# Patient Record
Sex: FEMALE | Race: WHITE | NOT HISPANIC OR LATINO | Employment: FULL TIME | URBAN - NONMETROPOLITAN AREA
[De-identification: names, ages, dates, MRNs, and addresses within clinical notes are randomized per-mention and may not be internally consistent; named-entity substitution may affect disease eponyms.]

---

## 2017-02-03 LAB
BASOPHILS # BLD AUTO: 0 X10E3/UL (ref 0–0.2)
BASOPHILS NFR BLD AUTO: 1 %
EOSINOPHIL # BLD AUTO: 0.1 X10E3/UL (ref 0–0.4)
EOSINOPHIL NFR BLD AUTO: 3 %
ERYTHROCYTE [DISTWIDTH] IN BLOOD BY AUTOMATED COUNT: 14.2 % (ref 12.3–15.4)
FERRITIN SERPL-MCNC: 18 NG/ML (ref 15–150)
HCT VFR BLD AUTO: 43.2 % (ref 34–46.6)
HGB BLD-MCNC: 14 G/DL (ref 11.1–15.9)
IMM GRANULOCYTES # BLD: 0 X10E3/UL (ref 0–0.1)
IMM GRANULOCYTES NFR BLD: 0 %
IMMATURE CELLS  115398: NORMAL
IRON SATN MFR SERPL: 21 % (ref 15–55)
IRON SERPL-MCNC: 65 UG/DL (ref 27–159)
LYMPHOCYTES # BLD AUTO: 1.8 X10E3/UL (ref 0.7–3.1)
LYMPHOCYTES NFR BLD AUTO: 41 %
MCH RBC QN AUTO: 29.2 PG (ref 26.6–33)
MCHC RBC AUTO-ENTMCNC: 32.4 G/DL (ref 31.5–35.7)
MCV RBC AUTO: 90 FL (ref 79–97)
MONOCYTES # BLD AUTO: 0.3 X10E3/UL (ref 0.1–0.9)
MONOCYTES NFR BLD AUTO: 7 %
MORPHOLOGY BLD-IMP: NORMAL
NEUTROPHILS # BLD AUTO: 2.1 X10E3/UL (ref 1.4–7)
NEUTROPHILS NFR BLD AUTO: 48 %
NRBC BLD AUTO-RTO: NORMAL %
PLATELET # BLD AUTO: 184 X10E3/UL (ref 150–379)
RBC # BLD AUTO: 4.79 X10E6/UL (ref 3.77–5.28)
TIBC SERPL-MCNC: 315 UG/DL (ref 250–450)
UIBC SERPL-MCNC: 250 UG/DL (ref 131–425)
WBC # BLD AUTO: 4.3 X10E3/UL (ref 3.4–10.8)

## 2017-03-08 ENCOUNTER — NON-PROVIDER VISIT (OUTPATIENT)
Dept: URGENT CARE | Facility: PHYSICIAN GROUP | Age: 47
End: 2017-03-08
Payer: COMMERCIAL

## 2017-03-08 ENCOUNTER — OFFICE VISIT (OUTPATIENT)
Dept: MEDICAL GROUP | Facility: PHYSICIAN GROUP | Age: 47
End: 2017-03-08
Payer: COMMERCIAL

## 2017-03-08 ENCOUNTER — APPOINTMENT (OUTPATIENT)
Dept: RADIOLOGY | Facility: IMAGING CENTER | Age: 47
End: 2017-03-08
Attending: INTERNAL MEDICINE
Payer: COMMERCIAL

## 2017-03-08 VITALS
TEMPERATURE: 97.5 F | DIASTOLIC BLOOD PRESSURE: 78 MMHG | SYSTOLIC BLOOD PRESSURE: 122 MMHG | HEIGHT: 64 IN | WEIGHT: 201 LBS | RESPIRATION RATE: 16 BRPM | OXYGEN SATURATION: 97 % | BODY MASS INDEX: 34.31 KG/M2 | HEART RATE: 76 BPM

## 2017-03-08 DIAGNOSIS — M54.2 NECK PAIN: ICD-10-CM

## 2017-03-08 DIAGNOSIS — M51.36 LUMBAR DEGENERATIVE DISC DISEASE: ICD-10-CM

## 2017-03-08 DIAGNOSIS — E66.09 NON MORBID OBESITY DUE TO EXCESS CALORIES: ICD-10-CM

## 2017-03-08 DIAGNOSIS — E63.9 NUTRITIONAL DEFICIENCY: ICD-10-CM

## 2017-03-08 DIAGNOSIS — K64.9 HEMORRHOIDS, UNSPECIFIED HEMORRHOID TYPE: ICD-10-CM

## 2017-03-08 DIAGNOSIS — D50.8 IRON DEFICIENCY ANEMIA SECONDARY TO INADEQUATE DIETARY IRON INTAKE: ICD-10-CM

## 2017-03-08 PROBLEM — M51.369 LUMBAR DEGENERATIVE DISC DISEASE: Status: ACTIVE | Noted: 2017-03-08

## 2017-03-08 PROCEDURE — 72100 X-RAY EXAM L-S SPINE 2/3 VWS: CPT | Mod: TC | Performed by: INTERNAL MEDICINE

## 2017-03-08 PROCEDURE — 72040 X-RAY EXAM NECK SPINE 2-3 VW: CPT | Mod: TC | Performed by: INTERNAL MEDICINE

## 2017-03-08 PROCEDURE — 99214 OFFICE O/P EST MOD 30 MIN: CPT | Performed by: INTERNAL MEDICINE

## 2017-03-08 RX ORDER — CYCLOBENZAPRINE HCL 10 MG
5-10 TABLET ORAL 3 TIMES DAILY PRN
Qty: 30 TAB | Refills: 0 | Status: SHIPPED | OUTPATIENT
Start: 2017-03-08 | End: 2017-09-20

## 2017-03-08 RX ORDER — HYDROCORTISONE ACETATE 25 MG/1
25 SUPPOSITORY RECTAL EVERY 12 HOURS
Qty: 30 SUPPOSITORY | Refills: 0 | Status: SHIPPED | OUTPATIENT
Start: 2017-03-08 | End: 2018-04-12

## 2017-03-08 NOTE — MR AVS SNAPSHOT
"        Jen Roblerodes   3/8/2017 7:40 AM   Office Visit   MRN: [id number]    Department:  [facility]   Dept Phone:  [phone]    Description:  Female : 1970   Provider:  Greta Ramos M.D.           Reason for Visit     Results fv labs, Rx for hemorrhoids    Pain neck and back pain,\"tearing\" in L side abd      Allergies as of 3/8/2017     Allergen Noted Reactions    Amoxicillin 2015       rashes    Penicillins 2015       rashes    Tetracycline 2015       rashes    Augmentin 10/12/2015   Rash    .      You were diagnosed with     Lumbar degenerative disc disease   [711309]       Neck pain   [030307]       Non morbid obesity due to excess calories   [8329089]       Nutritional deficiency   [790323]       Hemorrhoids, unspecified hemorrhoid type   [6964682]         Vital Signs     Blood Pressure Pulse Temperature Respirations Height Weight    122/78 mmHg 76 36.4 °C (97.5 °F) 16 1.626 m (5' 4.02\") 91.173 kg (201 lb)    Body Mass Index Oxygen Saturation Smoking Status             34.48 kg/m2 97% Never Smoker          Basic Information     Date Of Birth Sex Race Ethnicity Preferred Language    1970 Female White Non- English      Your appointments     Mar 22, 2017  8:30 AM   MA [id number] with [facility] MG 1   [facility] BREAST [facility] ([address])    [address] [place] [id number] [phone]           No deodorant, powder, perfume or lotion under the arm or breast area.            Jul 10, 2017  3:00 PM   Established Patient with Greta Ramos M.D.   [address] [address] [phone]           You will be receiving a confirmation call a few days before your appointment from our automated call confirmation system.              Problem List              ICD-10-CM Priority Class Noted - Resolved    Low back pain M54.5   2014 - Present    Obesity E66.9   2014 - Present   " Non morbid obesity due to excess calories E66.09   10/12/2015 - Present    S/P gastric bypass Z98.84   12/21/2015 - Present    Nutritional deficiency E63.9   12/21/2015 - Present    Status post hysterectomy Z90.710   6/23/2016 - Present    Slow transit constipation K59.01   6/23/2016 - Present    Lumbar degenerative disc disease M51.36   3/8/2017 - Present    Neck pain M54.2   3/8/2017 - Present      Health Maintenance        Date Due Completion Dates    IMM DTaP/Tdap/Td Vaccine (1 - Tdap) 8/22/1990 ---    PAP SMEAR 3/2/2016 3/2/2013 (Done)    Override on 3/2/2013: Done (sees GYN)    IMM INFLUENZA (1) 9/1/2016 ---    MAMMOGRAM 2/18/2017 2/18/2016, 2/9/2016, 2/9/2016, 2/9/2016            Current Immunizations     No immunizations on file.      Below and/or attached are the medications your provider expects you to take. Review all of your home medications and newly ordered medications with your provider and/or pharmacist. Follow medication instructions as directed by your provider and/or pharmacist. Please keep your medication list with you and share with your provider. Update the information when medications are discontinued, doses are changed, or new medications (including over-the-counter products) are added; and carry medication information at all times in the event of emergency situations     Allergies:  AMOXICILLIN - (reactions not documented)     PENICILLINS - (reactions not documented)     TETRACYCLINE - (reactions not documented)     AUGMENTIN - Rash               Medications  Valid as of: March 08, 2017 -  8:17 AM    Generic Name Brand Name Tablet Size Instructions for use    Calcium-Vitamin D-Vitamin K   Take 1 Tab by mouth every day.        Cephalexin (Cap) KEFLEX 500 MG Take 500 mg by mouth 4 times a day.        Clotrimazole-Betamethasone (Cream) LOTRISONE 1-0.05 % Apply  to affected area(s) 2 times a day.        Cyclobenzaprine HCl (Tab) FLEXERIL 10 MG Take 0.5-1 Tabs by mouth 3 times a day as needed for  Mild Pain or Moderate Pain.        Ergocalciferol (Cap) DRISDOL 92220 UNITS Take 50,000 Units by mouth every 7 days.        Ferrous Gluconate (Tab) Iron 240 (27 FE) MG Take 1 Tab by mouth every day.        Fluconazole   Take 150 mg by mouth.        Hydrocortisone Acetate (Suppos) ANUSOL-HC 25 MG Insert 25 mg in rectum as needed. Indications: Inflamed Hemorrhoids        Hydrocortisone Acetate (Suppos) ANUSOL-HC 25 MG Insert 1 Suppository in rectum every 12 hours.        MethylPREDNISolone (Tablet Therapy Pack) MEDROL DOSEPAK 4 MG Take 1 Tab by mouth See Admin Instructions.        Misc. Devices (Misc) Misc. Devices  Total body iron transfusion per pharmacy protocol; patient has iron deficiency anemia; fax to infusion center        Multiple Vitamins-Iron   Take 1 Tab by mouth every day.        Ondansetron (TABLET DISPERSIBLE) ZOFRAN ODT 4 MG         OxyCODONE HCl (Solution) ROXICODONE 5 MG/5ML         Polyethylene Glycol 3350 (Powder) MIRALAX  Take 17 g by mouth 2 times a day.        Probiotic Product (Cap) PROBIOTIC DAILY  Take 1 Cap by mouth every day.        .                 Medicines prescribed today were sent to:     [facility] [address] [place] [address] - [address] [facility] [address] [place]    [address] [facility] [address]    Phone: [phone] Fax: [phone]    Open 24 Hours?: No      Medication refill instructions:       If your prescription bottle indicates you have medication refills left, it is not necessary to call your provider’s office. Please contact your pharmacy and they will refill your medication.    If your prescription bottle indicates you do not have any refills left, you may request refills at any time through one of the following ways: The online [employer] system (except Urgent Care), by calling your provider’s office, or by asking your pharmacy to contact your provider’s office with a refill request. Medication refills are processed only during regular  business hours and may not be available until the next business day. Your provider may request additional information or to have a follow-up visit with you prior to refilling your medication.   *Please Note: Medication refills are assigned a new Rx number when refilled electronically. Your pharmacy may indicate that no refills were authorized even though a new prescription for the same medication is available at the pharmacy. Please request the medicine by name with the pharmacy before contacting your provider for a refill.        Your To Do List     Future Labs/Procedures Complete By Expires    DX-CERVICAL SPINE-2 OR 3 VIEWS  As directed 3/8/2018    DX-LUMBAR SPINE-2 OR 3 VIEWS  As directed 3/8/2018    Standing Orders Interval Expires    CBC WITH DIFFERENTIAL  q 3 month until 3/8/2018 3/8/2018    FERRITIN  q 3 month until 3/8/2018 3/8/2018    IRON/TOTAL IRON BIND  q 3 month until 3/8/2018 3/8/2018      Instructions    1. Have xrays of your neck.    2. Have labs checked every 3-4 months for anemia.    3. Follow up in 3-4 months.          [employer] Access Access Code: Activation code not generated  Current Aqua Access Status: Active

## 2017-03-08 NOTE — ASSESSMENT & PLAN NOTE
Patient does continue to have weight loss after her gastric bypass surgery. We discussed continuing to work on diet and exercise changes.

## 2017-03-08 NOTE — PROGRESS NOTES
"Chief Complaint   Patient presents with   • Results     fv labs, Rx for hemorrhoids   • Pain     neck and back pain,\"tearing\" in L side abd       HISTORY OF PRESENT ILLNESS: Patient is a 46 y.o. female established patient who presents today to be seen for acute and chronic issues.    Lumbar degenerative disc disease  The patient has a long history of episodic low back pain. X-rays done in 2014 which showed lumbar degenerative disc disease. Recently with exercise she has noted more back symptoms. She has had extensive weight loss after gastric bypass surgery in 2015. We discussed a reexamining her back with another x-ray and doing stretching exercises. I did offer referral to physiatry but she declines.    Neck pain  Patient also notes episodic neck pain and tightness that can lead to headaches about once a week. Symptoms seem to worsen with physical activity. We discussed that this is probably a similar process to what is in her lower back. I recommended doing an x-ray. Patient also was offered Flexeril as a muscle relaxer and told to be cautious with this medication which can be sedating.    Iron deficiency anemia secondary to inadequate dietary iron intake  Patient has long history of iron deficiency anemia that she required transfusions past. She has a history of gastric bypass surgery in 2015 and also extremely heavy menstrual periods which were the source of most of her anemia in the past. She did have a hysterectomy. This all we demonstrated that she had low iron again and the patient was transfused iron. She may require intermittent transfusions and I'm giving her standing lab orders for CBC, ferritin and iron studies to be checked every 3-4 months. Her recent stores are stable although ferritin did decrease in the past 5 months..    Obesity  Patient does continue to have weight loss after her gastric bypass surgery. We discussed continuing to work on diet and exercise changes.      Patient Active Problem " List    Diagnosis Date Noted   • Lumbar degenerative disc disease 03/08/2017   • Neck pain 03/08/2017   • Iron deficiency anemia secondary to inadequate dietary iron intake 03/08/2017   • Status post hysterectomy 06/23/2016   • Slow transit constipation 06/23/2016   • S/P gastric bypass 12/21/2015   • Nutritional deficiency 12/21/2015   • Non morbid obesity due to excess calories 10/12/2015   • Low back pain 07/02/2014   • Obesity 07/02/2014       Allergies:Amoxicillin; Penicillins; Tetracycline; and Augmentin    Current Outpatient Prescriptions Ordered in [facility]   Medication Sig Dispense Refill   • cyclobenzaprine (FLEXERIL) 10 MG Tab Take 0.5-1 Tabs by mouth 3 times a day as needed for Mild Pain or Moderate Pain. 30 Tab 0   • hydrocortisone (ANUSOL-HC) 25 MG Suppos Insert 1 Suppository in rectum every 12 hours. 30 Suppository 0   • clotrimazole-betamethasone (LOTRISONE) 1-0.05 % Cream Apply  to affected area(s) 2 times a day.     • FLUCONAZOLE PO Take 150 mg by mouth.     • polyethylene glycol 3350 (MIRALAX) Powder Take 17 g by mouth 2 times a day. 1 Bottle 3   • hydrocortisone (ANUSOL-HC) 25 MG Suppos Insert 25 mg in rectum as needed. Indications: Inflamed Hemorrhoids     • MethylPREDNISolone (MEDROL DOSEPAK) 4 MG Tablet Therapy Pack Take 1 Tab by mouth See Admin Instructions. 21 Tab 0   • Misc. Devices Misc Total body iron transfusion per pharmacy protocol; patient has iron deficiency anemia; fax to infusion center 1 Device 0   • cephALEXin (KEFLEX) 500 MG Cap Take 500 mg by mouth 4 times a day.     • oxycodone (ROXICODONE) 5 MG/5ML solution      • ondansetron (ZOFRAN ODT) 4 MG TABLET DISPERSIBLE   0   • Multiple Vitamins-Iron (MULTIVITAMIN/IRON PO) Take 1 Tab by mouth every day.     • Ferrous Gluconate (IRON) 240 (27 FE) MG Tab Take 1 Tab by mouth every day.     • Calcium-Vitamin D-Vitamin K (CALCIUM SOFT CHEWS PO) Take 1 Tab by mouth every day.     • Probiotic Product (PROBIOTIC DAILY) Cap Take 1 Cap by mouth  "every day.     • vitamin D, Ergocalciferol, (DRISDOL) 73084 UNITS Cap capsule Take 50,000 Units by mouth every 7 days.       No current [facility]-ordered facility-administered medications on file.       Past Medical History   Diagnosis Date   • Obesity    • Anesthesia      nausea   • Arthritis      deg disc disease lower back   • Gynecological disorder      fibroids, irreg periods   • Anemia    • Bowel habit changes      constipation   • Hemorrhoids        Social History   Substance Use Topics   • Smoking status: Never Smoker    • Smokeless tobacco: Never Used   • Alcohol Use: No       Family Status   Relation Status Death Age   • Mother Alive    • Father Alive      Family History   Problem Relation Age of Onset   • Diabetes Father        ROS:  Review of Systems   Constitutional: Negative for fever and malaise/fatigue.   HENT: Negative for congestion  Respiratory: Negative for cough  Cardiovascular: Negative for chest pain  Gastrointestinal: Negative for nausea, vomiting and abdominal pain.  Musculoskeletal: Positive for back pain and neck pain  All other systems reviewed and are negative except as in HPI.      Exam:  Blood pressure 122/78, pulse 76, temperature 36.4 °C (97.5 °F), resp. rate 16, height 1.626 m (5' 4.02\"), weight 91.173 kg (201 lb), SpO2 97 %.  General: Obese female in NAD  Head is grossly normal.  Neck: Intact for range of motion but muscle tension noted on exam  Pulmonary: Clear to ausculation and percussion.  Normal effort. No rales, ronchi, or wheezing.  Cardiovascular: Regular rate and rhythm without murmur. Carotid and radial pulses are intact and equal bilaterally.  Extremities: no clubbing, cyanosis, or edema.    No visits with results within 1 Month(s) from this visit.  Latest known visit with results is:    Orders Only on 02/02/2017   Component Date Value Ref Range Status   • WBC 02/02/2017 4.3  3.4 - 10.8 x10E3/uL Final   • RBC 02/02/2017 4.79  3.77 - 5.28 x10E6/uL Final   • Hemoglobin " 02/02/2017 14.0  11.1 - 15.9 g/dL Final   • Hematocrit 02/02/2017 43.2  34.0 - 46.6 % Final   • MCV 02/02/2017 90  79 - 97 fL Final   • MCH 02/02/2017 29.2  26.6 - 33.0 pg Final   • MCHC 02/02/2017 32.4  31.5 - 35.7 g/dL Final   • RDW 02/02/2017 14.2  12.3 - 15.4 % Final   • Platelet Count 02/02/2017 184  150 - 379 x10E3/uL Final   • Neutrophils-Polys 02/02/2017 48   Final   • Lymphocytes 02/02/2017 41   Final   • Monocytes 02/02/2017 7   Final   • Eosinophils 02/02/2017 3   Final   • Basophils 02/02/2017 1   Final   • Immature Cells 02/02/2017 CANCELED   Final    Result canceled by the ancillary   • Neutrophils (Absolute) 02/02/2017 2.1  1.4 - 7.0 x10E3/uL Final   • Lymphs (Absolute) 02/02/2017 1.8  0.7 - 3.1 x10E3/uL Final   • Monos (Absolute) 02/02/2017 0.3  0.1 - 0.9 x10E3/uL Final   • Eos (Absolute) 02/02/2017 0.1  0.0 - 0.4 x10E3/uL Final   • Baso (Absolute) 02/02/2017 0.0  0.0 - 0.2 x10E3/uL Final   • Immature Granulocytes 02/02/2017 0   Final   • Immature Granulocytes (abs) 02/02/2017 0.0  0.0 - 0.1 x10E3/uL Final   • Nucleated RBC 02/02/2017 CANCELED   Final    Result canceled by the ancillary   • Comments-Diff 02/02/2017 CANCELED   Final    Result canceled by the ancillary   • Total Iron Binding 02/02/2017 315  250 - 450 ug/dL Final   • Unsat Iron Binding 02/02/2017 250  131 - 425 ug/dL Final   • Iron 02/02/2017 65  27 - 159 ug/dL Final   • % Saturation 02/02/2017 21  15 - 55 % Final   • Ferritin 02/02/2017 18  15 - 150 ng/mL Final    Comment: A courtesy copy of this report has been sent to  the patient.           Assessment/Plan:  1. Lumbar degenerative disc disease  DX-LUMBAR SPINE-2 OR 3 VIEWS    cyclobenzaprine (FLEXERIL) 10 MG Tab    Uncontrolled, patient will do stretching exercises   2. Neck pain  DX-CERVICAL SPINE-2 OR 3 VIEWS    cyclobenzaprine (FLEXERIL) 10 MG Tab    Uncontrolled, recommended x-ray   3. Non morbid obesity due to excess calories  Patient identified as having weight management  issue.  Appropriate orders and counseling given.    Control, losing weight after gastric bypass   4. Nutritional deficiency  CBC WITH DIFFERENTIAL    FERRITIN    IRON/TOTAL IRON BIND    Uncontrolled, would recommend monitoring anemia   5. Hemorrhoids, unspecified hemorrhoid type  hydrocortisone (ANUSOL-HC) 25 MG Suppos   6. Iron deficiency anemia secondary to inadequate dietary iron intake       Please note that this dictation was created using voice recognition software. I have made every reasonable attempt to correct obvious errors, but I expect that there are errors of grammar and possibly content that I did not discover before finalizing the note.

## 2017-03-08 NOTE — ASSESSMENT & PLAN NOTE
Patient also notes episodic neck pain and tightness that can lead to headaches about once a week. Symptoms seem to worsen with physical activity. We discussed that this is probably a similar process to what is in her lower back. I recommended doing an x-ray. Patient also was offered Flexeril as a muscle relaxer and told to be cautious with this medication which can be sedating.

## 2017-03-08 NOTE — PATIENT INSTRUCTIONS
1. Have xrays of your neck.    2. Have labs checked every 3-4 months for anemia.    3. Follow up in 3-4 months.

## 2017-03-08 NOTE — ASSESSMENT & PLAN NOTE
Patient has long history of iron deficiency anemia that she required transfusions past. She has a history of gastric bypass surgery in 2015 and also extremely heavy menstrual periods which were the source of most of her anemia in the past. She did have a hysterectomy. This all we demonstrated that she had low iron again and the patient was transfused iron. She may require intermittent transfusions and I'm giving her standing lab orders for CBC, ferritin and iron studies to be checked every 3-4 months. Her recent stores are stable although ferritin did decrease in the past 5 months..

## 2017-03-08 NOTE — ASSESSMENT & PLAN NOTE
The patient has a long history of episodic low back pain. X-rays done in 2014 which showed lumbar degenerative disc disease. Recently with exercise she has noted more back symptoms. She has had extensive weight loss after gastric bypass surgery in 2015. We discussed a reexamining her back with another x-ray and doing stretching exercises. I did offer referral to physiatry but she declines.

## 2017-03-09 ENCOUNTER — TELEPHONE (OUTPATIENT)
Dept: MEDICAL GROUP | Facility: PHYSICIAN GROUP | Age: 47
End: 2017-03-09

## 2017-03-14 ENCOUNTER — TELEPHONE (OUTPATIENT)
Dept: MEDICAL GROUP | Facility: PHYSICIAN GROUP | Age: 47
End: 2017-03-14

## 2017-03-14 NOTE — TELEPHONE ENCOUNTER
We received a PAR for Anusol-HC. PAR was completed and sent back to us from Insurance with notification that med does not need PA. WE contacted the pharmacy and they stated that they are receiving a denial from the pharmacy stating NON FDA approved and NDC not on file. Please advise.

## 2017-03-14 NOTE — TELEPHONE ENCOUNTER
At this point the patient can go back to [facility] but her insurance will not cover it. She can try hydrocortisone cream 2.5% OTC and just insert it.

## 2017-03-22 ENCOUNTER — HOSPITAL ENCOUNTER (OUTPATIENT)
Dept: RADIOLOGY | Facility: MEDICAL CENTER | Age: 47
End: 2017-03-22
Attending: INTERNAL MEDICINE
Payer: COMMERCIAL

## 2017-03-22 DIAGNOSIS — Z12.31 VISIT FOR SCREENING MAMMOGRAM: ICD-10-CM

## 2017-03-22 PROCEDURE — G0202 SCR MAMMO BI INCL CAD: HCPCS

## 2017-03-29 ENCOUNTER — TELEPHONE (OUTPATIENT)
Dept: MEDICAL GROUP | Facility: PHYSICIAN GROUP | Age: 47
End: 2017-03-29

## 2017-03-29 NOTE — Clinical Note
April 4, 2017        Jen Mejía  [address]  [place] [address]        Dear Jen:    I have reviewed your mammogram results, they consist of the following:     TECHNIQUE/EXAM DESCRIPTION:  Bilateral digital screening mammography was performed and interpreted with CAD.     COMPARISON:   February 18, 2016, August 21, 2015 and November 13, 2014     FINDINGS:     There are scattered areas of fibroglandular density.  There is no dominant mass, suspicious calcification, or any secondary malignant sign.  Biopsy clip identified medially in the left breast. Bilateral benign-appearing calcifications are once again    noted.  Impression        1.  Breasts have scattered areas of fibroglandular density, with no radiographic evidence of malignancy.     2.  Screening mammogram in one year is recommended.        R2 - Category 2:  Benign Finding(s)       If you have any questions or concerns, please don't hesitate to call.        Sincerely,        DAVINA Matthew.    Electronically Signed

## 2017-03-29 NOTE — TELEPHONE ENCOUNTER
----- Message from Your Healthcare Team sent at 3/29/2017  5:00 AM PDT -----  Regarding: mammogram results  Jen,  I have reviewed your mammogram results, they consist of the following:    TECHNIQUE/EXAM DESCRIPTION:  Bilateral digital screening mammography was performed and interpreted with CAD.    COMPARISON:   February 18, 2016, August 21, 2015 and November 13, 2014    FINDINGS:    There are scattered areas of fibroglandular density.  There is no dominant mass, suspicious calcification, or any secondary malignant sign.  Biopsy clip identified medially in the left breast. Bilateral benign-appearing calcifications are once again   noted.  Impression   1.  Breasts have scattered areas of fibroglandular density, with no radiographic evidence of malignancy.    2.  Screening mammogram in one year is recommended.      R2 - Category 2:  Benign Finding(s)    If you have any further questions or concerns, please do not hesitate to call or send a message.    Flori Streeter, FNP–C

## 2017-03-29 NOTE — TELEPHONE ENCOUNTER
I sent the below information via my chart message a week ago--she has not read message as of yet. Please call her, or send letter with this information. Thank you.

## 2017-07-17 ENCOUNTER — OFFICE VISIT (OUTPATIENT)
Dept: URGENT CARE | Facility: PHYSICIAN GROUP | Age: 47
End: 2017-07-17
Payer: COMMERCIAL

## 2017-07-17 VITALS
HEIGHT: 64 IN | DIASTOLIC BLOOD PRESSURE: 80 MMHG | WEIGHT: 209 LBS | OXYGEN SATURATION: 92 % | RESPIRATION RATE: 18 BRPM | SYSTOLIC BLOOD PRESSURE: 130 MMHG | BODY MASS INDEX: 35.68 KG/M2 | HEART RATE: 86 BPM | TEMPERATURE: 98.4 F

## 2017-07-17 DIAGNOSIS — R51.9 NONINTRACTABLE HEADACHE, UNSPECIFIED CHRONICITY PATTERN, UNSPECIFIED HEADACHE TYPE: ICD-10-CM

## 2017-07-17 PROCEDURE — 99203 OFFICE O/P NEW LOW 30 MIN: CPT | Performed by: PHYSICIAN ASSISTANT

## 2017-07-17 RX ORDER — BUTALBITAL, ACETAMINOPHEN, CAFFEINE AND CODEINE PHOSPHATE 50; 325; 40; 30 MG/1; MG/1; MG/1; MG/1
1 CAPSULE ORAL EVERY 12 HOURS PRN
Qty: 15 CAP | Refills: 0 | Status: SHIPPED | OUTPATIENT
Start: 2017-07-17 | End: 2017-09-20

## 2017-07-17 RX ORDER — KETOROLAC TROMETHAMINE 30 MG/ML
30 INJECTION, SOLUTION INTRAMUSCULAR; INTRAVENOUS ONCE
Qty: 1 ML | Refills: 0 | OUTPATIENT
Start: 2017-07-17 | End: 2017-07-17

## 2017-07-17 NOTE — PROGRESS NOTES
Chief Complaint   Patient presents with   • Sore Throat     Headache, otalgia       HISTORY OF PRESENT ILLNESS: Patient is a 46 y.o. female who presents today for evaluation of a sore throat, ear pain, and headache for the last few days. Patient has had a sore throat and ear pain for several days. She thought it might be related to some ulcerations that she had on the left side of her mouth. She still has one ulceration on the roof of her mouth but the rest have resolved. She denies any difficulty hearing and drainage from her ears. She denies fever, chills, and chills. She complains of a headache it's by her temples, across her forehead, and at the back of her head. She has a history of migraines and states that it is in a similar location but is hurting worse than normal. She denies blurry vision and vomiting but does report some nausea. Over-the-counter medication has not been helping. She does not have migraines very often and has not had one in a few months.    Patient Active Problem List    Diagnosis Date Noted   • Lumbar degenerative disc disease 03/08/2017   • Neck pain 03/08/2017   • Iron deficiency anemia secondary to inadequate dietary iron intake 03/08/2017   • Status post hysterectomy 06/23/2016   • Slow transit constipation 06/23/2016   • S/P gastric bypass 12/21/2015   • Nutritional deficiency 12/21/2015   • Non morbid obesity due to excess calories 10/12/2015   • Low back pain 07/02/2014   • Obesity 07/02/2014       Allergies:Amoxicillin; Penicillins; Tetracycline; and Augmentin    Current Outpatient Prescriptions Ordered in [facility]   Medication Sig Dispense Refill   • butalbital-acetaminophen-caffeine-codeine (FIORICET W/CODEINE) -44-30 MG per capsule Take 1 Cap by mouth every 12 hours as needed for Headache. 15 Cap 0   • ketorolac (TORADOL) 60 MG/2ML Solution 1 mL by Intramuscular route Once for 1 dose. 1 mL 0   • cyclobenzaprine (FLEXERIL) 10 MG Tab Take 0.5-1 Tabs by mouth 3 times a day as  needed for Mild Pain or Moderate Pain. 30 Tab 0   • hydrocortisone (ANUSOL-HC) 25 MG Suppos Insert 1 Suppository in rectum every 12 hours. 30 Suppository 0   • MethylPREDNISolone (MEDROL DOSEPAK) 4 MG Tablet Therapy Pack Take 1 Tab by mouth See Admin Instructions. 21 Tab 0   • Misc. Devices Misc Total body iron transfusion per pharmacy protocol; patient has iron deficiency anemia; fax to infusion center 1 Device 0   • cephALEXin (KEFLEX) 500 MG Cap Take 500 mg by mouth 4 times a day.     • clotrimazole-betamethasone (LOTRISONE) 1-0.05 % Cream Apply  to affected area(s) 2 times a day.     • oxycodone (ROXICODONE) 5 MG/5ML solution      • ondansetron (ZOFRAN ODT) 4 MG TABLET DISPERSIBLE   0   • FLUCONAZOLE PO Take 150 mg by mouth.     • polyethylene glycol 3350 (MIRALAX) Powder Take 17 g by mouth 2 times a day. 1 Bottle 3   • Multiple Vitamins-Iron (MULTIVITAMIN/IRON PO) Take 1 Tab by mouth every day.     • Ferrous Gluconate (IRON) 240 (27 FE) MG Tab Take 1 Tab by mouth every day.     • Calcium-Vitamin D-Vitamin K (CALCIUM SOFT CHEWS PO) Take 1 Tab by mouth every day.     • Probiotic Product (PROBIOTIC DAILY) Cap Take 1 Cap by mouth every day.     • hydrocortisone (ANUSOL-HC) 25 MG Suppos Insert 25 mg in rectum as needed. Indications: Inflamed Hemorrhoids     • vitamin D, Ergocalciferol, (DRISDOL) 25017 UNITS Cap capsule Take 50,000 Units by mouth every 7 days.       No current [facility]-ordered facility-administered medications on file.       Past Medical History   Diagnosis Date   • Obesity    • Anesthesia      nausea   • Arthritis      deg disc disease lower back   • Gynecological disorder      fibroids, irreg periods   • Anemia    • Bowel habit changes      constipation   • Hemorrhoids        Social History   Substance Use Topics   • Smoking status: Never Smoker    • Smokeless tobacco: Never Used   • Alcohol Use: No       Family Status   Relation Status Death Age   • Mother Alive    • Father Alive      Family History  "  Problem Relation Age of Onset   • Diabetes Father        ROS:   Review of Systems   Constitutional: Negative for fever, chills, weight loss and malaise/fatigue.   HENT: Negative for nosebleeds, congestion, sore throat and neck pain.    Eyes: Negative for blurred vision.   Respiratory: Negative for cough, sputum production, shortness of breath and wheezing.    Cardiovascular: Negative for chest pain, palpitations, orthopnea and leg swelling.   Gastrointestinal: Negative for heartburn, nausea, vomiting and abdominal pain.   Genitourinary: Negative for dysuria, urgency and frequency.       Exam:  Blood pressure 130/80, pulse 86, temperature 36.9 °C (98.4 °F), resp. rate 18, height 1.626 m (5' 4\"), weight 94.802 kg (209 lb), SpO2 92 %.  General: Normal appearing. No distress.  HEENT: Conjunctiva clear, lids without ptosis, ears normal shape and contour, canals are clear bilaterally, tympanic membranes are benign with fluid posteriorly bilaterally, nasal mucosa edematous bilaterally, oropharynx is without erythema, edema or exudates. PERRL, EOMI. 1 small ulceration noted on the left side of the hard palate without surrounding edema/drainage.  Pulmonary: Clear to ausculation and percussion.  Normal effort. No rales, ronchi, or wheezing.   Cardiovascular: Regular rate and rhythm without murmur.   Neurologic: Grossly nonfocal.  Lymph: No cervical lymphadenopathy noted.  Skin: No obvious lesions noted in the area of pain.  Psych: Normal mood. Alert and oriented x3. Judgment and insight is normal.    Toradol 60 mg IM    Assessment/Plan:  DDX including but not limited to allergies/migraine/viral illness. Discussed appropriate over-the-counter symptomatic medication, and when to return to clinic. Take all medication as directed. Follow up for worsening or persistent symptoms.  1. Nonintractable headache, unspecified chronicity pattern, unspecified headache type  butalbital-acetaminophen-caffeine-codeine (FIORICET W/CODEINE) " -35-30 MG per capsule    ketorolac (TORADOL) 60 MG/2ML Solution

## 2017-07-17 NOTE — MR AVS SNAPSHOT
"Mohsenneli Lincolngundes   2017 2:25 PM   Office Visit   MRN: [id number]    Department:  [facility]   Dept Phone:  [phone]    Description:  Female : 1970   Provider:  Carol Roach PA-C           Reason for Visit     Sore Throat Headache, otalgia      Allergies as of 2017     Allergen Noted Reactions    Amoxicillin 2015       rashes    Penicillins 2015       rashes    Tetracycline 2015       rashes    Augmentin 10/12/2015   Rash    .      You were diagnosed with     Nonintractable headache, unspecified chronicity pattern, unspecified headache type   [[id number]]         Vital Signs     Blood Pressure Pulse Temperature Respirations Height Weight    130/80 mmHg 86 36.9 °C (98.4 °F) 18 1.626 m (5' 4\") 94.802 kg (209 lb)    Body Mass Index Oxygen Saturation Smoking Status             35.86 kg/m2 92% Never Smoker          Basic Information     Date Of Birth Sex Race Ethnicity Preferred Language    1970 Female White Non- English      Your appointments     Sep 20, 2017  8:00 AM   NEW TO YOU with Lemuel Wolfe M.D.   [facility]    [address] [place] [id number] [phone]              Problem List              ICD-10-CM Priority Class Noted - Resolved    Low back pain M54.5   2014 - Present    Obesity E66.9   2014 - Present    Non morbid obesity due to excess calories E66.09   10/12/2015 - Present    S/P gastric bypass Z98.84   2015 - Present    Nutritional deficiency E63.9   2015 - Present    Status post hysterectomy Z90.710   2016 - Present    Slow transit constipation K59.01   2016 - Present    Lumbar degenerative disc disease M51.36   3/8/2017 - Present    Neck pain M54.2   3/8/2017 - Present    Iron deficiency anemia secondary to inadequate dietary iron intake D50.8   3/8/2017 - Present      Health Maintenance        Date Due Completion Dates    IMM DTaP/Tdap/Td Vaccine (1 - " Tdap) 8/22/1990 ---    PAP SMEAR 3/2/2016 3/2/2013 (Done)    Override on 3/2/2013: Done (sees GYN)    IMM INFLUENZA (1) 9/1/2017 ---    MAMMOGRAM 3/22/2018 3/22/2017, 2/18/2016            Current Immunizations     No immunizations on file.      Below and/or attached are the medications your provider expects you to take. Review all of your home medications and newly ordered medications with your provider and/or pharmacist. Follow medication instructions as directed by your provider and/or pharmacist. Please keep your medication list with you and share with your provider. Update the information when medications are discontinued, doses are changed, or new medications (including over-the-counter products) are added; and carry medication information at all times in the event of emergency situations     Allergies:  AMOXICILLIN - (reactions not documented)     PENICILLINS - (reactions not documented)     TETRACYCLINE - (reactions not documented)     AUGMENTIN - Rash               Medications  Valid as of: July 17, 2017 -  3:55 PM    Generic Name Brand Name Tablet Size Instructions for use    Butalbital-APAP-Caff-Cod (Cap) FIORICET W/CODEINE -75-30 MG Take 1 Cap by mouth every 12 hours as needed for Headache.        Calcium-Vitamin D-Vitamin K   Take 1 Tab by mouth every day.        Cephalexin (Cap) KEFLEX 500 MG Take 500 mg by mouth 4 times a day.        Clotrimazole-Betamethasone (Cream) LOTRISONE 1-0.05 % Apply  to affected area(s) 2 times a day.        Cyclobenzaprine HCl (Tab) FLEXERIL 10 MG Take 0.5-1 Tabs by mouth 3 times a day as needed for Mild Pain or Moderate Pain.        Ergocalciferol (Cap) DRISDOL 09213 UNITS Take 50,000 Units by mouth every 7 days.        Ferrous Gluconate (Tab) Iron 240 (27 FE) MG Take 1 Tab by mouth every day.        Fluconazole   Take 150 mg by mouth.        Hydrocortisone Acetate (Suppos) ANUSOL-HC 25 MG Insert 25 mg in rectum as needed. Indications: Inflamed Hemorrhoids         Hydrocortisone Acetate (Suppos) ANUSOL-HC 25 MG Insert 1 Suppository in rectum every 12 hours.        Ketorolac Tromethamine (Solution) TORADOL 60 MG/2ML 1 mL by Intramuscular route Once for 1 dose.        MethylPREDNISolone (Tablet Therapy Pack) MEDROL DOSEPAK 4 MG Take 1 Tab by mouth See Admin Instructions.        Misc. Devices (Misc) Misc. Devices  Total body iron transfusion per pharmacy protocol; patient has iron deficiency anemia; fax to infusion center        Multiple Vitamins-Iron   Take 1 Tab by mouth every day.        Ondansetron (TABLET DISPERSIBLE) ZOFRAN ODT 4 MG         OxyCODONE HCl (Solution) ROXICODONE 5 MG/5ML         Polyethylene Glycol 3350 (Powder) MIRALAX  Take 17 g by mouth 2 times a day.        Probiotic Product (Cap) PROBIOTIC DAILY  Take 1 Cap by mouth every day.        .                 Medicines prescribed today were sent to:     "[facility]" [facility] [address] [place], [place] - [address] [facility] [address]    [address] [facility] [address]    Phone: [phone] Fax: [phone]    Open 24 Hours?: No      Medication refill instructions:       If your prescription bottle indicates you have medication refills left, it is not necessary to call your provider’s office. Please contact your pharmacy and they will refill your medication.    If your prescription bottle indicates you do not have any refills left, you may request refills at any time through one of the following ways: The online pickrset system (except Urgent Care), by calling your provider’s office, or by asking your pharmacy to contact your provider’s office with a refill request. Medication refills are processed only during regular business hours and may not be available until the next business day. Your provider may request additional information or to have a follow-up visit with you prior to refilling your medication.   *Please Note: Medication refills are assigned a new Rx number when refilled  electronically. Your pharmacy may indicate that no refills were authorized even though a new prescription for the same medication is available at the pharmacy. Please request the medicine by name with the pharmacy before contacting your provider for a refill.           [employer] Access Code: Activation code not generated  Current [employer] Status: Active

## 2017-07-27 LAB
BASOPHILS # BLD AUTO: 0 X10E3/UL (ref 0–0.2)
BASOPHILS NFR BLD AUTO: 0 %
EOSINOPHIL # BLD AUTO: 0.1 X10E3/UL (ref 0–0.4)
EOSINOPHIL NFR BLD AUTO: 2 %
ERYTHROCYTE [DISTWIDTH] IN BLOOD BY AUTOMATED COUNT: 14.1 % (ref 12.3–15.4)
FERRITIN SERPL-MCNC: 15 NG/ML (ref 15–150)
HCT VFR BLD AUTO: 44.2 % (ref 34–46.6)
HGB BLD-MCNC: 14.6 G/DL (ref 11.1–15.9)
IMM GRANULOCYTES # BLD: 0 X10E3/UL (ref 0–0.1)
IMM GRANULOCYTES NFR BLD: 0 %
IMMATURE CELLS  115398: NORMAL
IRON SATN MFR SERPL: 24 % (ref 15–55)
IRON SERPL-MCNC: 78 UG/DL (ref 27–159)
LYMPHOCYTES # BLD AUTO: 1.9 X10E3/UL (ref 0.7–3.1)
LYMPHOCYTES NFR BLD AUTO: 37 %
MCH RBC QN AUTO: 29.3 PG (ref 26.6–33)
MCHC RBC AUTO-ENTMCNC: 33 G/DL (ref 31.5–35.7)
MCV RBC AUTO: 89 FL (ref 79–97)
MONOCYTES # BLD AUTO: 0.3 X10E3/UL (ref 0.1–0.9)
MONOCYTES NFR BLD AUTO: 7 %
MORPHOLOGY BLD-IMP: NORMAL
NEUTROPHILS # BLD AUTO: 2.7 X10E3/UL (ref 1.4–7)
NEUTROPHILS NFR BLD AUTO: 54 %
NRBC BLD AUTO-RTO: NORMAL %
PLATELET # BLD AUTO: 167 X10E3/UL (ref 150–379)
RBC # BLD AUTO: 4.99 X10E6/UL (ref 3.77–5.28)
TIBC SERPL-MCNC: 331 UG/DL (ref 250–450)
UIBC SERPL-MCNC: 253 UG/DL (ref 131–425)
WBC # BLD AUTO: 5 X10E3/UL (ref 3.4–10.8)

## 2017-09-20 ENCOUNTER — OCCUPATIONAL MEDICINE (OUTPATIENT)
Dept: URGENT CARE | Facility: PHYSICIAN GROUP | Age: 47
End: 2017-09-20
Payer: COMMERCIAL

## 2017-09-20 ENCOUNTER — APPOINTMENT (OUTPATIENT)
Dept: RADIOLOGY | Facility: IMAGING CENTER | Age: 47
End: 2017-09-20
Attending: FAMILY MEDICINE
Payer: COMMERCIAL

## 2017-09-20 ENCOUNTER — OFFICE VISIT (OUTPATIENT)
Dept: MEDICAL GROUP | Facility: PHYSICIAN GROUP | Age: 47
End: 2017-09-20
Payer: COMMERCIAL

## 2017-09-20 VITALS
DIASTOLIC BLOOD PRESSURE: 70 MMHG | TEMPERATURE: 98 F | WEIGHT: 212 LBS | RESPIRATION RATE: 14 BRPM | HEART RATE: 68 BPM | SYSTOLIC BLOOD PRESSURE: 118 MMHG | HEIGHT: 65 IN | OXYGEN SATURATION: 100 % | BODY MASS INDEX: 35.32 KG/M2

## 2017-09-20 VITALS
WEIGHT: 212 LBS | RESPIRATION RATE: 16 BRPM | HEART RATE: 74 BPM | OXYGEN SATURATION: 98 % | DIASTOLIC BLOOD PRESSURE: 72 MMHG | TEMPERATURE: 98.2 F | BODY MASS INDEX: 35.32 KG/M2 | SYSTOLIC BLOOD PRESSURE: 126 MMHG | HEIGHT: 65 IN

## 2017-09-20 DIAGNOSIS — S49.91XA RIGHT SHOULDER INJURY, INITIAL ENCOUNTER: ICD-10-CM

## 2017-09-20 DIAGNOSIS — G43.009 MIGRAINE WITHOUT AURA AND WITHOUT STATUS MIGRAINOSUS, NOT INTRACTABLE: ICD-10-CM

## 2017-09-20 DIAGNOSIS — Z98.84 S/P GASTRIC BYPASS: ICD-10-CM

## 2017-09-20 DIAGNOSIS — D50.8 IRON DEFICIENCY ANEMIA SECONDARY TO INADEQUATE DIETARY IRON INTAKE: ICD-10-CM

## 2017-09-20 DIAGNOSIS — S40.011A CONTUSION OF RIGHT SHOULDER, INITIAL ENCOUNTER: ICD-10-CM

## 2017-09-20 PROCEDURE — 73030 X-RAY EXAM OF SHOULDER: CPT | Mod: TC,RT | Performed by: FAMILY MEDICINE

## 2017-09-20 PROCEDURE — 99214 OFFICE O/P EST MOD 30 MIN: CPT | Performed by: FAMILY MEDICINE

## 2017-09-20 ASSESSMENT — PAIN SCALES - GENERAL: PAINLEVEL: 8=MODERATE-SEVERE PAIN

## 2017-09-20 NOTE — PROGRESS NOTES
Subjective:   Jen Mejía is a 47 y.o. female here today for evaluation and management of:     Iron deficiency anemia secondary to inadequate dietary iron intake  She has chronic iron deficiency anemia. She had a hysterectomy done for heavy periods due to fibroids, she had an iron infusion about a year ago and recent labs show normal H/H and iron though low normal ferritin. She has no history of blood transfusions.   Will monitor with labs every 3-4 months.       S/P gastric bypass  She has gastric bypass surgery done in 2015 by Dr. Ganser   She is taking a multivitamin supplement containing iron. She has chronic iron deficiency anemia and had a hysterectomy to treat heavy periods due to fibroids. She needed an iron infusion last year, continues to have decreasing ferritin though normal H/H and iron levels.   Will recheck labs.       Migraine without aura and without status migrainosus, not intractable  She has chronic migraines for as long as she can remember about 20 years. She has nausea and light sensitivity with them.   She was given fioricet in the urgent care and an injection which she said worked very well. She does not need a refill on fioricet.   She has taken two since she was given the rx 4 months ago.        She notices a small bump on the back of her neck on the right where she had a small boil. The boil has resolved and now the skin is healing    She also has a small white tiny nodule on the left lower lid close to the midline. It has been present for 4-5 months. She has tried to squeeze it out but it has not resolved. Non tender, no discharge.     She fell at work slipping on water and hurt her right shoulder on Friday 9/15/17 and has persistent sharp pain in right shoulder with some positions and some painful healing bruises on right arm near elbow.   She did not pass out, hit her head against the wall. She put her hand out to break her fall.     Current medicines (including changes  "today)  Current Outpatient Prescriptions   Medication Sig Dispense Refill   • butalbital-acetaminophen-caffeine-codeine (FIORICET W/CODEINE) -85-30 MG per capsule Take 1 Cap by mouth every 12 hours as needed for Headache. 15 Cap 0   • Multiple Vitamins-Iron (MULTIVITAMIN/IRON PO) Take 1 Tab by mouth every day.     • Ferrous Gluconate (IRON) 240 (27 FE) MG Tab Take 1 Tab by mouth every day.     • Calcium-Vitamin D-Vitamin K (CALCIUM SOFT CHEWS PO) Take 1 Tab by mouth every day.     • Probiotic Product (PROBIOTIC DAILY) Cap Take 1 Cap by mouth every day.     • vitamin D, Ergocalciferol, (DRISDOL) 55021 UNITS Cap capsule Take 50,000 Units by mouth every 7 days.     • cyclobenzaprine (FLEXERIL) 10 MG Tab Take 0.5-1 Tabs by mouth 3 times a day as needed for Mild Pain or Moderate Pain. 30 Tab 0   • hydrocortisone (ANUSOL-HC) 25 MG Suppos Insert 1 Suppository in rectum every 12 hours. 30 Suppository 0   • Misc. Devices Misc Total body iron transfusion per pharmacy protocol; patient has iron deficiency anemia; fax to infusion center 1 Device 0   • clotrimazole-betamethasone (LOTRISONE) 1-0.05 % Cream Apply  to affected area(s) 2 times a day.     • FLUCONAZOLE PO Take 150 mg by mouth.     • polyethylene glycol 3350 (MIRALAX) Powder Take 17 g by mouth 2 times a day. 1 Bottle 3     No current facility-administered medications for this visit.      She  has a past medical history of Anemia; Anesthesia; Arthritis; Bowel habit changes; Gynecological disorder; Hemorrhoids; and Obesity.    ROS  No chest pain, no shortness of breath, no abdominal pain       Objective:     Blood pressure 126/72, pulse 74, temperature 36.8 °C (98.2 °F), resp. rate 16, height 1.651 m (5' 5\"), weight 96.2 kg (212 lb), SpO2 98 %. Body mass index is 35.28 kg/m².   Physical Exam:  Constitutional: Alert, no distress.  Skin: Warm, dry, good turgor, no rashes in visible areas. Ecchymoses on right arm 3 inches distal to elbow. TTP over top of right " shoulder.   Eye: Equal, round and reactive, conjunctiva clear, lids normal.  ENMT: Lips without lesions, good dentition, oropharynx clear.  Neck: Trachea midline, no masses, no thyromegaly. No cervical or supraclavicular lymphadenopathy  Respiratory: Unlabored respiratory effort, lungs clear to auscultation, no wheezes, no ronchi.  Cardiovascular: Normal S1, S2, no murmur, no edema.  Abdomen: Soft, non-tender, no masses, no hepatosplenomegaly.  Psych: Alert and oriented x3, normal affect and mood.  MSK : no swelling or ttp over right shoulder. She has restricted ROM due to sharp pain. Is able to lift her arm straight up but with some pain. More pain when arm out to side and lifted up.   Pain reported inside her shoulder joint. Not on the front or side or back of it.         Assessment and Plan:   The following treatment plan was discussed    1. Iron deficiency anemia secondary to inadequate dietary iron intake  Will recheck lab. Continue with iron supplement  If ferritin drops may need repeat iron infusion.   - CBC WITH DIFFERENTIAL; Future  - FERRITIN; Future  - IRON/TOTAL IRON BIND; Future  - VITAMIN B12; Future    2. S/P gastric bypass  Stable.   Continue with multivitamin and iron supplement  H/o severe anemia. Continue to monitor labs.     3. Migraine without aura and without status migrainosus, not intractable  Well controlled with very rare use of fioricet. Two tablets in 4 months.   Advised her on addictive nature and to monitor closely.       Followup: No Follow-up on file.

## 2017-09-20 NOTE — LETTER
"EMPLOYEE’S CLAIM FOR COMPENSATION/ REPORT OF INITIAL TREATMENT  FORM C-4    EMPLOYEE’S CLAIM - PROVIDE ALL INFORMATION REQUESTED   First Name  Jen Last Name  Kym Birthdate                    1970                Sex  female Claim Number   Home Address  [address] Age  47 y.o. 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 [employer]    Employer's Name    [employer] Telephone   [phone]   Employer Address   [address] [place] [address]   [address]   Date of Injury  9/15/2017               Hour of Injury  3:00 PM Date Employer Notified  9/15/2017 Last Day of Work after Injury or Occupational Disease  9/20/2017 Supervisor to Whom Injury Reported  JOSE MIGUEL DAMON   Address or Location of Accident (if applicable)  [[address] [id number]]   What were you doing at the time of accident? (if applicable)  WALKING OUT OF BREAKROOM    How did this injury or occupational disease occur? (Be specific an answer in detail. Use additional sheet if necessary)  I WAS WALKING OUT OF BREAKROOM AND SLIPPED ON WATER, I FELL TO THE GROUND   If you believe that you have an occupational disease, when did you first have knowledge of the disability and it relationship to your employment?   Witnesses to the Accident  KAMILLE LEACH HEARD THE FALL FROM THE BREAKROOM      Nature of Injury or Occupational Disease  Workers' Compensation  Part(s) of Body Injured or Affected  Hip (R), Elbow (R), Wrist (R) and Hand (R)    I certify that the above is true and correct to the best of my knowledge and that I have provided " this information in order to obtain the benefits of [place]  I hereby authorize any physician, chiropractor, surgeon, practitioner, or other person, any hospital, including [facility] or [facility], any medical service organization, any insurance company, or other institution or organization to release to each other, any medical or other information, including benefits paid or payable, pertinent to this injury or disease, except information relative to diagnosis, treatment and/or counseling for AIDS, psychological conditions, alcohol or controlled substances, for which I must give specific authorization.  A Photostat of this authorization shall be as valid as the original.     Date  9/20/2017   [facility]   Employee’s Signature   THIS REPORT MUST BE COMPLETED AND MAILED WITHIN 3 WORKING DAYS OF TREATMENT   [facility]  Name of Facility  [facility]   Date  9/20/2017 Diagnosis  (S49.91XA) Right shoulder injury, initial encounter  (S40.011A) Contusion of right shoulder, initial encounter Is there evidence the injured employee was under the influence of alcohol and/or another controlled substance at the time of accident?   Hour  6:24 PM Description of Injury or Disease  Diagnoses of Right shoulder injury, initial encounter and Contusion of right shoulder, initial encounter were pertinent to this visit. No   Treatment  May apply ice intermittently to right shoulder and may take over-the-counter Acetaminophen (Tylenol) as needed for pain.  Have you advised the patient to remain off work five days or more? No   X-Ray Findings  Negative  Comments:Right shoulder x-rays: No acute osseous abnormality.   If Yes   From Date  To Date      From information given by the employee, together with medical evidence, can you directly connect this injury or occupational  "disease as job incurred?  Yes If No Full Duty  Yes Modified Duty      Is additional medical care by a physician indicated?  Yes  Comments:Return on 9/28/17 or sooner if needed. If Modified Duty, Specify any Limitations / Restrictions      Do you know of any previous injury or disease contributing to this condition or occupational disease?                            No   Date  9/20/2017 Print Doctor’s Name Elijah Ramirez M.D. I certify the employer’s copy of  this form was mailed on:   Address  [address] Insurer’s Use Only     [facility]  [address]    Provider’s Tax ID Number  [id number]  Telephone  Dept: [phone]        e-SignCHENELIJAH M.D.   e-Signature: Dr. Castillo Burns, Medical Director Degree  MD        ORIGINAL-TREATING PHYSICIAN OR CHIROPRACTOR    PAGE 2-INSURER/TPA    PAGE 3-EMPLOYER    PAGE 4-EMPLOYEE             Form C-4 (rev10/07)              BRIEF DESCRIPTION OF RIGHTS AND BENEFITS  (Pursuant to [place]RS 616C.050)    Notice of Injury or Occupational Disease (Incident Report Form C-1): If an injury or occupational disease (OD) arises out of and in the  course of employment, you must provide written notice to your employer as soon as practicable, but no later than 7 days after the accident or  OD. Your employer shall maintain a sufficient supply of the required forms.    Claim for Compensation (Form C-4): If medical treatment is sought, the form C-4 is available at the place of initial treatment. A completed  \"Claim for Compensation\" (Form C-4) must be filed within 90 days after an accident or OD. The treating physician or chiropractor must,  within 3 working days after treatment, complete and mail to the employer, the employer's insurer and third-party , the Claim for  Compensation.    Medical Treatment: If you require medical treatment for your on-the-job injury or OD, you may be required to select a physician or  chiropractor from a list provided by " your workers’ compensation insurer, if it has contracted with an Organization for Managed Care (MCO) or  Preferred Provider Organization (PPO) or providers of health care. If your employer has not entered into a contract with an MCO or PPO, you  may select a physician or chiropractor from the Panel of Physicians and Chiropractors. Any medical costs related to your industrial injury or  OD will be paid by your insurer.    Temporary Total Disability (TTD): If your doctor has certified that you are unable to work for a period of at least 5 consecutive days, or 5  cumulative days in a 20-day period, or places restrictions on you that your employer does not accommodate, you may be entitled to TTD  compensation.    Temporary Partial Disability (TPD): If the wage you receive upon reemployment is less than the compensation for TTD to which you are  entitled, the insurer may be required to pay you TPD compensation to make up the difference. TPD can only be paid for a maximum of 24  months.    Permanent Partial Disability (PPD): When your medical condition is stable and there is an indication of a PPD as a result of your injury or  OD, within 30 days, your insurer must arrange for an evaluation by a rating physician or chiropractor to determine the degree of your PPD. The  amount of your PPD award depends on the date of injury, the results of the PPD evaluation and your age and wage.    Permanent Total Disability (PTD): If you are medically certified by a treating physician or chiropractor as permanently and totally disabled  and have been granted a PTD status by your insurer, you are entitled to receive monthly benefits not to exceed 66 2/3% of your average  monthly wage. The amount of your PTD payments is subject to reduction if you previously received a PPD award.    Vocational Rehabilitation Services: You may be eligible for vocational rehabilitation services if you are unable to return to the job due to a  permanent  physical impairment or permanent restrictions as a result of your injury or occupational disease.    Transportation and Per Arjun Reimbursement: You may be eligible for travel expenses and per arjun associated with medical treatment.    Reopening: You may be able to reopen your claim if your condition worsens after claim closure.    Appeal Process: If you disagree with a written determination issued by the insurer or the insurer does not respond to your request, you may  appeal to the Department of Administration, , by following the instructions contained in your determination letter. You must  appeal the determination within 70 days from the date of the determination letter at [address], [address], [place], [place]  [address], or [address], [address], [place] [address]. If you disagree with the  decision, you may appeal to the  Department of Administration, . You must file your appeal within 30 days from the date of the  decision  letter at [address], [address], [place], [place] [address], or [address] [facility] [facility] [address], [place] [address]. If you  disagree with a decision of an , you may file a petition for judicial review with the District Court. You must do so within 30  days of the Appeal Officer’s decision. You may be represented by an  at your own expense or you may contact the [facility] for possible  representation.    [place]  for Injured Workers (NAIW): If you disagree with a  decision, you may request that NAIW represent you  without charge at an  Hearing. For information regarding denial of benefits, you may contact the [facility] at: [address], [address], [place] [address], [phone], or [address], [address] [address], [phone]    To File a Complaint with the Division: If you wish to file a  complaint with the  of the Division of Industrial Relations (DIR),  please contact the Workers’ Compensation Section, [address], [address], [place], [place] [address], telephone [phone]) [phone], or  [address], [address], [place], [place] [address], telephone [phone].    For assistance with Workers’ Compensation Issues: you may contact the Office of the Governor Consumer Health Assistance, [address] [place] [address], [address], [place], [place] [address], Toll Free 1-763.867.1310, Web site: [email or web address]., E-mail  [email or web address].                                                                                                                                                                                                                                   __________________________________________________________________                                                                   __9/20/2017___                Employee Name / Signature                                                                                                                                                       Date                                                                                                                                                                                                     D-2 (rev. 10/07)

## 2017-09-20 NOTE — LETTER
[facility] [address] [place] [address]  Phone:  [phone] - Fax:  [phone]   [facility] Progress Report and Disability Certification  Date of Service: 9/20/2017   No Show:  No  Date / Time of Next Visit:     Claim Information   Patient Name: Jen Mejía  Claim Number:     Employer:   [employer] Date of Injury: 9/15/2017     Insurer / TPA: ID / SSN:     Occupation: [employer]  Diagnosis: Diagnoses of Right shoulder injury, initial encounter and Contusion of right shoulder, initial encounter were pertinent to this visit.    Medical Information   Related to Industrial Injury? Yes    Subjective Complaints:  DOI: 9/15/17. Works for [employer] as . Was walking out of breakroom and slipped and fell. Landed on right side. She feels everything is healing up except right shoulder. Right shoulder pain is 8/10 severity when she moves it. Pain is better with rest. No meds taken for this. No previous problems with right shoulder. No 2nd job or other outside activity to have contributed to current symptoms. She has been told not to take anti-inflammatories due to history of Gastric Bypass. Can take Tylenol. Feels she can still do her regular work duties.   Objective Findings: Right shoulder: able to mostly do full active range of motion but with discomfort in right shoulder. Positive impingement sign right shoulder. Normal range of motion throughout right upper extremity otherwise. Mild bruising around right elbow.   Pre-Existing Condition(s): None.   Assessment:   Initial Visit    Status: Additional Care Required  Comments:Return on 9/28/17 or sooner if needed.  Permanent Disability:No    Plan: Medication  Comments:May take over-the-counter Acetaminophen (Tylenol) as needed for pain.    Diagnostics: X-ray  Comments:Right shoulder x-rays: No acute osseous abnormality.    Comments:  May apply ice intermittently to right  shoulder as needed for pain.    Disability Information   Status: Released to Full Duty    From:     Through:   Restrictions are:     Physical Restrictions   Sitting:    Standing:    Stooping:    Bending:      Squatting:    Walking:    Climbing:    Pushing:      Pulling:    Other:    Reaching Above Shoulder (L):   Reaching Above Shoulder (R):       Reaching Below Shoulder (L):    Reaching Below Shoulder (R):      Not to exceed Weight Limits   Carrying(hrs):   Weight Limit(lb):   Lifting(hrs):   Weight  Limit(lb):     Comments:      Repetitive Actions   Hands: i.e. Fine Manipulations from Grasping:     Feet: i.e. Operating Foot Controls:     Driving / Operate Machinery:     Physician Name: Elijah Ramirez M.D. Physician Signature: ELIJAH Mcgrath M.D. e-Signature: Dr. Castillo Burns, Medical Director   Clinic Name / Location: [address] [address] Clinic Phone Number: Dept: [phone]   Appointment Time: 5:40 Pm Visit Start Time: 6:24 PM   Check-In Time:  5:49 Pm Visit Discharge Time:     Original-Treating Physician or Chiropractor    Page 2-Insurer/TPA    Page 3-Employer    Page 4-Employee

## 2017-09-20 NOTE — ASSESSMENT & PLAN NOTE
She has gastric bypass surgery done in 2015 by Dr. Ganser   She is taking a multivitamin supplement containing iron. She has chronic iron deficiency anemia and had a hysterectomy to treat heavy periods due to fibroids. She needed an iron infusion last year, continues to have decreasing ferritin though normal H/H and iron levels.   Will recheck labs.

## 2017-09-20 NOTE — ASSESSMENT & PLAN NOTE
She has chronic iron deficiency anemia. She had a hysterectomy done for heavy periods due to fibroids, she had an iron infusion about a year ago and recent labs show normal H/H and iron though low normal ferritin. She has no history of blood transfusions.   Will monitor with labs every 3-4 months.

## 2017-09-20 NOTE — ASSESSMENT & PLAN NOTE
She has chronic migraines for as long as she can remember about 20 years. She has nausea and light sensitivity with them.   She was given fioricet in the urgent care and an injection which she said worked very well. She does not need a refill on fioricet.   She has taken two since she was given the rx 4 months ago.

## 2017-09-21 NOTE — PROGRESS NOTES
Chief Complaint:    Chief Complaint   Patient presents with   • Shoulder Injury     right fell at work        History of Present Illness:    DOI: 9/15/17. Works for [employer] as . Was walking out of breakroom and slipped and fell. Landed on right side. She feels everything is healing up except right shoulder. Right shoulder pain is 8/10 severity when she moves it. Pain is better with rest. No meds taken for this. No previous problems with right shoulder. No 2nd job or other outside activity to have contributed to current symptoms. She has been told not to take anti-inflammatories due to history of Gastric Bypass. Can take Tylenol. Feels she can still do her regular work duties.      Review of Systems:    Constitutional: Negative for fever, chills, and diaphoresis.   Eyes: Negative for change in vision, photophobia, pain, redness, and discharge.  ENT: Negative for ear pain, ear discharge, hearing loss, tinnitus, nasal congestion, nosebleeds, and sore throat.    Respiratory: Negative for cough, hemoptysis, sputum production, shortness of breath, wheezing, and stridor.    Cardiovascular: Negative for chest pain, palpitations, orthopnea, claudication, leg swelling, and PND.   Gastrointestinal: Negative for abdominal pain, nausea, vomiting, diarrhea, constipation, blood in stool, and melena.   Genitourinary: Negative for dysuria, urinary urgency, urinary frequency, hematuria, and flank pain.   Musculoskeletal: See HPI.   Skin: Negative for rash and itching.   Neurological: Negative for dizziness, tingling, tremors, sensory change, speech change, focal weakness, seizures, loss of consciousness, and headaches.   Endo: Negative for polydipsia.   Heme: Does not bruise/bleed easily.   Psychiatric/Behavioral: Negative for depression, suicidal ideas, hallucinations, memory loss and substance abuse. The patient is not nervous/anxious and does not have insomnia.      Past Medical History:    Past Medical  History:   Diagnosis Date   • Anemia    • Anesthesia     nausea   • Arthritis     deg disc disease lower back   • Bowel habit changes     constipation   • Gynecological disorder     fibroids, irreg periods   • Hemorrhoids    • Obesity        Past Surgical History:    Past Surgical History:   Procedure Laterality Date   • VAGINAL HYSTERECTOMY SCOPE TOTAL  6/9/2016    Procedure: VAGINAL HYSTERECTOMY SCOPE TOTAL, BILATERAL SALPINGECTOMY;  Surgeon: Hue Jeong M.D.;  Location: SURGERY SAME DAY [facility];  Service:    • CYSTOSCOPY N/A 6/9/2016    Procedure: CYSTOSCOPY;  Surgeon: Hue Jeong M.D.;  Location: SURGERY SAME DAY [facility];  Service:    • GASTRIC BYPASS LAPAROSCOPIC  10/12/2015    Procedure: GASTRIC BYPASS LAPAROSCOPIC SUSHIL EN Y;  Surgeon: John H Ganser, M.D.;  Location: SURGERY [facility];  Service:    • ACL RECONSTRUCTION     • CHOLECYSTECTOMY     • KNEE ARTHROSCOPY      multiple   • TONSILLECTOMY     • US-NEEDLE CORE BX-BREAST PANEL         Social History:    Social History     Social History   • Marital status:      Spouse name: N/A   • Number of children: N/A   • Years of education: N/A     Occupational History   • Not on file.     Social History Main Topics   • Smoking status: Never Smoker   • Smokeless tobacco: Never Used   • Alcohol use No   • Drug use: No   • Sexual activity: Yes     Partners: Male     Other Topics Concern   • Not on file     Social History Narrative   • No narrative on file       Family History:    Family History   Problem Relation Age of Onset   • Diabetes Father        Medications:    Current Outpatient Prescriptions on File Prior to Visit   Medication Sig Dispense Refill   • hydrocortisone (ANUSOL-HC) 25 MG Suppos Insert 1 Suppository in rectum every 12 hours. 30 Suppository 0   • Misc. Devices Misc Total body iron transfusion per pharmacy protocol; patient has iron deficiency anemia; fax to infusion center 1 Device 0   • clotrimazole-betamethasone  "(LOTRISONE) 1-0.05 % Cream Apply  to affected area(s) 2 times a day.     • polyethylene glycol 3350 (MIRALAX) Powder Take 17 g by mouth 2 times a day. 1 Bottle 3   • Multiple Vitamins-Iron (MULTIVITAMIN/IRON PO) Take 1 Tab by mouth every day.     • Ferrous Gluconate (IRON) 240 (27 FE) MG Tab Take 1 Tab by mouth every day.     • Calcium-Vitamin D-Vitamin K (CALCIUM SOFT CHEWS PO) Take 1 Tab by mouth every day.     • Probiotic Product (PROBIOTIC DAILY) Cap Take 1 Cap by mouth every day.     • vitamin D, Ergocalciferol, (DRISDOL) 58917 UNITS Cap capsule Take 50,000 Units by mouth every 7 days.       No current facility-administered medications on file prior to visit.        Allergies:    Allergies   Allergen Reactions   • Amoxicillin      rashes   • Penicillins      rashes   • Tetracycline      rashes   • Augmentin Rash     .       Vitals:    Vitals:    09/20/17 1824   BP: 118/70   Pulse: 68   Resp: 14   Temp: 36.7 °C (98 °F)   SpO2: 100%   Weight: 96.2 kg (212 lb)   Height: 1.651 m (5' 5\")       Physical Exam:    Constitutional: Vital signs reviewed. Appears well-developed and well-nourished. No acute distress.   Eyes: Sclera white, conjunctivae clear.  ENT: External ears normal. Hearing normal.  Cardiovascular: Peripheral pulses 2+.   Pulmonary/Chest: Respirations non-labored.  Musculoskeletal: Right shoulder: able to mostly do full active range of motion but with discomfort in right shoulder. Positive impingement sign right shoulder. Normal range of motion throughout right upper extremity otherwise. Normal gait. No muscular atrophy or weakness.  Neurological: Alert and oriented to person, place, and time. Muscle tone normal. Coordination normal. Light touch and sensation normal.   Skin: Mild bruising around right elbow.  Psychiatric: Normal mood and affect. Behavior is normal. Judgment and thought content normal.     Diagnostics:    DX-SHOULDER 2+ (Order #[id number]) on 9/20/17   Narrative       9/20/2017 6:34 " PM    HISTORY/REASON FOR EXAM:  Pain/Deformity Following Trauma  Right shoulder pain. Fall.    TECHNIQUE/EXAM DESCRIPTION AND NUMBER OF VIEWS:  3 views of the RIGHT shoulder.    COMPARISON: None    FINDINGS:    No acute fracture or dislocation.  No joint arthropathy.     Impression         1. No acute osseous abnormality.     Images and Rad report reviewed with her and copy of report to her.      Assessment / Plan:    1. Right shoulder injury, initial encounter  - DX-SHOULDER 2+ RIGHT; Future    2. Contusion of right shoulder, initial encounter  - DX-SHOULDER 2+ RIGHT; Future      Full duty.    May apply ice intermittently to right shoulder and may take over-the-counter Acetaminophen (Tylenol) as needed for pain.    Return on 9/28/17 or sooner if needed.

## 2017-09-28 ENCOUNTER — OCCUPATIONAL MEDICINE (OUTPATIENT)
Dept: URGENT CARE | Facility: PHYSICIAN GROUP | Age: 47
End: 2017-09-28
Payer: COMMERCIAL

## 2017-09-28 VITALS
WEIGHT: 213 LBS | TEMPERATURE: 98.7 F | HEART RATE: 88 BPM | HEIGHT: 65 IN | BODY MASS INDEX: 35.49 KG/M2 | RESPIRATION RATE: 16 BRPM | DIASTOLIC BLOOD PRESSURE: 72 MMHG | SYSTOLIC BLOOD PRESSURE: 110 MMHG | OXYGEN SATURATION: 95 %

## 2017-09-28 DIAGNOSIS — S49.91XD RIGHT SHOULDER INJURY, SUBSEQUENT ENCOUNTER: ICD-10-CM

## 2017-09-28 DIAGNOSIS — S40.011D CONTUSION OF RIGHT SHOULDER, SUBSEQUENT ENCOUNTER: ICD-10-CM

## 2017-09-28 PROCEDURE — 99214 OFFICE O/P EST MOD 30 MIN: CPT | Mod: 29 | Performed by: PHYSICIAN ASSISTANT

## 2017-09-28 NOTE — PROGRESS NOTES
"Chief Complaint   Patient presents with   • Shoulder Injury     WC FV, Shoulder injury, \"Shooting pain from shoulder to elbow\", R/ side       HISTORY OF PRESENT ILLNESS: Patient is a 47 y.o. female who presents today becauseShe is following up on work comp injury.    DOI: 9/15/17. Works for [employer] as . Was walking out of breakroom and slipped and fell. Landed on right side.  No previous problems with right shoulder. No 2nd job or other outside activity to have contributed to current symptoms. She has been told not to take anti-inflammatories due to history of Gastric Bypass. Can take Tylenol.  She was initially seen on 9/20/7/17, had negative x-ray, told to use Tylenol and ice. Since that time, she has been using ice and analgesic rub, but has not been taking very much Tylenol. She continues to have pain, primarily towards the end of the day. Denies any distal paresthesias       Patient Active Problem List    Diagnosis Date Noted   • Migraine without aura and without status migrainosus, not intractable 09/20/2017   • Lumbar degenerative disc disease 03/08/2017   • Neck pain 03/08/2017   • Iron deficiency anemia secondary to inadequate dietary iron intake 03/08/2017   • Status post hysterectomy 06/23/2016   • Slow transit constipation 06/23/2016   • S/P gastric bypass 12/21/2015   • Nutritional deficiency 12/21/2015   • Non morbid obesity due to excess calories 10/12/2015   • Low back pain 07/02/2014   • Obesity 07/02/2014       Allergies:Amoxicillin; Penicillins; Tetracycline; and Augmentin    Current Outpatient Prescriptions Ordered in [facility]   Medication Sig Dispense Refill   • Multiple Vitamins-Iron (MULTIVITAMIN/IRON PO) Take 1 Tab by mouth every day.     • Ferrous Gluconate (IRON) 240 (27 FE) MG Tab Take 1 Tab by mouth every day.     • Probiotic Product (PROBIOTIC DAILY) Cap Take 1 Cap by mouth every day.     • vitamin D, Ergocalciferol, (DRISDOL) 21292 UNITS Cap capsule Take 50,000 " "Units by mouth every 7 days.     • hydrocortisone (ANUSOL-HC) 25 MG Suppos Insert 1 Suppository in rectum every 12 hours. 30 Suppository 0   • Misc. Devices Misc Total body iron transfusion per pharmacy protocol; patient has iron deficiency anemia; fax to infusion center 1 Device 0   • clotrimazole-betamethasone (LOTRISONE) 1-0.05 % Cream Apply  to affected area(s) 2 times a day.     • polyethylene glycol 3350 (MIRALAX) Powder Take 17 g by mouth 2 times a day. 1 Bottle 3   • Calcium-Vitamin D-Vitamin K (CALCIUM SOFT CHEWS PO) Take 1 Tab by mouth every day.       No current [facility]-ordered facility-administered medications on file.        Past Medical History:   Diagnosis Date   • Anemia    • Anesthesia     nausea   • Arthritis     deg disc disease lower back   • Bowel habit changes     constipation   • Gynecological disorder     fibroids, irreg periods   • Hemorrhoids    • Obesity        Social History   Substance Use Topics   • Smoking status: Never Smoker   • Smokeless tobacco: Never Used   • Alcohol use No       Family Status   Relation Status   • Mother Alive   • Father Alive     Family History   Problem Relation Age of Onset   • Diabetes Father        ROS:  Review of Systems   Constitutional: Negative for fever, chills, weight loss and malaise/fatigue.   HENT: Negative for ear pain, nosebleeds, congestion, sore throat and neck pain.    Eyes: Negative for blurred vision.   Respiratory: Negative for cough, sputum production, shortness of breath and wheezing.    Cardiovascular: Negative for chest pain, palpitations, orthopnea and leg swelling.     Exam:  Blood pressure 110/72, pulse 88, temperature 37.1 °C (98.7 °F), resp. rate 16, height 1.651 m (5' 5\"), weight 96.6 kg (213 lb), SpO2 95 %.  General:  Well nourished, well developed female in NAD  Head:Normocephalic, atraumatic  Eyes: PERRLA, EOM within normal limits, no conjunctival injection, no scleral icterus, visual fields and acuity grossly intact.  Extremities: " no clubbing, cyanosis, or edema.Right shoulder is without any visual deformity, erythema, edema or ecchymosis. She has some tenderness to the lateral aspect and the posterior joint line. Range of motion is reasonable with pain primarily with internal rotation against resistance and she has pain with flexion. Distally she has good circulation, sensation and strength    Please note that this dictation was created using voice recognition software. I have made every reasonable attempt to correct obvious errors, but I expect that there are errors of grammar and possibly content that I did not discover before finalizing the note.    Assessment/Plan:  1. Contusion of right shoulder, subsequent encounter  REFERRAL TO PHYSICAL THERAPY Reason for Therapy: Eval/Treat/Report   2. Right shoulder injury, subsequent encounter  REFERRAL TO PHYSICAL THERAPY Reason for Therapy: Eval/Treat/Report   . Continue ice, recommended Tylenol, follow-up in 2 weeks

## 2017-09-28 NOTE — LETTER
[address] [place] [address]  Phone:  [phone] - Fax:  [phone]   [facility] Progress Report and Disability Certification  Date of Service: 9/28/2017   No Show:  No  Date / Time of Next Visit: 10/12/2017   Claim Information   Patient Name: Jen Mejía  Claim Number:     Employer:   [employer] Date of Injury: 9/15/2017     Insurer / TPA: [employer] ID / SSN:     Occupation: [employer]  Diagnosis: Diagnoses of Contusion of right shoulder, subsequent encounter and Right shoulder injury, subsequent encounter were pertinent to this visit.    Medical Information   Related to Industrial Injury? Yes    Subjective Complaints:  Right shoulder pain continues after injury at work on 9/15/2017   Objective Findings: Right shoulder is without any visual deformity, erythema, edema or ecchymosis. She has some tenderness to the lateral aspect and the posterior joint line. Range of motion is reasonable with pain primarily with internal rotation against resistance and she has pain with flexion. Distally she has good circulation, sensation and strength     Pre-Existing Condition(s):     Assessment:   Condition Improved  Comments:minimally improved    Status: Additional Care Required  Comments:follow-up in 2 weeks  Permanent Disability:No    Plan: MedicationPT  Comments:Tylenol    Diagnostics:      Comments:       Disability Information   Status: Released to Full Duty    From:  9/28/2017  Through: 10/12/2017 Restrictions are: Temporary   Physical Restrictions   Sitting:    Standing:    Stooping:    Bending:      Squatting:    Walking:    Climbing:    Pushing:      Pulling:    Other:    Reaching Above Shoulder (L):   Reaching Above Shoulder (R):       Reaching Below Shoulder (L):    Reaching Below Shoulder (R):      Not to exceed Weight Limits   Carrying(hrs):   Weight Limit(lb):   Lifting(hrs):   Weight  Limit(lb):     Comments:         Repetitive Actions   Hands: i.e. Fine Manipulations from Grasping:     Feet: i.e. Operating Foot Controls:     Driving / Operate Machinery:     Physician Name: Barak Bhatia P.A.-C. Physician Signature: BARAK Bedoya P.A.-C. e-Signature: Dr. Castillo Burns, Medical Director   Clinic Name / Location: [address] [address] Clinic Phone Number: Dept: [phone]   Appointment Time: 9:00 Am Visit Start Time: 9:12 AM   Check-In Time:  9:02 Am Visit Discharge Time: 9:43am   Original-Treating Physician or Chiropractor    Page 2-Insurer/TPA    Page 3-Employer    Page 4-Employee

## 2017-10-23 ENCOUNTER — OCCUPATIONAL MEDICINE (OUTPATIENT)
Dept: URGENT CARE | Facility: PHYSICIAN GROUP | Age: 47
End: 2017-10-23
Payer: COMMERCIAL

## 2017-10-23 VITALS
RESPIRATION RATE: 12 BRPM | OXYGEN SATURATION: 98 % | SYSTOLIC BLOOD PRESSURE: 112 MMHG | DIASTOLIC BLOOD PRESSURE: 70 MMHG | HEIGHT: 65 IN | HEART RATE: 68 BPM | WEIGHT: 213 LBS | BODY MASS INDEX: 35.49 KG/M2 | TEMPERATURE: 98.8 F

## 2017-10-23 DIAGNOSIS — S40.011D CONTUSION OF RIGHT SHOULDER, SUBSEQUENT ENCOUNTER: ICD-10-CM

## 2017-10-23 PROCEDURE — 99213 OFFICE O/P EST LOW 20 MIN: CPT | Performed by: FAMILY MEDICINE

## 2017-10-23 ASSESSMENT — ENCOUNTER SYMPTOMS
FOCAL WEAKNESS: 0
SENSORY CHANGE: 0
ROS SKIN COMMENTS: NO ABRASION OR LACERATION
NECK PAIN: 0

## 2017-10-23 NOTE — LETTER
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 [employer]  Diagnosis: The encounter diagnosis was Contusion of right shoulder, subsequent encounter.    Medical Information   Related to Industrial Injury? Yes    Subjective Complaints:  DOI: 9/15/2017  F/u visit right shoulder contusion due to slip and fall on water at work. She has completed 1 week of PT and showing slight improvement. She continues to have sharp pain with reaching in front and grasping. OTC tylenol without relief. 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 No prior injury or surgery. Right hand dominant.      Objective Findings: Right shoulder: no point tenderness, pain reproduced with abduction at 70 degrees and internal rotation. No crepitus. Distal neuro/vascular intact.      Pre-Existing Condition(s):     Assessment:   Condition Improved    Status: Additional Care Required  Comments:continue PT  Permanent Disability:No    Plan:      Diagnostics:      Comments:       Disability Information   Status: Released to Full Duty    From:  10/23/2017  Through: 11/9/2017 Restrictions are:     Physical Restrictions   Sitting:    Standing:    Stooping:    Bending:      Squatting:    Walking:    Climbing:    Pushing:      Pulling:    Other:    Reaching Above Shoulder (L):   Reaching Above Shoulder (R):       Reaching Below Shoulder (L):    Reaching Below Shoulder (R):      Not to exceed Weight Limits   Carrying(hrs):   Weight Limit(lb):   Lifting(hrs):   Weight  Limit(lb):     Comments:      Repetitive Actions    Hands: i.e. Fine Manipulations from Grasping:     Feet: i.e. Operating Foot Controls:     Driving / Operate Machinery:     Physician Name: Sawyer Meza M.D. Physician Signature:   e-Signature:  , Medical Director   Clinic Name / Location: [address] [address] Clinic Phone Number: Dept: [phone]   Appointment Time: 9:15 Am Visit Start Time: 9:06 AM   Check-In Time:  9:00 Am Visit Discharge Time: 9:40 AM   Original-Treating Physician or Chiropractor    Page 2-Insurer/TPA    Page 3-Employer    Page 4-Employee

## 2017-10-23 NOTE — PROGRESS NOTES
"Subjective:      Jen Mejía is a 47 y.o. female who presents with Follow-Up (Shoulder pain,  still having the sharp pain.  Has been going to therapy for 1 wk now)      DOI: 9/15/2017  F/u visit right shoulder contusion due to slip and fall on water at work. She has completed 1 week of PT and showing slight improvement. She continues to have sharp pain with reaching in front and grasping. OTC tylenol without relief. 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 No prior injury or surgery. Right hand dominant.        HPI    Review of Systems   Musculoskeletal: Negative for neck pain.        No elbow pain   Skin:        No abrasion or laceration     Neurological: Negative for sensory change and focal weakness.          Objective:     /70   Pulse 68   Temp 37.1 °C (98.8 °F)   Resp 12   Ht 1.651 m (5' 5\")   Wt 96.6 kg (213 lb)   SpO2 98%   BMI 35.45 kg/m²      Physical Exam   Constitutional: She appears well-developed and well-nourished. No distress.   Neurological:   Speech is clear. Patient is appropriate and cooperative.   Skin: Skin is warm and dry.       Right shoulder: no point tenderness, pain reproduced with abduction at 70 degrees and internal rotation. No crepitus. Distal neuro/vascular intact.          Assessment/Plan:     1. Contusion of right shoulder, subsequent encounter  Continue PT   Tylenol prn  F/u 11/9. At that point expect significant improvement and if not consider transfer care to [facility] physicians.       "

## 2017-11-09 ENCOUNTER — OCCUPATIONAL MEDICINE (OUTPATIENT)
Dept: URGENT CARE | Facility: PHYSICIAN GROUP | Age: 47
End: 2017-11-09
Payer: COMMERCIAL

## 2017-11-09 VITALS
TEMPERATURE: 99.1 F | OXYGEN SATURATION: 95 % | BODY MASS INDEX: 35.65 KG/M2 | DIASTOLIC BLOOD PRESSURE: 80 MMHG | HEIGHT: 65 IN | WEIGHT: 214 LBS | HEART RATE: 94 BPM | SYSTOLIC BLOOD PRESSURE: 112 MMHG | RESPIRATION RATE: 18 BRPM

## 2017-11-09 DIAGNOSIS — S46.911D STRAIN OF RIGHT SHOULDER, SUBSEQUENT ENCOUNTER: ICD-10-CM

## 2017-11-09 PROCEDURE — 99213 OFFICE O/P EST LOW 20 MIN: CPT | Mod: 29 | Performed by: PHYSICIAN ASSISTANT

## 2017-11-09 NOTE — LETTER
"   [facility]  [address] [place] [address] [place] [address]  Phone:  [phone] - Fax:  [phone]   [facility] Progress Report and Disability Certification  Date of Service: 11/9/2017   No Show:  No  Date / Time of Next Visit: 11/23/2017   Claim Information   Patient Name: Jen Mejía  Claim Number:     Employer:   [employer]  Date of Injury: 9/15/2017     Insurer / TPA: Bruno Valverde [facility]  ID / SSN:     Occupation: BR. M[employer]  Diagnosis: The encounter diagnosis was Strain of right shoulder, subsequent encounter.    Medical Information   Related to Industrial Injury? Yes    Subjective Complaints:  DOI: 9/15/17. Patient is here for Worker's Compensation follow-up. Works for [employer] as . Was walking out of breakroom and slipped and fell. Landed on right side. Patient has completed several sessions of physical therapy and has one more session tomorrow. She continues to have a fair amount of pain with certain movements and significant aching by the end of the day even with day-to-day activities. She has worsening pain with abduction, external rotation, and reaching behind her back to get dressed. She denies distal paresthesias. Feels she can still do her regular work duties.    Objective Findings: Blood pressure 112/80, pulse 94, temperature 37.3 °C (99.1 °F), resp. rate 18, height 1.651 m (5' 5\"), weight 97.1 kg (214 lb), SpO2 95 %.  General: Well developed, well nourished. No distress.  HEENT:Head is grossly normal.  Pulmonary: No respiratory distress noted.  Cardiovascular: Radial pulses are strong and equal bilaterally.  Neurologic: No sensory deficit noted.  Extremities: No localized tenderness noted of the right shoulder. Worsening pain with abduction. Almost full range of motion. Unable to reach completely behind her back. Worsening pain with external rotation.  Skin: Warm, dry, good turgor. No rashes in visible areas.   "   Psych: Normal mood. Alert and oriented x3. Judgment and insight is normal.   Pre-Existing Condition(s):     Assessment:   Condition Same    Status: Additional Care Required  Permanent Disability:No    Plan: Medication  Comments:OTC meds only    Diagnostics:      Comments:  PLAN: Continue without restrictions. May use acetaminophen as needed for pain. Referring patient to the [facility] for follow-up next week.    Disability Information   Status: Released to Full Duty    From:  11/9/2017  Through: 11/23/2017 Restrictions are:     Physical Restrictions   Sitting:    Standing:    Stooping:    Bending:      Squatting:    Walking:    Climbing:    Pushing:      Pulling:    Other:    Reaching Above Shoulder (L):   Reaching Above Shoulder (R):       Reaching Below Shoulder (L):    Reaching Below Shoulder (R):      Not to exceed Weight Limits   Carrying(hrs):   Weight Limit(lb):   Lifting(hrs):   Weight  Limit(lb):     Comments:      Repetitive Actions   Hands: i.e. Fine Manipulations from Grasping:     Feet: i.e. Operating Foot Controls:     Driving / Operate Machinery:     Physician Name: Kelly Roach P.A.-C. Physician Signature: KELLY Mcdonald P.A.-C. e-Signature: Dr. Castillo Burns, Medical Director   Clinic Name / Location: [address] [address] Clinic Phone Number: Dept: [phone]   Appointment Time: 9:00 Am Visit Start Time: 9:22 AM   Check-In Time:  9:02 Am Visit Discharge Time:  9:59AM   Original-Treating Physician or Chiropractor    Page 2-Insurer/TPA    Page 3-Employer    Page 4-Employee

## 2017-11-09 NOTE — PROGRESS NOTES
Chief Complaint   Patient presents with   • Shoulder Injury     WC FV, Pt slipped and fell, injuring shoulder, minimal relief from last visit       HISTORY OF PRESENT ILLNESS: Patient is a 47 y.o. female who presents today for the following:    DOI: 9/15/17. Patient is here for Worker's Compensation follow-up. Works for [employer] as . Was walking out of breakroom and slipped and fell. Landed on right side. Patient has completed several sessions of physical therapy and has one more session tomorrow. She continues to have a fair amount of pain with certain movements and significant aching by the end of the day even with day-to-day activities. She has worsening pain with abduction, external rotation, and reaching behind her back to get dressed. She denies distal paresthesias. Feels she can still do her regular work duties.     Patient Active Problem List    Diagnosis Date Noted   • Migraine without aura and without status migrainosus, not intractable 09/20/2017   • Lumbar degenerative disc disease 03/08/2017   • Neck pain 03/08/2017   • Iron deficiency anemia secondary to inadequate dietary iron intake 03/08/2017   • Status post hysterectomy 06/23/2016   • Slow transit constipation 06/23/2016   • S/P gastric bypass 12/21/2015   • Nutritional deficiency 12/21/2015   • Non morbid obesity due to excess calories 10/12/2015   • Low back pain 07/02/2014   • Obesity 07/02/2014       Allergies:Amoxicillin; Penicillins; Tetracycline; and Augmentin    Current Outpatient Prescriptions Ordered in [facility]   Medication Sig Dispense Refill   • Multiple Vitamins-Iron (MULTIVITAMIN/IRON PO) Take 1 Tab by mouth every day.     • Ferrous Gluconate (IRON) 240 (27 FE) MG Tab Take 1 Tab by mouth every day.     • Calcium-Vitamin D-Vitamin K (CALCIUM SOFT CHEWS PO) Take 1 Tab by mouth every day.     • Probiotic Product (PROBIOTIC DAILY) Cap Take 1 Cap by mouth every day.     • vitamin D, Ergocalciferol, (DRISDOL) 80272  "UNITS Cap capsule Take 50,000 Units by mouth every 7 days.     • hydrocortisone (ANUSOL-HC) 25 MG Suppos Insert 1 Suppository in rectum every 12 hours. 30 Suppository 0   • Misc. Devices Misc Total body iron transfusion per pharmacy protocol; patient has iron deficiency anemia; fax to infusion center 1 Device 0   • clotrimazole-betamethasone (LOTRISONE) 1-0.05 % Cream Apply  to affected area(s) 2 times a day.     • polyethylene glycol 3350 (MIRALAX) Powder Take 17 g by mouth 2 times a day. 1 Bottle 3     No current Epic-ordered facility-administered medications on file.        Past Medical History:   Diagnosis Date   • Anemia    • Anesthesia     nausea   • Arthritis     deg disc disease lower back   • Bowel habit changes     constipation   • Gynecological disorder     fibroids, irreg periods   • Hemorrhoids    • Obesity        Social History   Substance Use Topics   • Smoking status: Never Smoker   • Smokeless tobacco: Never Used   • Alcohol use No       Family Status   Relation Status   • Mother Alive   • Father Alive     Family History   Problem Relation Age of Onset   • Diabetes Father        ROS:    Review of Systems   Constitutional: Negative for fever, chills, weight loss and malaise/fatigue.     Exam:  Blood pressure 112/80, pulse 94, temperature 37.3 °C (99.1 °F), resp. rate 18, height 1.651 m (5' 5\"), weight 97.1 kg (214 lb), SpO2 95 %.  General: Well developed, well nourished. No distress.  HEENT:Head is grossly normal.  Pulmonary: No respiratory distress noted.  Cardiovascular: Radial pulses are strong and equal bilaterally.  Neurologic: No sensory deficit noted.  Extremities: No localized tenderness noted of the right shoulder. Worsening pain with abduction. Almost full range of motion. Unable to reach completely behind her back. Worsening pain with external rotation.  Skin: Warm, dry, good turgor. No rashes in visible areas.   Psych: Normal mood. Alert and oriented x3. Judgment and insight is " normal.    Assessment/Plan:  Continue without restrictions. May use acetaminophen as needed for pain. Referring patient to the [facility] clinic for follow-up next week.  1. Strain of right shoulder, subsequent encounter

## 2017-11-21 ENCOUNTER — OCCUPATIONAL MEDICINE (OUTPATIENT)
Dept: OCCUPATIONAL MEDICINE | Facility: CLINIC | Age: 47
End: 2017-11-21
Payer: COMMERCIAL

## 2017-11-21 VITALS
DIASTOLIC BLOOD PRESSURE: 74 MMHG | OXYGEN SATURATION: 98 % | BODY MASS INDEX: 35.65 KG/M2 | TEMPERATURE: 98.6 F | WEIGHT: 214 LBS | SYSTOLIC BLOOD PRESSURE: 118 MMHG | HEART RATE: 71 BPM | RESPIRATION RATE: 16 BRPM | HEIGHT: 65 IN

## 2017-11-21 DIAGNOSIS — S46.911D STRAIN OF RIGHT SHOULDER, SUBSEQUENT ENCOUNTER: ICD-10-CM

## 2017-11-21 PROCEDURE — 99204 OFFICE O/P NEW MOD 45 MIN: CPT | Performed by: PREVENTIVE MEDICINE

## 2017-11-21 ASSESSMENT — PAIN SCALES - GENERAL: PAINLEVEL: 5=MODERATE PAIN

## 2017-11-21 NOTE — PROGRESS NOTES
"Subjective:      Jen Mejía is a 47 y.o. female who presents with Follow-Up (WC DOI 9/15/17 - R Shoulder - same - room 2)      DOI: 9/15/17. Patient was walking out of breakroom and slipped and fell landed on right shoulder. Seen in [facility], XR negative. Completed physical therapy with only some improvement. Patient states that overall right shoulder pain is about the same. She states then the pain is worse with movement spelled the shoulder. Pain is located mostly on the lateral aspect of the shoulder. Notes some radiating pain up to the neck. Denies any numbness or tingling. Completed physical therapy with mild improvement. Not taking any medications currently.     HPI    ROS  ROS: All systems were reviewed on intake form, form was reviewed and signed. See scanned documents in media. Pertinent positives and negatives included in HPI.    PMH: No pertinent past medical history to this problem  MEDS: Medications were reviewed in Epic  ALLERGIES:   Allergies   Allergen Reactions   • Amoxicillin      rashes   • Penicillins      rashes   • Tetracycline      rashes   • Augmentin Rash     .     SOCHX: Works as a  at [employer]   FH: No pertinent family history to this problem     Objective:     /74   Pulse 71   Temp 37 °C (98.6 °F)   Resp 16   Ht 1.651 m (5' 5\")   Wt 97.1 kg (214 lb)   SpO2 98%   BMI 35.61 kg/m²      Physical Exam   Constitutional: She is oriented to person, place, and time. She appears well-developed and well-nourished.   HENT:   Right Ear: External ear normal.   Left Ear: External ear normal.   Eyes: Conjunctivae and EOM are normal.   Cardiovascular: Normal rate.    Pulmonary/Chest: Effort normal. No respiratory distress.   Neurological: She is alert and oriented to person, place, and time.   Skin: Skin is warm and dry.   Psychiatric: She has a normal mood and affect. Judgment normal.       Right Shoulder: No gross deformity. Tenderness to palpation lateral " shoulder. Slight decrease in flexion abduction due to pain. Slight weakness with abduction and external rotation. Reflexes intact.       Assessment/Plan:     1. Strain of right shoulder, subsequent encounter  - REFERRAL TO RADIOLOGY  - MR-SHOULDER-W/O RIGHT; Future    Referral MRI Right Shoulder  OTC ibuprofen as needed  Continue PT, referral for more visits  Follow up 3 weeks

## 2017-11-21 NOTE — LETTER
[address],   [address] [place] [address]  Phone:  [phone] - Fax:  [phone]   [facility] Progress Report and Disability Certification  Date of Service: 11/21/2017   No Show:  No  Date / Time of Next Visit: 12/12/2017 @ 1pm   Claim Information   Patient Name: Jen Mejía  Claim Number:     Employer:   [employer]  Date of Injury: 9/15/2017     Insurer / TPA: Bruno Valverde [facility]  ID / SSN:     Occupation: [employer]  Diagnosis: The encounter diagnosis was Strain of right shoulder, subsequent encounter.    Medical Information   Related to Industrial Injury? Yes    Subjective Complaints:  DOI: 9/15/17. Patient was walking out of breakroom and slipped and fell landed on right shoulder. Seen in [facility], XR negative. Completed physical therapy with only some improvement. Patient states that overall right shoulder pain is about the same. She states then the pain is worse with movement spelled the shoulder. Pain is located mostly on the lateral aspect of the shoulder. Notes some radiating pain up to the neck. Denies any numbness or tingling. Completed physical therapy with mild improvement. Not taking any medications currently.   Objective Findings: Right Shoulder: No gross deformity. Tenderness to palpation lateral shoulder. Slight decrease in flexion abduction due to pain. Slight weakness with abduction and external rotation. Reflexes intact.   Pre-Existing Condition(s):     Assessment:   Condition Same    Status: Additional Care Required  Permanent Disability:No    Plan:      Diagnostics:      Comments:  Referral MRI Right Shoulder  OTC ibuprofen as needed  Continue PT, referral for more visits  Follow up 3 weeks    Disability Information   Status: Released to Full Duty    From:  11/21/2017  Through: 12/12/2017 Restrictions are:     Physical Restrictions   Sitting:    Standing:    Stooping:    Bending:      Squatting:    Walking:   Climbing:    Pushing:      Pulling:    Other:    Reaching Above Shoulder (L):   Reaching Above Shoulder (R):       Reaching Below Shoulder (L):    Reaching Below Shoulder (R):      Not to exceed Weight Limits   Carrying(hrs):   Weight Limit(lb):   Lifting(hrs):   Weight  Limit(lb):     Comments:      Repetitive Actions   Hands: i.e. Fine Manipulations from Grasping:     Feet: i.e. Operating Foot Controls:     Driving / Operate Machinery:     Physician Name: Shai Ye D.O. Physician Signature: SHAI Hess D.O. e-Signature: Dr. Castillo Burns, Medical Director   Clinic Name / Location: [address],   [address]  [place] [address] Clinic Phone Number: Dept: [phone]   Appointment Time: 10:20 Am Visit Start Time: 10:12 AM   Check-In Time:  10:08 Am Visit Discharge Time:  11:07am   Original-Treating Physician or Chiropractor    Page 2-Insurer/TPA    Page 3-Employer    Page 4-Employee

## 2017-12-12 ENCOUNTER — OCCUPATIONAL MEDICINE (OUTPATIENT)
Dept: OCCUPATIONAL MEDICINE | Facility: CLINIC | Age: 47
End: 2017-12-12
Payer: COMMERCIAL

## 2017-12-12 ENCOUNTER — OFFICE VISIT (OUTPATIENT)
Dept: URGENT CARE | Facility: CLINIC | Age: 47
End: 2017-12-12
Payer: COMMERCIAL

## 2017-12-12 VITALS
BODY MASS INDEX: 36.37 KG/M2 | SYSTOLIC BLOOD PRESSURE: 112 MMHG | OXYGEN SATURATION: 97 % | WEIGHT: 213 LBS | TEMPERATURE: 99.2 F | RESPIRATION RATE: 14 BRPM | HEIGHT: 64 IN | DIASTOLIC BLOOD PRESSURE: 74 MMHG | HEART RATE: 63 BPM

## 2017-12-12 VITALS
OXYGEN SATURATION: 98 % | HEART RATE: 70 BPM | HEIGHT: 64 IN | SYSTOLIC BLOOD PRESSURE: 126 MMHG | DIASTOLIC BLOOD PRESSURE: 84 MMHG | BODY MASS INDEX: 36.54 KG/M2 | WEIGHT: 214 LBS | RESPIRATION RATE: 14 BRPM | TEMPERATURE: 97.6 F

## 2017-12-12 DIAGNOSIS — J02.9 EXUDATIVE PHARYNGITIS: Primary | ICD-10-CM

## 2017-12-12 DIAGNOSIS — J01.40 ACUTE NON-RECURRENT PANSINUSITIS: ICD-10-CM

## 2017-12-12 DIAGNOSIS — J06.9 URI, ACUTE: ICD-10-CM

## 2017-12-12 DIAGNOSIS — S46.911D STRAIN OF RIGHT SHOULDER, SUBSEQUENT ENCOUNTER: ICD-10-CM

## 2017-12-12 PROCEDURE — 99212 OFFICE O/P EST SF 10 MIN: CPT | Performed by: PREVENTIVE MEDICINE

## 2017-12-12 PROCEDURE — 99214 OFFICE O/P EST MOD 30 MIN: CPT | Performed by: PHYSICIAN ASSISTANT

## 2017-12-12 RX ORDER — CLINDAMYCIN HYDROCHLORIDE 300 MG/1
300 CAPSULE ORAL 3 TIMES DAILY
Qty: 30 CAP | Refills: 0 | Status: SHIPPED | OUTPATIENT
Start: 2017-12-12 | End: 2018-09-11

## 2017-12-12 RX ORDER — METHYLPREDNISOLONE 4 MG/1
TABLET ORAL
Qty: 21 TAB | Refills: 0 | Status: SHIPPED | OUTPATIENT
Start: 2017-12-12 | End: 2018-04-12

## 2017-12-12 ASSESSMENT — PAIN SCALES - GENERAL: PAINLEVEL: 3=SLIGHT PAIN

## 2017-12-12 NOTE — PROGRESS NOTES
Subjective:      Pt is a 47 y.o. female who presents with Pharyngitis (x a few days and states her son had strep 2 weeks ago.)            HPI  PT presents to  clinic today complaining of sore throat,  pressure in ears,  fatigue, runny nose. PT denies CP, SOB, NVD, abdominal pain, joint pain. PT states these symptoms began around 3 days ago and that the pt's son had STREP throat 2 weeks ago. Pt has not taken any RX medications for this condition. PT states the pain is a 7/10 with swallowing, aching in nature and worse at night. The pt's medication list, problem list, and allergies have been evaluated and reviewed during today's visit.      PMH:  Past Medical History:   Diagnosis Date   • Anemia    • Anesthesia     nausea   • Arthritis     deg disc disease lower back   • Bowel habit changes     constipation   • Gynecological disorder     fibroids, irreg periods   • Hemorrhoids    • Obesity        PSH:  Past Surgical History:   Procedure Laterality Date   • VAGINAL HYSTERECTOMY SCOPE TOTAL  6/9/2016    Procedure: VAGINAL HYSTERECTOMY SCOPE TOTAL, BILATERAL SALPINGECTOMY;  Surgeon: Hue Jeong M.D.;  Location: SURGERY SAME DAY [facility] ORS;  Service:    • CYSTOSCOPY N/A 6/9/2016    Procedure: CYSTOSCOPY;  Surgeon: Hue Jeong M.D.;  Location: SURGERY SAME DAY [facility] ORS;  Service:    • GASTRIC BYPASS LAPAROSCOPIC  10/12/2015    Procedure: GASTRIC BYPASS LAPAROSCOPIC SUSHIL EN Y;  Surgeon: John H Ganser, M.D.;  Location: [facility]ERY [facility];  Service:    • ACL RECONSTRUCTION     • CHOLECYSTECTOMY     • KNEE ARTHROSCOPY      multiple   • TONSILLECTOMY     • US-NEEDLE CORE BX-BREAST PANEL         Fam Hx:    family history includes Diabetes in her father.  Family Status   Relation Status   • Mother Alive   • Father Alive       Soc HX:  Social History     Social History   • Marital status:      Spouse name: N/A   • Number of children: N/A   • Years of education: N/A     Occupational History   • Not on  file.     Social History Main Topics   • Smoking status: Never Smoker   • Smokeless tobacco: Never Used   • Alcohol use No   • Drug use: No   • Sexual activity: Yes     Partners: Male     Other Topics Concern   • Not on file     Social History Narrative   • No narrative on file         Medications:    Current Outpatient Prescriptions:   •  clindamycin (CLEOCIN) 300 MG Cap, Take 1 Cap by mouth 3 times a day., Disp: 30 Cap, Rfl: 0  •  MethylPREDNISolone (MEDROL DOSEPAK) 4 MG Tablet Therapy Pack, Use as directed, Disp: 21 Tab, Rfl: 0  •  Multiple Vitamins-Iron (MULTIVITAMIN/IRON PO), Take 1 Tab by mouth every day., Disp: , Rfl:   •  Ferrous Gluconate (IRON) 240 (27 FE) MG Tab, Take 1 Tab by mouth every day., Disp: , Rfl:   •  Calcium-Vitamin D-Vitamin K (CALCIUM SOFT CHEWS PO), Take 1 Tab by mouth every day., Disp: , Rfl:   •  Probiotic Product (PROBIOTIC DAILY) Cap, Take 1 Cap by mouth every day., Disp: , Rfl:   •  vitamin D, Ergocalciferol, (DRISDOL) 33494 UNITS Cap capsule, Take 50,000 Units by mouth every 7 days., Disp: , Rfl:   •  hydrocortisone (ANUSOL-HC) 25 MG Suppos, Insert 1 Suppository in rectum every 12 hours., Disp: 30 Suppository, Rfl: 0  •  Misc. Devices Misc, Total body iron transfusion per pharmacy protocol; patient has iron deficiency anemia; fax to infusion center, Disp: 1 Device, Rfl: 0  •  clotrimazole-betamethasone (LOTRISONE) 1-0.05 % Cream, Apply  to affected area(s) 2 times a day., Disp: , Rfl:   •  polyethylene glycol 3350 (MIRALAX) Powder, Take 17 g by mouth 2 times a day., Disp: 1 Bottle, Rfl: 3      Allergies:  Amoxicillin; Penicillins; Tetracycline; and Augmentin    ROS  Constitutional: Positive for malaise/fatigue.   HENT: Positive for congestion and sore throat. Negative for ear pain.    Eyes: Negative for blurred vision, double vision and photophobia.   Respiratory:  Negative for hemoptysis, shortness of breath and wheezing.    Cardiovascular: Negative for chest pain and palpitations.  "  Gastrointestinal: Negative for nausea, vomiting, abdominal pain, diarrhea and constipation.   Genitourinary: Negative for dysuria and flank pain.   Musculoskeletal: Negative for falls and myalgias.   Skin: Negative for itching and rash.   Neurological:  Negative for dizziness and tingling.   Endo/Heme/Allergies: Does not bruise/bleed easily.   Psychiatric/Behavioral: Negative for depression. The patient is not nervous/anxious.             Objective:     /74   Pulse 63   Temp 37.3 °C (99.2 °F)   Resp 14   Ht 1.626 m (5' 4\")   Wt 96.6 kg (213 lb)   SpO2 97%   BMI 36.56 kg/m²      Physical Exam      Constitutional: PT is oriented to person, place, and time. PT appears well-developed and well-nourished. No distress.   HENT:   Head: Normocephalic and atraumatic.   Right Ear: Hearing, tympanic membrane, external ear and ear canal normal.   Left Ear: Hearing, tympanic membrane, external ear and ear canal normal.   Nose: Mucosal edema, rhinorrhea and sinus tenderness present. Right sinus exhibits frontal sinus tenderness. Left sinus exhibits frontal sinus tenderness.   Mouth/Throat: Uvula is midline. Mucous membranes are pale. Posterior oropharyngeal edema and posterior oropharyngeal erythema with exudate noted on exam   Eyes: Conjunctivae normal and EOM are normal. Pupils are equal, round, and reactive to light.   Neck: Normal range of motion. Neck supple. No thyromegaly present.   Cardiovascular: Normal rate, regular rhythm, normal heart sounds and intact distal pulses.  Exam reveals no gallop and no friction rub.    No murmur heard.  Pulmonary/Chest: Effort normal and breath sounds normal. No respiratory distress. PT has no wheezes. PT has no rales. PT exhibits no tenderness.   Abdominal: Soft. Bowel sounds are normal. PT exhibits no distension and no mass. There is no tenderness. There is no rebound and no guarding.   Musculoskeletal: Normal range of motion. PT exhibits no edema and no tenderness. "   Lymphadenopathy:     PT has no cervical adenopathy.   Neurological: PT is alert and oriented to person, place, and time. PT displays normal reflexes. No cranial nerve deficit. PT exhibits normal muscle tone. Coordination normal.   Skin: Skin is warm and dry. No rash noted. No erythema.   Psychiatric: PT has a normal mood and affect. PT behavior is normal. Judgment and thought content normal.          Assessment/Plan:     1. Exudative pharyngitis    - clindamycin (CLEOCIN) 300 MG Cap; Take 1 Cap by mouth 3 times a day.  Dispense: 30 Cap; Refill: 0  - MethylPREDNISolone (MEDROL DOSEPAK) 4 MG Tablet Therapy Pack; Use as directed  Dispense: 21 Tab; Refill: 0    2. Acute non-recurrent pansinusitis    - clindamycin (CLEOCIN) 300 MG Cap; Take 1 Cap by mouth 3 times a day.  Dispense: 30 Cap; Refill: 0  - MethylPREDNISolone (MEDROL DOSEPAK) 4 MG Tablet Therapy Pack; Use as directed  Dispense: 21 Tab; Refill: 0    3. URI, acute    - MethylPREDNISolone (MEDROL DOSEPAK) 4 MG Tablet Therapy Pack; Use as directed  Dispense: 21 Tab; Refill: 0    Rest, fluids encouraged.  OTC decongestant for congestion/cough  AVS with medical info given.  Pt was in full understanding and agreement with the plan.  Follow-up as needed if symptoms worsen or fail to improve.

## 2017-12-12 NOTE — PROGRESS NOTES
"Subjective:      Jen Mejía is a 47 y.o. female who presents with Follow-Up (WC DOI 09/15/2017 - R Shoulder - better- room 3)      DOI: 9/15/17. Patient was walking out of breakroom and slipped and fell landed on right shoulder. Patient states that her right shoulder pain has been improving. She states she had many days with little to no pain, but worsened slightly when she drove to [place] of the day. Overall doing well and has good range of motion.     HPI    ROS       Objective:     /84   Pulse 70   Temp 36.4 °C (97.6 °F)   Resp 14   Ht 1.626 m (5' 4\")   Wt 97.1 kg (214 lb)   SpO2 98%   BMI 36.73 kg/m²      Physical Exam    Right Shoulder: No gross deformity. No tenderness. Full range of motion. Strength intact.       Assessment/Plan:     1. Strain of right shoulder, subsequent encounter  MRI Right Shoulder: Mild rotator cuff tendinopathy. Subacromial/Subdeltoid bursitis.  Findings on MRI likely from pre-existing condition, would not be from a fall, but fall likely exacerbated conditions.  Recommend home exercises as prescribed by physical therapy. Previously  Placed at [facility], no ratable condition  Advised patient to seek care with primary care physician with any flareups of pain    "

## 2017-12-12 NOTE — LETTER
[address],   [address] [place] [address]  Phone:  [phone] - Fax:  [phone]   [facility] Progress Report and Disability Certification  Date of Service: 12/12/2017   No Show:  No  Date / Time of Next Visit:  MMI   Claim Information   Patient Name: Jen Mejía  Claim Number:     Employer:   [employer] Date of Injury: 9/15/2017     Insurer / TPA: Bruno Valverde [facility]  ID / SSN:     Occupation: [employer]. [employer]  Diagnosis: The encounter diagnosis was Strain of right shoulder, subsequent encounter.    Medical Information   Related to Industrial Injury? No    Subjective Complaints:  DOI: 9/15/17. Patient was walking out of breakroom and slipped and fell landed on right shoulder. Patient states that her right shoulder pain has been improving. She states she had many days with little to no pain, but worsened slightly when she drove to [place] of the day. Overall doing well and has good range of motion.   Objective Findings: Right Shoulder: No gross deformity. No tenderness. Full range of motion. Strength intact.   Pre-Existing Condition(s):     Assessment:   Condition Improved    Status: Discharged /  MMI  Permanent Disability:No    Plan:      Diagnostics:      Comments:  MRI Right Shoulder: Mild rotator cuff tendinopathy. Subacromial/Subdeltoid bursitis.  Findings on MRI likely from pre-existing condition, would not be from a fall, but fall likely exacerbated conditions.  Recommend home exercises as prescribed by phy  sical therapy. Previously  Placed at [facility], no ratable condition  Advised patient to seek care with primary care physician with any flareups of pain      Disability Information   Status: Released to Full Duty    From:  12/12/2017  Through:   Restrictions are:     Physical Restrictions   Sitting:    Standing:    Stooping:    Bending:      Squatting:    Walking:    Climbing:    Pushing:      Pulling:    Other:    Reaching  Above Shoulder (L):   Reaching Above Shoulder (R):       Reaching Below Shoulder (L):    Reaching Below Shoulder (R):      Not to exceed Weight Limits   Carrying(hrs):   Weight Limit(lb):   Lifting(hrs):   Weight  Limit(lb):     Comments:      Repetitive Actions   Hands: i.e. Fine Manipulations from Grasping:     Feet: i.e. Operating Foot Controls:     Driving / Operate Machinery:     Physician Name: Shai Ye D.O. Physician Signature: SHAI Hess D.O. e-Signature: Dr. Castillo Burns, Medical Director   Clinic Name / Location: [address],   [address] [place] [address] Clinic Phone Number: Dept: [phone]   Appointment Time: 1:00 Pm Visit Start Time: 1:01 PM   Check-In Time:  12:48 Pm Visit Discharge Time:  1:16 PM   Original-Treating Physician or Chiropractor    Page 2-Insurer/TPA    Page 3-Employer    Page 4-Employee

## 2018-01-17 ENCOUNTER — HOSPITAL ENCOUNTER (OUTPATIENT)
Facility: MEDICAL CENTER | Age: 48
End: 2018-01-17
Attending: PHYSICIAN ASSISTANT
Payer: COMMERCIAL

## 2018-01-17 ENCOUNTER — OFFICE VISIT (OUTPATIENT)
Dept: URGENT CARE | Facility: PHYSICIAN GROUP | Age: 48
End: 2018-01-17
Payer: COMMERCIAL

## 2018-01-17 VITALS
HEIGHT: 64 IN | OXYGEN SATURATION: 97 % | SYSTOLIC BLOOD PRESSURE: 118 MMHG | WEIGHT: 214 LBS | DIASTOLIC BLOOD PRESSURE: 78 MMHG | BODY MASS INDEX: 36.54 KG/M2 | RESPIRATION RATE: 16 BRPM | HEART RATE: 80 BPM | TEMPERATURE: 98.1 F

## 2018-01-17 DIAGNOSIS — E66.9 OBESITY (BMI 30-39.9): ICD-10-CM

## 2018-01-17 DIAGNOSIS — N30.01 ACUTE CYSTITIS WITH HEMATURIA: ICD-10-CM

## 2018-01-17 DIAGNOSIS — R30.0 DYSURIA: ICD-10-CM

## 2018-01-17 LAB
APPEARANCE UR: NORMAL
BILIRUB UR STRIP-MCNC: NORMAL MG/DL
COLOR UR AUTO: NORMAL
GLUCOSE UR STRIP.AUTO-MCNC: NORMAL MG/DL
KETONES UR STRIP.AUTO-MCNC: NORMAL MG/DL
LEUKOCYTE ESTERASE UR QL STRIP.AUTO: NORMAL
NITRITE UR QL STRIP.AUTO: POSITIVE
PH UR STRIP.AUTO: 6.5 [PH] (ref 5–8)
PROT UR QL STRIP: 30 MG/DL
RBC UR QL AUTO: NORMAL
SP GR UR STRIP.AUTO: 1.01
UROBILINOGEN UR STRIP-MCNC: NORMAL MG/DL

## 2018-01-17 PROCEDURE — 99214 OFFICE O/P EST MOD 30 MIN: CPT | Performed by: PHYSICIAN ASSISTANT

## 2018-01-17 PROCEDURE — 87086 URINE CULTURE/COLONY COUNT: CPT

## 2018-01-17 PROCEDURE — 81002 URINALYSIS NONAUTO W/O SCOPE: CPT | Performed by: PHYSICIAN ASSISTANT

## 2018-01-17 PROCEDURE — 87186 SC STD MICRODIL/AGAR DIL: CPT

## 2018-01-17 PROCEDURE — 87077 CULTURE AEROBIC IDENTIFY: CPT

## 2018-01-17 RX ORDER — NITROFURANTOIN 25; 75 MG/1; MG/1
100 CAPSULE ORAL EVERY 12 HOURS
Qty: 10 CAP | Refills: 0 | Status: SHIPPED | OUTPATIENT
Start: 2018-01-17 | End: 2018-01-22

## 2018-01-17 RX ORDER — PHENAZOPYRIDINE HYDROCHLORIDE 200 MG/1
200 TABLET, FILM COATED ORAL 3 TIMES DAILY
Qty: 6 TAB | Refills: 0 | Status: SHIPPED | OUTPATIENT
Start: 2018-01-17 | End: 2018-01-19

## 2018-01-17 NOTE — PROGRESS NOTES
Chief Complaint   Patient presents with   • UTI       HISTORY OF PRESENT ILLNESS: Patient is a 47 y.o. female who presents today because she has a 2 day history of increased urinary urgency, frequency, dysuria. Denies any fevers, chills, has had mild nausea, no vomiting or diarrhea. She has not been taking any medications for her symptoms.    Patient Active Problem List    Diagnosis Date Noted   • Obesity (BMI 30-39.9) 01/17/2018   • Migraine without aura and without status migrainosus, not intractable 09/20/2017   • Lumbar degenerative disc disease 03/08/2017   • Neck pain 03/08/2017   • Iron deficiency anemia secondary to inadequate dietary iron intake 03/08/2017   • Status post hysterectomy 06/23/2016   • Slow transit constipation 06/23/2016   • S/P gastric bypass 12/21/2015   • Nutritional deficiency 12/21/2015   • Non morbid obesity due to excess calories 10/12/2015   • Low back pain 07/02/2014   • Obesity 07/02/2014       Allergies:Amoxicillin; Penicillins; Tetracycline; and Augmentin    Current Outpatient Prescriptions Ordered in [facility]   Medication Sig Dispense Refill   • phenazopyridine (PYRIDIUM) 200 MG Tab Take 1 Tab by mouth 3 times a day for 2 days. 6 Tab 0   • nitrofurantoin monohydr macro (MACROBID) 100 MG Cap Take 1 Cap by mouth every 12 hours for 5 days. 10 Cap 0   • clindamycin (CLEOCIN) 300 MG Cap Take 1 Cap by mouth 3 times a day. 30 Cap 0   • MethylPREDNISolone (MEDROL DOSEPAK) 4 MG Tablet Therapy Pack Use as directed 21 Tab 0   • hydrocortisone (ANUSOL-HC) 25 MG Suppos Insert 1 Suppository in rectum every 12 hours. 30 Suppository 0   • Misc. Devices Misc Total body iron transfusion per pharmacy protocol; patient has iron deficiency anemia; fax to infusion center 1 Device 0   • clotrimazole-betamethasone (LOTRISONE) 1-0.05 % Cream Apply  to affected area(s) 2 times a day.     • polyethylene glycol 3350 (MIRALAX) Powder Take 17 g by mouth 2 times a day. 1 Bottle 3   • Multiple Vitamins-Iron  "(MULTIVITAMIN/IRON PO) Take 1 Tab by mouth every day.     • Ferrous Gluconate (IRON) 240 (27 FE) MG Tab Take 1 Tab by mouth every day.     • Calcium-Vitamin D-Vitamin K (CALCIUM SOFT CHEWS PO) Take 1 Tab by mouth every day.     • Probiotic Product (PROBIOTIC DAILY) Cap Take 1 Cap by mouth every day.     • vitamin D, Ergocalciferol, (DRISDOL) 63205 UNITS Cap capsule Take 50,000 Units by mouth every 7 days.       No current [facility]-ordered facility-administered medications on file.        Past Medical History:   Diagnosis Date   • Anemia    • Anesthesia     nausea   • Arthritis     deg disc disease lower back   • Bowel habit changes     constipation   • Gynecological disorder     fibroids, irreg periods   • Hemorrhoids    • Obesity        Social History   Substance Use Topics   • Smoking status: Never Smoker   • Smokeless tobacco: Never Used   • Alcohol use No       Family Status   Relation Status   • Mother Alive   • Father Alive     Family History   Problem Relation Age of Onset   • Diabetes Father        ROS:  Review of Systems   Constitutional: Negative for fever, chills, weight loss and malaise/fatigue.   HENT: Negative for ear pain, nosebleeds, congestion, sore throat and neck pain.    Eyes: Negative for blurred vision.   Respiratory: Negative for cough, sputum production, shortness of breath and wheezing.    Cardiovascular: Negative for chest pain, palpitations, orthopnea and leg swelling.   Gastrointestinal: Negative for heartburn, positive for mild nausea, no vomiting and abdominal pain.   Genitourinary: Positive for dysuria, urgency and frequency.     Exam:  Blood pressure 118/78, pulse 80, temperature 36.7 °C (98.1 °F), resp. rate 16, height 1.626 m (5' 4\"), weight 97.1 kg (214 lb), SpO2 97 %.  General:  Well nourished, well developed female in NAD  Head:Normocephalic, atraumatic  Eyes: PERRLA, EOM within normal limits, no conjunctival injection, no scleral icterus, visual fields and acuity grossly " intact.  Nose: Symmetrical without tenderness, no discharge.  Mouth: reasonable hygiene, no erythema exudates or tonsillar enlargement.  Neck: no masses, range of motion within normal limits, no tracheal deviation. No obvious thyroid enlargement.  Pulmonary: chest is symmetrical with respiration, no wheezes, crackles, or rhonchi.  Cardiovascular: regular rate and rhythm without murmurs, rubs, or gallops.  Extremities: no clubbing, cyanosis, or edema.    . Urinalysis shows nitrites, leuks, protein, blood    Please note that this dictation was created using voice recognition software. I have made every reasonable attempt to correct obvious errors, but I expect that there are errors of grammar and possibly content that I did not discover before finalizing the note.    Assessment/Plan:  1. Obesity (BMI 30-39.9)  Patient identified as having weight management issue.  Appropriate orders and counseling given.   2. Acute cystitis with hematuria  Urine Culture    nitrofurantoin monohydr macro (MACROBID) 100 MG Cap   3. Dysuria  POCT Urinalysis    phenazopyridine (PYRIDIUM) 200 MG Tab   . Rest and fluids.    Followup with primary care in the next 7-10 days if not significantly improving, return to the urgent care or go to the emergency room sooner for any worsening of symptoms.

## 2018-01-18 DIAGNOSIS — N30.01 ACUTE CYSTITIS WITH HEMATURIA: ICD-10-CM

## 2018-01-20 LAB
BACTERIA UR CULT: ABNORMAL
BACTERIA UR CULT: ABNORMAL
SIGNIFICANT IND 70042: ABNORMAL
SITE SITE: ABNORMAL
SOURCE SOURCE: ABNORMAL

## 2018-01-22 ENCOUNTER — TELEPHONE (OUTPATIENT)
Dept: URGENT CARE | Facility: PHYSICIAN GROUP | Age: 48
End: 2018-01-22

## 2018-01-22 NOTE — TELEPHONE ENCOUNTER
----- Message from Gustavo Bhatia P.A.-C. sent at 1/20/2018  9:50 AM PST -----  Please notify the patient that the urine culture was positive for bacterial infection.  It is susceptible to the medication I prescribed.  Finish the antibiotic and follow up with PCP if symptoms persist.

## 2018-03-19 ENCOUNTER — TELEPHONE (OUTPATIENT)
Dept: MEDICAL GROUP | Facility: PHYSICIAN GROUP | Age: 48
End: 2018-03-19

## 2018-03-19 NOTE — TELEPHONE ENCOUNTER
We had to change her appointment to the end of April.  She wants to know if you can release her lab result so she can see them on My Chart.  Thanks.

## 2018-03-23 ENCOUNTER — HOSPITAL ENCOUNTER (OUTPATIENT)
Dept: RADIOLOGY | Facility: MEDICAL CENTER | Age: 48
End: 2018-03-23
Attending: FAMILY MEDICINE
Payer: COMMERCIAL

## 2018-03-23 DIAGNOSIS — Z12.31 SCREENING MAMMOGRAM, ENCOUNTER FOR: ICD-10-CM

## 2018-03-23 PROCEDURE — 77067 SCR MAMMO BI INCL CAD: CPT

## 2018-03-27 NOTE — PROGRESS NOTES
Jen  Your mammogram was normal! Next is due in one year.   Please let me know immediately if you notice any new lumps/rashes/pain/nipple discharge.  Lemuel Wolfe M.D.

## 2018-04-12 ENCOUNTER — OFFICE VISIT (OUTPATIENT)
Dept: MEDICAL GROUP | Facility: PHYSICIAN GROUP | Age: 48
End: 2018-04-12
Payer: COMMERCIAL

## 2018-04-12 VITALS
TEMPERATURE: 97.5 F | WEIGHT: 220 LBS | SYSTOLIC BLOOD PRESSURE: 120 MMHG | DIASTOLIC BLOOD PRESSURE: 70 MMHG | HEART RATE: 68 BPM | RESPIRATION RATE: 12 BRPM | BODY MASS INDEX: 37.56 KG/M2 | OXYGEN SATURATION: 97 % | HEIGHT: 64 IN

## 2018-04-12 DIAGNOSIS — D50.8 IRON DEFICIENCY ANEMIA SECONDARY TO INADEQUATE DIETARY IRON INTAKE: ICD-10-CM

## 2018-04-12 DIAGNOSIS — G89.29 CHRONIC RIGHT SHOULDER PAIN: ICD-10-CM

## 2018-04-12 DIAGNOSIS — G89.29 CHRONIC LEFT-SIDED LOW BACK PAIN WITH LEFT-SIDED SCIATICA: ICD-10-CM

## 2018-04-12 DIAGNOSIS — R68.89 FLU-LIKE SYMPTOMS: ICD-10-CM

## 2018-04-12 DIAGNOSIS — M54.2 NECK PAIN: ICD-10-CM

## 2018-04-12 DIAGNOSIS — M54.42 CHRONIC LEFT-SIDED LOW BACK PAIN WITH LEFT-SIDED SCIATICA: ICD-10-CM

## 2018-04-12 DIAGNOSIS — M25.511 CHRONIC RIGHT SHOULDER PAIN: ICD-10-CM

## 2018-04-12 LAB
FLUAV+FLUBV AG SPEC QL IA: NEGATIVE
INT CON NEG: NEGATIVE
INT CON POS: POSITIVE

## 2018-04-12 PROCEDURE — 87804 INFLUENZA ASSAY W/OPTIC: CPT | Performed by: FAMILY MEDICINE

## 2018-04-12 PROCEDURE — 99214 OFFICE O/P EST MOD 30 MIN: CPT | Performed by: FAMILY MEDICINE

## 2018-04-12 ASSESSMENT — PATIENT HEALTH QUESTIONNAIRE - PHQ9: CLINICAL INTERPRETATION OF PHQ2 SCORE: 0

## 2018-04-13 NOTE — ASSESSMENT & PLAN NOTE
[facility] showed mild degenerative disease.   She has gone to PT and is doing stretches, does not want to overdo pain medication.   Has been taking tylenol and muscle relaxant, cannot take NSAIDS due to gastric bypass. Pain is severe enough to wake her from sleep.   She is able to drive, work, walk. During long drives she has to stop every few hours to stretch.   Will refer to sports medicine.   Xray showed mid deg disc disease.

## 2018-04-13 NOTE — ASSESSMENT & PLAN NOTE
Neck pain and tightness with pain radiating to right shoulder. Does a lot of computer work. Also has headaches.   c spine xray was normal 2017

## 2018-04-13 NOTE — PROGRESS NOTES
Subjective:   Jen Mejía is a 47 y.o. female here today for evaluation and management of:     Flu-like symptoms  3 days of right sided throat pain, severe headache like migraines, body aches, fatigue,   No diarrhea or vomiting or fever but she feels feverish.   + sick contact at work  Missed getting flu shot due to scheduling conflicts.   Is able to eat and drink ok        Iron deficiency anemia secondary to inadequate dietary iron intake  Chronic iron deficiency. Controlled with iron supplement.   Had had iron infusion in the past.   Had a hysterectomy due to heavy periods.   June 2016 had the hyst and then august last iron infusion. Since then iron levels and H/H normal. Ferritin low normal   Will recheck labs.       Low back pain  Chronic, right hip pain to [facility], xray showed mild degenerative disease.   She has gone to PT and is doing stretches, does not want to overdo pain medication.   Has been taking tylenol and muscle relaxant, cannot take NSAIDS due to gastric bypass. Pain is severe enough to wake her from sleep.   She is able to drive, work, walk. During long drives she has to stop every few hours to stretch.   Will refer to sports medicine.   Xray showed mid deg disc disease.     Neck pain  Neck pain and tightness with pain radiating to right shoulder. Does a lot of computer work. Also has headaches.   c spine xray was normal 2017    Chronic right shoulder pain  Fell once at work, since then has pain in her right shoulder.   Reports ct scan showed bursitis.            Current medicines (including changes today)  Current Outpatient Prescriptions   Medication Sig Dispense Refill   • Multiple Vitamins-Iron (MULTIVITAMIN/IRON PO) Take 1 Tab by mouth every day.     • Ferrous Gluconate (IRON) 240 (27 FE) MG Tab Take 1 Tab by mouth every day.     • Calcium-Vitamin D-Vitamin K (CALCIUM SOFT CHEWS PO) Take 1 Tab by mouth every day.     • Probiotic Product (PROBIOTIC DAILY) Cap Take 1 Cap by mouth every  "day.     • clindamycin (CLEOCIN) 300 MG Cap Take 1 Cap by mouth 3 times a day. 30 Cap 0   • Misc. Devices Misc Total body iron transfusion per pharmacy protocol; patient has iron deficiency anemia; fax to infusion center 1 Device 0   • clotrimazole-betamethasone (LOTRISONE) 1-0.05 % Cream Apply  to affected area(s) 2 times a day.     • polyethylene glycol 3350 (MIRALAX) Powder Take 17 g by mouth 2 times a day. 1 Bottle 3     No current facility-administered medications for this visit.      She  has a past medical history of Anemia; Anesthesia; Arthritis; Bowel habit changes; Gynecological disorder; Hemorrhoids; and Obesity.    ROS  No chest pain, no shortness of breath, no abdominal pain       Objective:     Blood pressure 120/70, pulse 68, temperature 36.4 °C (97.5 °F), resp. rate 12, height 1.626 m (5' 4\"), weight 99.8 kg (220 lb), SpO2 97 %. Body mass index is 37.76 kg/m².   Physical Exam:  Constitutional: Alert, no distress.  Skin: Warm, dry, good turgor, no rashes in visible areas.  Eye: Equal, round and reactive, conjunctiva clear, lids normal.  ENMT: Lips without lesions, good dentition, oropharynx clear.  Neck: Trachea midline, no masses, no thyromegaly. No cervical or supraclavicular lymphadenopathy  Respiratory: Unlabored respiratory effort, lungs clear to auscultation, no wheezes, no ronchi.  Cardiovascular: Normal S1, S2, no murmur, no edema.  Abdomen: Soft, non-tender, no masses, no hepatosplenomegaly.  Psych: Alert and oriented x3, normal affect and mood.        Assessment and Plan:   The following treatment plan was discussed    1. Flu-like symptoms  Check rapid flu: negative in clinic.     2. Iron deficiency anemia secondary to inadequate dietary iron intake  Continue iron supplements  Recheck iron and ferritin.     3. Neck, low back and right shoulder pain  Encouraged standing desk at work, back exercises, stretches, ice, tylenol  Refer to sports medicine.     Followup: Return in about 6 months " (around 10/12/2018) for neck, back, shoulder pain, anemia.

## 2018-04-13 NOTE — ASSESSMENT & PLAN NOTE
Chronic iron deficiency. Controlled with iron supplement.   Had had iron infusion in the past.   Had a hysterectomy due to heavy periods.   June 2016 had the hyst and then august last iron infusion. Since then iron levels and H/H normal. Ferritin low normal   Will recheck labs.

## 2018-04-13 NOTE — ASSESSMENT & PLAN NOTE
3 days of right sided throat pain, severe headache like migraines, body aches, fatigue,   No diarrhea or vomiting or fever but she feels feverish.   + sick contact at work  Missed getting flu shot due to scheduling conflicts.   Is able to eat and drink ok

## 2018-04-13 NOTE — ASSESSMENT & PLAN NOTE
Fell once at work, since then has pain in her right shoulder.   Reports ct scan showed bursitis.

## 2018-04-20 ENCOUNTER — OFFICE VISIT (OUTPATIENT)
Dept: MEDICAL GROUP | Facility: CLINIC | Age: 48
End: 2018-04-20
Payer: COMMERCIAL

## 2018-04-20 VITALS
HEART RATE: 76 BPM | HEIGHT: 64 IN | TEMPERATURE: 98.2 F | RESPIRATION RATE: 16 BRPM | SYSTOLIC BLOOD PRESSURE: 122 MMHG | DIASTOLIC BLOOD PRESSURE: 76 MMHG | WEIGHT: 220 LBS | BODY MASS INDEX: 37.56 KG/M2 | OXYGEN SATURATION: 97 %

## 2018-04-20 DIAGNOSIS — M53.3 CHRONIC RIGHT SACROILIAC JOINT PAIN: ICD-10-CM

## 2018-04-20 DIAGNOSIS — E66.9 OBESITY (BMI 35.0-39.9 WITHOUT COMORBIDITY): ICD-10-CM

## 2018-04-20 DIAGNOSIS — G89.29 CHRONIC RIGHT-SIDED LOW BACK PAIN WITHOUT SCIATICA: ICD-10-CM

## 2018-04-20 DIAGNOSIS — G89.29 CHRONIC RIGHT SACROILIAC JOINT PAIN: ICD-10-CM

## 2018-04-20 DIAGNOSIS — M21.70 LEG LENGTH DISCREPANCY: ICD-10-CM

## 2018-04-20 DIAGNOSIS — M17.11 TRICOMPARTMENT OSTEOARTHRITIS OF RIGHT KNEE: ICD-10-CM

## 2018-04-20 DIAGNOSIS — M54.50 CHRONIC RIGHT-SIDED LOW BACK PAIN WITHOUT SCIATICA: ICD-10-CM

## 2018-04-20 PROCEDURE — 99203 OFFICE O/P NEW LOW 30 MIN: CPT | Performed by: FAMILY MEDICINE

## 2018-04-20 ASSESSMENT — ENCOUNTER SYMPTOMS
HEADACHES: 1
VOMITING: 0
CHILLS: 0
FEVER: 0
DIZZINESS: 0
NAUSEA: 0
SHORTNESS OF BREATH: 0

## 2018-04-21 NOTE — PROGRESS NOTES
"Subjective:      Jen Mejía is a 47 y.o. female who presents with Back Pain (Referral from PCP/ Low back pain ); Shoulder Pain (R shoulder pain ); Neck Pain (Neck pain ); and Knee Pain (L knee pain )      Referred by Lemuel Wolfe MD for evaluation of \"Old knee injury, Lumbar DDD, Cervical DDD\"    HPI   Work comp claim, discharged, fall on shoulder at work    LEFT knee pain  Pain for 1 yr, worsening with activity  Sharp with stepping, constant throbbing otherwise, magdalena-MEDIAL knee  Improved with ice, heat, Epson salts, some topicals, tylenol without improvment  ACL reconstruction 20+ yrs ago  Swelling with and after activity  Occasional buckling, but no locking  X-rays L knee back in 2007  Prior CO for her spine and knee, no prior injections, e-stim and u/s help some   POSITIVE night symptoms  She has had x-rays for the RIGHT knee    RIGHT Low back pain, Sacral pain, RIGHT sacrum/hip region  Since 1997, after her 1st daughter  Constant ache  No radiation, but RIGHT hip hurts as well  Minimal improvement with ambulation  Worse with sitting long periods (car/plane)  Prior CO for her spine  POSITIVE night symptoms  No bowel or bladder issues    Physical Activity, was doing A[employer] and was doing well, but out 2 months due to shoulder pain, yoga  Treadmill leads to LEFT hip swelling, cycle leads to tailbone pain (about 3 x per week)    Medications  L shorter than R leg?    Review of Systems   Constitutional: Negative for chills and fever.   Respiratory: Negative for shortness of breath.    Cardiovascular: Negative for chest pain.   Gastrointestinal: Negative for nausea and vomiting.   Neurological: Positive for headaches. Negative for dizziness.        Migraine history          Objective:     /76   Pulse 76   Temp 36.8 °C (98.2 °F)   Resp 16   Ht 1.626 m (5' 4\")   Wt 99.8 kg (220 lb)   SpO2 97%   BMI 37.76 kg/m²       Physical Exam      Lumbar spine exam:  No acute distress  Able to walk on " heels and toes  Able to flex to 90° with MILD discomfort  Extension and lateral rotation with MILD discomfort  Strength testing with hip flexion, knee flexion and extension, ankle dorsiflexion and plantarflexion, and EHL testing were 5 out of 5 bilaterally  Sensation was intact bilaterally  The legs were otherwise neurovascularly intact    Knee exam:    LEFT KNEE:  Normal alignment  POSITIVE effusion  Range of motion limited to flexion and 90°  Anterior knee nontender  Negative apprehension  POSITIVE joint line tenderness medially AND laterally  The legs otherwise neurovascularly intact     Assessment/Plan:     1. Chronic right sacroiliac joint pain     2. Chronic right-sided low back pain without sciatica     3. Leg length discrepancy      LEFT is 1 CM shorter than her right   4. Tricompartment osteoarthritis of right knee     5. Obesity (BMI 35.0-39.9 without comorbidity)       Patient was fitted in the office today with a sacroiliac belt which SIGNIFICANTLY improved her symptoms    Recommend swimming and cycling  She should try an obtain a more comfortable seat for her bicycle so she can tolerate riding for longer periods    Recommend physical activity 10 minutes per day as opposed to 3 times per week        3/8/2017 6:26 PM    HISTORY/REASON FOR EXAM:  Chronic low back pain.      TECHNIQUE/ EXAM DESCRIPTION AND NUMBER OF VIEWS:  3 views of the lumbar spine.    COMPARISON: 7/3/2014    FINDINGS:  The alignment of the lumbar spine is normal.  The vertebral body heights are maintained.    Minimal disc space narrowing at L5-S1 is stable.  There is no significant facet arthropathy.  There is no acute abnormality.  The SI joints appear normal.   Impression       1.  No acute lumbar spine fracture or subluxation.    2.  Stable minimal degenerative change at L5-S1.     Interpreted in the office today with the patient    EXAMINATION: 9DX  7305 KNEE COMPLETE 4+  LEFT  [id number] O  69871 CPT-4:   59176    HISTORY/REASON FOR  EXAM:     Left knee pain after injury.      TECHNIQUE/EXAM DESCRIPTION AND NUMBER OF VIEWS:     Four views of the   left knee were obtained 08/30/07.      COMPARISON:    None available.     FINDINGS:     Four views of the left knee demonstrate proximal tibial and   distal femoral screws consistent with prior anterior cruciate ligament   reconstruction.  There is a well-circumscribed defect within the anterior   aspect of the patella probably related to previous surgery or less likely   previous trauma.  Moderate tricompartmental joint space narrowing,   subchondral sclerosis and osteophyte formation.  No effusion noted.    There are no fractures.     Impression     IMPRESSION:    POSTOPERATIVE CHANGES AND TRICOMPARTMENTAL OSTEOARTHRITIS.  OTHERWISE   UNREMARKABLE FOUR VIEWS OF THE LEFT KNEE.       Knee films not accessible today     Thank you Lemuel Wolfe MD for allowing me to participate in caring for your patient.

## 2018-05-15 ENCOUNTER — APPOINTMENT (OUTPATIENT)
Dept: RADIOLOGY | Facility: IMAGING CENTER | Age: 48
End: 2018-05-15
Attending: PHYSICIAN ASSISTANT
Payer: COMMERCIAL

## 2018-05-15 ENCOUNTER — OFFICE VISIT (OUTPATIENT)
Dept: URGENT CARE | Facility: PHYSICIAN GROUP | Age: 48
End: 2018-05-15
Payer: COMMERCIAL

## 2018-05-15 VITALS
WEIGHT: 216 LBS | OXYGEN SATURATION: 97 % | BODY MASS INDEX: 36.88 KG/M2 | HEIGHT: 64 IN | DIASTOLIC BLOOD PRESSURE: 82 MMHG | SYSTOLIC BLOOD PRESSURE: 120 MMHG | HEART RATE: 86 BPM | TEMPERATURE: 99.1 F | RESPIRATION RATE: 16 BRPM

## 2018-05-15 DIAGNOSIS — M25.551 RIGHT HIP PAIN: ICD-10-CM

## 2018-05-15 PROCEDURE — 99214 OFFICE O/P EST MOD 30 MIN: CPT | Performed by: PHYSICIAN ASSISTANT

## 2018-05-15 PROCEDURE — 73501 X-RAY EXAM HIP UNI 1 VIEW: CPT | Mod: TC,FY,RT | Performed by: PHYSICIAN ASSISTANT

## 2018-05-15 NOTE — PROGRESS NOTES
Chief Complaint   Patient presents with   • Hip Pain     Right;       HISTORY OF PRESENT ILLNESS: Patient is a 47 y.o. female who presents today for the following:    Patient comes in for evaluation of right hip pain. This is something she has had somewhat chronically but has progressively worsened over the last month. She has been having issues with the right SI joint and has seen Dr. Dubois for this. She was given the sacroiliac belt which seemed to help with the SI joint pain but seemed to worsen the right hip pain. She denies radiating pain down the leg. She complains of pain on the anterior aspect of the right hip seems to go straight through to the right SI joint. She has worsening pain with abduction of the right hip. She has a difficult time sleeping stating that wakes her up multiple times through the night. She has tried heat, ice, deep blue, Biofreeze, and physical therapy.  She states everything helps for a little while but continues to have significant pain during the day and night. She has a follow-up appointment with Dr. Dubois soon.    Patient Active Problem List    Diagnosis Date Noted   • Flu-like symptoms 04/12/2018   • Chronic right shoulder pain 04/12/2018   • Obesity (BMI 30-39.9) 01/17/2018   • Migraine without aura and without status migrainosus, not intractable 09/20/2017   • Lumbar degenerative disc disease 03/08/2017   • Neck pain 03/08/2017   • Iron deficiency anemia secondary to inadequate dietary iron intake 03/08/2017   • Status post hysterectomy 06/23/2016   • Slow transit constipation 06/23/2016   • S/P gastric bypass 12/21/2015   • Nutritional deficiency 12/21/2015   • Non morbid obesity due to excess calories 10/12/2015   • Low back pain 07/02/2014   • Obesity 07/02/2014       Allergies:Amoxicillin; Penicillins; Tetracycline; and Augmentin    Current Outpatient Prescriptions Ordered in [facility]   Medication Sig Dispense Refill   • clindamycin (CLEOCIN) 300 MG Cap Take 1 Cap by mouth  3 times a day. 30 Cap 0   • Misc. Devices Misc Total body iron transfusion per pharmacy protocol; patient has iron deficiency anemia; fax to infusion center 1 Device 0   • clotrimazole-betamethasone (LOTRISONE) 1-0.05 % Cream Apply  to affected area(s) 2 times a day.     • polyethylene glycol 3350 (MIRALAX) Powder Take 17 g by mouth 2 times a day. 1 Bottle 3   • Multiple Vitamins-Iron (MULTIVITAMIN/IRON PO) Take 1 Tab by mouth every day.     • Ferrous Gluconate (IRON) 240 (27 FE) MG Tab Take 1 Tab by mouth every day.     • Calcium-Vitamin D-Vitamin K (CALCIUM SOFT CHEWS PO) Take 1 Tab by mouth every day.     • Probiotic Product (PROBIOTIC DAILY) Cap Take 1 Cap by mouth every day.       No current [facility]-ordered facility-administered medications on file.        Past Medical History:   Diagnosis Date   • Anemia    • Anesthesia     nausea   • Arthritis     deg disc disease lower back   • Bowel habit changes     constipation   • Gynecological disorder     fibroids, irreg periods   • Hemorrhoids    • Obesity        Social History   Substance Use Topics   • Smoking status: Never Smoker   • Smokeless tobacco: Never Used   • Alcohol use No       Family Status   Relation Status   • Mother Alive   • Father Alive     Family History   Problem Relation Age of Onset   • Diabetes Father        Review of Systems:    Constitutional ROS: No unexpected change in weight, No weakness, No fatigue  Eye ROS: No recent significant change in vision, No eye pain, redness, discharge  Ear ROS: No drainage, No tinnitus or vertigo, No recent change in hearing  Mouth/Throat ROS: No teeth or gum problems, No bleeding gums, No tongue complaints  Neck ROS: No swollen glands, No significant pain in neck  Pulmonary ROS: No chronic cough, sputum, or hemoptysis, No dyspnea on exertion, No wheezing  Cardiovascular ROS: No diaphoresis, No edema, No palpitations  Gastrointestinal ROS: No change in bowel habits, No significant change in appetite, No nausea,  "vomiting, diarrhea, or constipation  Hematologic/Lymphatic ROS: No chills, No night sweats, No weight loss  Skin/Integumentary ROS: No edema, No evidence of rash, No itching      Exam:  Blood pressure 120/82, pulse 86, temperature 37.3 °C (99.1 °F), resp. rate 16, height 1.626 m (5' 4\"), weight 98 kg (216 lb), SpO2 97 %.  General: Well developed, well nourished. No distress.  Eye: PERRL/EOMI; conjunctivae clear, lids normal.  ENMT: Head is grossly normal.  Extremities: Tenderness noted over the sacrum, primarily to the right.  Pulmonary: Unlabored respiratory effort.    Cardiovascular: Pedal pulses are strong and equal bilaterally.  Extremities: Full range of motion right hip. Pain with abduction. No localized tenderness noted.  Skin: Warm, dry, good turgor. No rashes in visible areas.   Psych: Normal mood. Alert and oriented x3. Judgment and insight is normal.    Right hip x-ray, per radiology:  Impression       1.  No radiographic evidence of acute traumatic injury.    2.  Findings are consistent with mild osteoarthritis.         Assessment/Plan:  Discussed acetaminophen dosing as needed for pain. Continue all other modalities for additional pain relief. Follow up with Dr. Dubois 5/21 as scheduled.  1. Right hip pain  DX-HIP-UNILATERAL-WITH PELVIS-1 VIEW RIGHT       "

## 2018-05-21 ENCOUNTER — OFFICE VISIT (OUTPATIENT)
Dept: MEDICAL GROUP | Facility: CLINIC | Age: 48
End: 2018-05-21
Payer: COMMERCIAL

## 2018-05-21 VITALS
RESPIRATION RATE: 18 BRPM | DIASTOLIC BLOOD PRESSURE: 78 MMHG | SYSTOLIC BLOOD PRESSURE: 118 MMHG | OXYGEN SATURATION: 97 % | TEMPERATURE: 97.9 F | HEART RATE: 100 BPM | WEIGHT: 216 LBS | BODY MASS INDEX: 36.88 KG/M2 | HEIGHT: 64 IN

## 2018-05-21 DIAGNOSIS — M53.3 SACROILIAC JOINT DYSFUNCTION OF RIGHT SIDE: ICD-10-CM

## 2018-05-21 DIAGNOSIS — M54.50 CHRONIC RIGHT-SIDED LOW BACK PAIN WITHOUT SCIATICA: ICD-10-CM

## 2018-05-21 DIAGNOSIS — M17.12 TRICOMPARTMENT OSTEOARTHRITIS OF LEFT KNEE: ICD-10-CM

## 2018-05-21 DIAGNOSIS — G89.29 CHRONIC RIGHT-SIDED LOW BACK PAIN WITHOUT SCIATICA: ICD-10-CM

## 2018-05-21 PROCEDURE — 99214 OFFICE O/P EST MOD 30 MIN: CPT | Performed by: FAMILY MEDICINE

## 2018-05-21 ASSESSMENT — ENCOUNTER SYMPTOMS
HIP PAIN: 1
DIZZINESS: 0
SHORTNESS OF BREATH: 0
FEVER: 0
NAUSEA: 0
VOMITING: 0
CHILLS: 0
HEADACHES: 1

## 2018-05-21 NOTE — PROGRESS NOTES
"Subjective:      Jen Mejía is a 47 y.o. female who presents with Hip Pain (F/V R hip pain)        Hip Pain    Associated symptoms include headaches. Pertinent negatives include no chest pain, chills, fever, nausea or vomiting.      Seen at  for right hip pain.   Deep in the hip shooting to the tailbone.  No radiation down the leg, but occasional tightness in the RIGHT hamstring region.  Sacroiliac belt may have agitated her right hip pain.   No pain in the groin.  Pain is worse with sitting and at night/sleeping  Improved with swimming and Epson salt baths  Biofreeze helps minimally, and she HAS NOT done physical therapy for the hip    LEFT knee pain is mildly improved  magdalena-MEDIAL knee  ACL reconstruction 20+ yrs ago  Swelling with and after activity  X-rays L knee back in 2007    No bowel or bladder issues, but she does have urgency on occasion    Physical Activity, was doing [employer] and was doing well, but out 2 months due to shoulder pain, yoga  Treadmill leads to LEFT hip swelling, cycle leads to tailbone pain (about 3 x per week)    Medications  L shorter than R leg?    Review of Systems   Constitutional: Negative for chills and fever.   Respiratory: Negative for shortness of breath.    Cardiovascular: Negative for chest pain.   Gastrointestinal: Negative for nausea and vomiting.   Neurological: Positive for headaches. Negative for dizziness.        Migraine history          Objective:     /78   Pulse 100   Temp 36.6 °C (97.9 °F)   Resp 18   Ht 1.626 m (5' 4\")   Wt 98 kg (216 lb)   SpO2 97%   BMI 37.08 kg/m²       Physical Exam      Lumbar spine exam:  No acute distress  Extension and lateral rotation with MILD discomfort  TENDER RIGHT SI joint    NO hip pain with hip internal rotation Bilaterally    Knee exam:  LEFT KNEE:  Normal alignment  POSITIVE effusion  Range of motion limited to flexion and 90° and difficulty extending fully     Assessment/Plan:     1. Sacroiliac joint " dysfunction of right side  REFERRAL TO PHYSICAL THERAPY Reason for Therapy: Eval/Treat/Report    REFERRAL TO PHYSIATRY (PMR)   2. Chronic right-sided low back pain without sciatica  REFERRAL TO PHYSIATRY (PMR)   3. Tricompartment osteoarthritis of left knee        Continue sacroiliac belt which SIGNIFICANTLY improved her symptoms  Demonstrated SI joint reduction techniques and strengthening exercises    Recommend continuing swimming since it helps  She should try an obtain a more comfortable seat for her bicycle so she can tolerate riding for longer periods    Recommend physical activity 10 minutes per day as opposed to 3 times per week          5/15/2018 1:46 PM    HISTORY/REASON FOR EXAM:  Right hip pain.      TECHNIQUE/EXAM DESCRIPTION AND NUMBER OF VIEWS:  2 views of the RIGHT hip.    COMPARISON: None    FINDINGS:  There is no evidence of acute fracture involving the pelvis. No proximal femoral fracture is identified.  There is no evidence of femoral head flattening. No femoral head deformity is identified. No bone erosions are appreciated.  There is mild hip joint space narrowing. There is mild marginal spurring.   Impression       1.  No radiographic evidence of acute traumatic injury.    2.  Findings are consistent with mild osteoarthritis.     Interpreted in the office today with the patient    3/8/2017 6:26 PM    HISTORY/REASON FOR EXAM:  Chronic low back pain.      TECHNIQUE/ EXAM DESCRIPTION AND NUMBER OF VIEWS:  3 views of the lumbar spine.    COMPARISON: 7/3/2014    FINDINGS:  The alignment of the lumbar spine is normal.  The vertebral body heights are maintained.    Minimal disc space narrowing at L5-S1 is stable.  There is no significant facet arthropathy.  There is no acute abnormality.  The SI joints appear normal.   Impression       1.  No acute lumbar spine fracture or subluxation.    2.  Stable minimal degenerative change at L5-S1.     Interpreted in the office today with the  patient    EXAMINATION: 9DX  7305 KNEE COMPLETE 4+  LEFT  [id number] O  [id number] CPT-4:   17835    HISTORY/REASON FOR EXAM:     Left knee pain after injury.      TECHNIQUE/EXAM DESCRIPTION AND NUMBER OF VIEWS:     Four views of the   left knee were obtained 08/30/07.      COMPARISON:    None available.     FINDINGS:     Four views of the left knee demonstrate proximal tibial and   distal femoral screws consistent with prior anterior cruciate ligament   reconstruction.  There is a well-circumscribed defect within the anterior   aspect of the patella probably related to previous surgery or less likely   previous trauma.  Moderate tricompartmental joint space narrowing,   subchondral sclerosis and osteophyte formation.  No effusion noted.    There are no fractures.     Impression     IMPRESSION:    POSTOPERATIVE CHANGES AND TRICOMPARTMENTAL OSTEOARTHRITIS.  OTHERWISE   UNREMARKABLE FOUR VIEWS OF THE LEFT KNEE.       Thank you Lemuel Wolfe MD for allowing me to participate in caring for your patient.

## 2018-06-04 ENCOUNTER — OFFICE VISIT (OUTPATIENT)
Dept: MEDICAL GROUP | Facility: PHYSICIAN GROUP | Age: 48
End: 2018-06-04
Payer: COMMERCIAL

## 2018-06-04 VITALS
HEART RATE: 94 BPM | BODY MASS INDEX: 37.05 KG/M2 | TEMPERATURE: 97.8 F | SYSTOLIC BLOOD PRESSURE: 118 MMHG | RESPIRATION RATE: 16 BRPM | OXYGEN SATURATION: 95 % | DIASTOLIC BLOOD PRESSURE: 72 MMHG | WEIGHT: 217 LBS | HEIGHT: 64 IN

## 2018-06-04 DIAGNOSIS — H91.93 DECREASED HEARING OF BOTH EARS: ICD-10-CM

## 2018-06-04 PROCEDURE — 99213 OFFICE O/P EST LOW 20 MIN: CPT | Performed by: NURSE PRACTITIONER

## 2018-06-04 NOTE — PROGRESS NOTES
Chief Complaint   Patient presents with   • Ear Fullness     bilat ear x 6 months         This is a 47 y.o.female patient that presents today with the following: decreased hearing    Decreased hearing of both ears  Pt has noticed over the past 6 months her hearing has gotten progressively worse. She denies ear pain, but has had a couple of ear infections in the last year. She does not describe a full or muffling sensation. She has tried OTC wax removal medication.      No visits with results within 1 Month(s) from this visit.   Latest known visit with results is:   Office Visit on 04/12/2018   Component Date Value   • Rapid Influenza A-B 04/12/2018 Negative    • Internal Control Positive 04/12/2018 Positive    • Internal Control Negative 04/12/2018 Negative          clinical course has been stable    Past Medical History:   Diagnosis Date   • Anemia    • Anesthesia     nausea   • Arthritis     deg disc disease lower back   • Bowel habit changes     constipation   • Gynecological disorder     fibroids, irreg periods   • Hemorrhoids    • Obesity        Past Surgical History:   Procedure Laterality Date   • VAGINAL HYSTERECTOMY SCOPE TOTAL  6/9/2016    Procedure: VAGINAL HYSTERECTOMY SCOPE TOTAL, BILATERAL SALPINGECTOMY;  Surgeon: Hue Jeong M.D.;  Location: SURGERY SAME DAY [facility];  Service:    • CYSTOSCOPY N/A 6/9/2016    Procedure: CYSTOSCOPY;  Surgeon: Hue Jeong M.D.;  Location: SURGERY SAME DAY [facility];  Service:    • GASTRIC BYPASS LAPAROSCOPIC  10/12/2015    Procedure: GASTRIC BYPASS LAPAROSCOPIC SUSHIL EN Y;  Surgeon: John H Ganser, M.D.;  Location: SURGERY [facility];  Service:    • ACL RECONSTRUCTION     • CHOLECYSTECTOMY     • KNEE ARTHROSCOPY      multiple   • TONSILLECTOMY     • US-NEEDLE CORE BX-BREAST PANEL         Family History   Problem Relation Age of Onset   • Diabetes Father        Amoxicillin; Penicillins; Tetracycline; and Augmentin    Current Outpatient Prescriptions  "Ordered in [facility]   Medication Sig Dispense Refill   • clotrimazole-betamethasone (LOTRISONE) 1-0.05 % Cream Apply  to affected area(s) 2 times a day.     • polyethylene glycol 3350 (MIRALAX) Powder Take 17 g by mouth 2 times a day. 1 Bottle 3   • Multiple Vitamins-Iron (MULTIVITAMIN/IRON PO) Take 1 Tab by mouth every day.     • Ferrous Gluconate (IRON) 240 (27 FE) MG Tab Take 1 Tab by mouth every day.     • Calcium-Vitamin D-Vitamin K (CALCIUM SOFT CHEWS PO) Take 1 Tab by mouth every day.     • Probiotic Product (PROBIOTIC DAILY) Cap Take 1 Cap by mouth every day.     • clindamycin (CLEOCIN) 300 MG Cap Take 1 Cap by mouth 3 times a day. 30 Cap 0   • Misc. Devices Misc Total body iron transfusion per pharmacy protocol; patient has iron deficiency anemia; fax to infusion center 1 Device 0     No current Epic-ordered facility-administered medications on file.        Constitutional ROS: No unexpected change in weight, No weakness, No unexplained fevers, sweats, or chills  Ear ROS: positive per HPI  Pulmonary ROS: No chronic cough, sputum, no symptoms of allergic rhinitis  Cardiovascular ROS: No chest pain, No edema, No syncope  Musculoskeletal/Extremities ROS: No clubbing, No peripheral edema, No pain, redness or swelling on the joints  Neurologic ROS: Normal development, No seizures, No weakness, no dizziness    Physical exam:  /72   Pulse 94   Temp 36.6 °C (97.8 °F)   Resp 16   Ht 1.626 m (5' 4\")   Wt 98.4 kg (217 lb)   SpO2 95%   BMI 37.25 kg/m²   General Appearance: middle aged female, alert, no distress, obese, well groomed  Skin: Skin color, texture, turgor normal. No rashes or lesions.  Ears: positive findings: bilateral TMs - mildly retracted, but no signs of infection   Lungs: negative findings: normal respiratory rate and rhythm, normal efforts  Abdomen: Abdomen soft, non-tender. BS normal. No masses,  No organomegaly  Musculoskeletal: negative findings: no evidence of joint instability, strength " normal, no deformities present  Neurologic: intact    Medical decision making/discussion: will refer to audiology for further evaluation of hearing loss. She is to keep upcoming appt with PCP    Jen was seen today for ear fullness.    Diagnoses and all orders for this visit:    Decreased hearing of both ears  -     REFERRAL TO AUDIOLOGY          Please note that this dictation was created using voice recognition software. I have made every reasonable attempt to correct obvious errors, but I expect that there are errors of grammar and possibly content that I did not discover before finalizing the note.

## 2018-06-04 NOTE — ASSESSMENT & PLAN NOTE
Pt has noticed over the past 6 months her hearing has gotten progressively worse. She denies ear pain, but has had a couple of ear infections in the last year. She does not describe a full or muffling sensation. She has tried OTC wax removal medication.

## 2018-06-07 ENCOUNTER — OFFICE VISIT (OUTPATIENT)
Dept: PHYSICAL MEDICINE AND REHAB | Facility: MEDICAL CENTER | Age: 48
End: 2018-06-07
Payer: COMMERCIAL

## 2018-06-07 VITALS
HEART RATE: 73 BPM | TEMPERATURE: 98.7 F | OXYGEN SATURATION: 96 % | SYSTOLIC BLOOD PRESSURE: 116 MMHG | DIASTOLIC BLOOD PRESSURE: 60 MMHG | HEIGHT: 64 IN | BODY MASS INDEX: 37.56 KG/M2 | WEIGHT: 220 LBS

## 2018-06-07 DIAGNOSIS — G89.29 CHRONIC RIGHT SI JOINT PAIN: ICD-10-CM

## 2018-06-07 DIAGNOSIS — G89.29 CHRONIC RIGHT-SIDED LOW BACK PAIN, WITH SCIATICA PRESENCE UNSPECIFIED: ICD-10-CM

## 2018-06-07 DIAGNOSIS — M53.3 CHRONIC RIGHT SI JOINT PAIN: ICD-10-CM

## 2018-06-07 DIAGNOSIS — Z98.84 H/O GASTRIC BYPASS: ICD-10-CM

## 2018-06-07 DIAGNOSIS — E66.9 OBESITY (BMI 30-39.9): ICD-10-CM

## 2018-06-07 DIAGNOSIS — Z98.890 S/P ACL RECONSTRUCTION: ICD-10-CM

## 2018-06-07 DIAGNOSIS — M54.5 CHRONIC RIGHT-SIDED LOW BACK PAIN, WITH SCIATICA PRESENCE UNSPECIFIED: ICD-10-CM

## 2018-06-07 DIAGNOSIS — M17.12 ARTHRITIS OF LEFT KNEE: ICD-10-CM

## 2018-06-07 PROCEDURE — 99205 OFFICE O/P NEW HI 60 MIN: CPT | Performed by: PHYSICAL MEDICINE & REHABILITATION

## 2018-06-07 ASSESSMENT — PAIN SCALES - GENERAL: PAINLEVEL: 7=MODERATE-SEVERE PAIN

## 2018-06-07 NOTE — PROGRESS NOTES
New patient note    Physiatry (physical medicine and  Rehabilitation), interventional spine and sports medicine, Pain medicine    Date of Service: 6/7/2018    Chief complaint: right low back and hip pain    HISTORY    HPI: Jen Mejía 47 y.o. female who presents today with the following complaints.    Right-sided low back pain radiating to the right hip 7-8/10 intensity, aching in quality, constant with intermittent flares.  This is been present for the past year and has been worsening.    History of left knee ACL reconstruction approximately 25 years ago.  Left knee pain has been worsening over the past year.  Aching in quality.  Nonradiating.  4-5/10.    The patient is doing combination cross training and aerobic activity however this may have exacerbated her symptoms.    Right shoulder pain is a Workmen's Comp. case.    History of gastric bypass.        Medical records review:  I reviewed the note from the referring provider Marcelo Dubois M.D. dated 5/21/2018 and 4/20/2018.  X-rays reviewed.  The patient was referred to physiatry.  Diagnosis is sacroiliac pain.    Previous treatments:    Physical Therapy: Yes, however the patient had to stop therapy because of the pain.    Medications the patient is tried: Tylenol.  The patient cannot take NSAIDs because of gastric bypass.  The patient is not interested in taking narcotics.    Previous interventions: none    Previous surgeries to relieve the above pain: None for the low back      ROS:   Red Flags ROS:   Fever, Chills, Sweats: Denies  Involuntary Weight Loss: Denies  Bladder Incontinence: Denies  Bowel Incontinence: denies  Saddle Anesthesia: Denies    All other systems reviewed and negative.       PMHx:   Past Medical History:   Diagnosis Date   • Anemia    • Anesthesia     nausea   • Arthritis     deg disc disease lower back   • Bowel habit changes     constipation   • Gynecological disorder     fibroids, irreg periods   • Hemorrhoids    • Obesity         PSHx:   Past Surgical History:   Procedure Laterality Date   • VAGINAL HYSTERECTOMY SCOPE TOTAL  6/9/2016    Procedure: VAGINAL HYSTERECTOMY SCOPE TOTAL, BILATERAL SALPINGECTOMY;  Surgeon: Hue Jeong M.D.;  Location: SURGERY SAME DAY [facility];  Service:    • CYSTOSCOPY N/A 6/9/2016    Procedure: CYSTOSCOPY;  Surgeon: Hue Jeong M.D.;  Location: SURGERY SAME DAY [facility];  Service:    • GASTRIC BYPASS LAPAROSCOPIC  10/12/2015    Procedure: GASTRIC BYPASS LAPAROSCOPIC SUSHIL EN Y;  Surgeon: John H Ganser, M.D.;  Location: SURGERY [facility];  Service:    • ACL RECONSTRUCTION     • CHOLECYSTECTOMY     • KNEE ARTHROSCOPY      multiple   • TONSILLECTOMY     • US-NEEDLE CORE BX-BREAST PANEL         Family history   Family History   Problem Relation Age of Onset   • Diabetes Father          Medications:   Current Outpatient Prescriptions   Medication   • Acetaminophen (TYLENOL 8 HOUR PO)   • DiphenhydrAMINE HCl (BENADRYL PO)   • Multiple Vitamins-Iron (MULTIVITAMIN/IRON PO)   • Ferrous Gluconate (IRON) 240 (27 FE) MG Tab   • Calcium-Vitamin D-Vitamin K (CALCIUM SOFT CHEWS PO)   • Probiotic Product (PROBIOTIC DAILY) Cap   • clindamycin (CLEOCIN) 300 MG Cap   • Misc. Devices Misc   • clotrimazole-betamethasone (LOTRISONE) 1-0.05 % Cream   • polyethylene glycol 3350 (MIRALAX) Powder     No current facility-administered medications for this visit.        Allergies:   Allergies   Allergen Reactions   • Amoxicillin      rashes   • Penicillins      rashes   • Tetracycline      rashes   • Augmentin Rash     .       Social Hx:   Social History     Social History   • Marital status:      Spouse name: N/A   • Number of children: N/A   • Years of education: N/A     Occupational History   • Not on file.     Social History Main Topics   • Smoking status: Never Smoker   • Smokeless tobacco: Never Used   • Alcohol use No   • Drug use: No   • Sexual activity: Yes     Partners: Male     Other Topics Concern  "  • Not on file     Social History Narrative   • No narrative on file         EXAMINATION     Physical Exam:   Vitals: Blood pressure 116/60, pulse 73, temperature 37.1 °C (98.7 °F), height 1.626 m (5' 4\"), weight 99.8 kg (220 lb), SpO2 96 %.    Constitutional:   Body Habitus: Body mass index is 37.76 kg/m².  Cooperation: Fully cooperates with exam  Appearance: Well-groomed, well-nourished, not disheveled     Eyes: No scleral icterus to suggest severe liver disease, no proptosis to suggest severe hyperthyroid    ENT -no obvious auditory deficits, no obvious tongue lesions, tongue midline, no facial droop     Skin -no rashes or lesions noted     Respiratory-  breathing comfortable on room air, no audible wheezing    Cardiovascular- capillary refills less than 2 seconds. No lower extremity edema is noted.     Gastrointestinal - no obvious abdominal masses, No tenderness to palpation in the abdomen    Psychiatric- alert and oriented ×3. Normal affect.     Gait - normal gait, no use of ambulatory device, nonantalgic. the patient can toe walk with ease. the patient can heel walk with ease. the patient can tandem walk with ease. balance is appropriate..     Musculoskeletal -   Thoracic/Lumbar Spine/Sacral Spine/Hips   Inspection: No evidence of atrophy in bilateral lower extremities throughout     ROM: full  AROM with flexion, extension, lateral flexion, and rotation bilaterally,      Palpation:   No tenderness to palpation in midline at T1-T12 levels. No tenderness to palpation in the left and right of the midline T1-L5, NEGATIVE for tenderness to palpation to the para-midline region in the lower lumbar levels.  palpation over SI joint: positive right, negative left    palpation over buttock: negative bilaterally    palpation in hip or over the greater trochanters: negative bilaterally      Lumbar spine Special tests  Neuro tension  Straight leg test positive right, negative left    Slump test positive right, negative " left      HIP  FAIR test negative bilaterally    Range of motion in the hips is within normal limits in flexion, extension, abduction, internal rotation, external rotation.    SI joint tests  Observation patient sits on one buttocks: Positive, patient prefers to sit on the left buttocks.  Thigh thrust test positive right, negative left    MARTÍNEZ test positive right, negative left       Neuro       Key points for the international standards for neurological classification of spinal cord injury (ISNCSCI) to light touch.     Dermatome R L   C4 2 2   C5 2 2   C6 2 2   C7 2 2   C8 2 2   T1 2 2   T2 2 2   L2 2 2   L3 2 2   L4 2 2   L5 2 2   S1 2 2   S2 2 2           Motor Exam Upper Extremities   ? Myotome R L   Shoulder flexion C5 5 5   Elbow flexion C5 5 5   Wrist extension C6 5 5   Elbow extension C7 5 5   Finger flexion C8 5 5   Finger abduction T1 5 5         Motor Exam Lower Extremities    ? Myotome R L   Hip flexion L2 5 5   Knee extension L3 5 5   Ankle dorsiflexion L4 5 5   Toe extension L5 5 5   Ankle plantarflexion S1 5 5           Tone on Modified Larry Scale    R L  R L   Shoulder Adduction Negative  Negative  Hip Flexion Negative  Negative    Elbow Flexion Negative  Negative  Hip Extension Negative  Negative    Elbow Extension Negative  Negative  Hip Adduction Negative  Negative    Wrist Flexion Negative  Negative  Knee Extension Negative  Negative    Finger Flexion Negative  Negative  Knee Flexion Negative  Negative       Dorsiflexion Negative  Negative       Plantar Flexion Negative  Negative      Santos’s sign negative bilaterally   Babinski sign negative bilaterally   Clonus of the ankle negative bilaterally     Reflexes  ?  R L   Biceps  2+ 2+   Brachioradialis  2+ 2+   Patella  2+ 2+   Achilles   2+ 2+           MEDICAL DECISION MAKING    Medical records review: see under HPI section.     DATA    Labs:   Lab Results   Component Value Date/Time    SODIUM 137 06/06/2016 03:05 PM    POTASSIUM 4.0  06/06/2016 03:05 PM    CHLORIDE 108 06/06/2016 03:05 PM    CO2 23 06/06/2016 03:05 PM    ANION 6.0 06/06/2016 03:05 PM    GLUCOSE 81 06/06/2016 03:05 PM    BUN 15 06/06/2016 03:05 PM    CREATININE 0.82 06/06/2016 03:05 PM    CALCIUM 9.1 06/06/2016 03:05 PM    ASTSGOT 20 06/06/2016 03:05 PM    ALTSGPT 19 06/06/2016 03:05 PM    TBILIRUBIN 0.4 06/06/2016 03:05 PM    ALBUMIN 4.1 06/06/2016 03:05 PM    TOTPROTEIN 7.1 06/06/2016 03:05 PM    GLOBULIN 3.0 06/06/2016 03:05 PM    AGRATIO 1.4 06/06/2016 03:05 PM   ]    No results found for: PROTHROMBTM, INR     Lab Results   Component Value Date/Time    WBC 5.0 07/26/2017 09:26 AM    WBC 5.1 08/09/2016 08:40 AM    RBC 4.99 07/26/2017 09:26 AM    RBC 4.63 08/09/2016 08:40 AM    HEMOGLOBIN 14.6 07/26/2017 09:26 AM    HEMOGLOBIN 8.9 (L) 08/09/2016 08:40 AM    HEMATOCRIT 44.2 07/26/2017 09:26 AM    HEMATOCRIT 30.9 (L) 08/09/2016 08:40 AM    MCV 89 07/26/2017 09:26 AM    MCV 66.7 (L) 08/09/2016 08:40 AM    MCH 29.3 07/26/2017 09:26 AM    MCH 19.2 (L) 08/09/2016 08:40 AM    MCHC 33.0 07/26/2017 09:26 AM    MCHC 28.8 (L) 08/09/2016 08:40 AM    MPV 10.6 08/09/2016 08:40 AM    NEUTSPOLYS 54 07/26/2017 09:26 AM    NEUTSPOLYS 59.40 08/09/2016 08:40 AM    LYMPHOCYTES 37 07/26/2017 09:26 AM    LYMPHOCYTES 28.70 08/09/2016 08:40 AM    MONOCYTES 7 07/26/2017 09:26 AM    MONOCYTES 9.00 08/09/2016 08:40 AM    EOSINOPHILS 2 07/26/2017 09:26 AM    EOSINOPHILS 2.70 08/09/2016 08:40 AM    BASOPHILS 0 07/26/2017 09:26 AM    BASOPHILS 0.20 08/09/2016 08:40 AM    HYPOCHROMIA 1+ 06/06/2016 03:05 PM    ANISOCYTOSIS 1+ 06/06/2016 03:05 PM        No results found for: HBA1C     Imaging: I personally reviewed following images, these are my reads  I reviewed the lumbar spine x-ray from 3/8/2017.  Possible sclerosis of the right sacroiliac joint compared to left.  Transitional anatomy with sacralized L5 transverse processes left worse than right.    IMAGING radiology reads. I reviewed the following  radiology reads                                                                                                                     Results for orders placed in visit on 03/08/17   DX-CERVICAL SPINE-2 OR 3 VIEWS    Impression Negative cervical spine.          Results for orders placed in visit on 09/29/15   DX-CHEST-2 VIEWS    Impression No pulmonary consolidation.                                          Results for orders placed during the hospital encounter of 08/30/07   DX-KNEE COMPLETE 4+    Impression IMPRESSION:    POSTOPERATIVE CHANGES AND TRICOMPARTMENTAL OSTEOARTHRITIS.  OTHERWISE   UNREMARKABLE FOUR VIEWS OF THE LEFT KNEE.        [facility]            Read By VALENTIN PETE MD on Aug 31 2007  8:11AM  : [facility] Transcription Date: Aug 31 2007  1:24PM  THIS DOCUMENT HAS BEEN ELECTRONICALLY SIGNED BY: VALENTIN PETE MD on Aug   31 2007  3:02PM      Results for orders placed in visit on 03/08/17   DX-LUMBAR SPINE-2 OR 3 VIEWS    Impression 1.  No acute lumbar spine fracture or subluxation.    2.  Stable minimal degenerative change at L5-S1.                          Results for orders placed in visit on 09/20/17   DX-SHOULDER 2+ RIGHT    Impression 1. No acute osseous abnormality.                     Diagnosis   Visit Diagnoses     ICD-10-CM   1. Chronic right SI joint pain M53.3    G89.29   2. Chronic right-sided low back pain, transitional anatomy lumbar spine M54.5    G89.29   3. Arthritis of left knee M17.12   4. S/P ACL reconstruction Z98.890   5. H/O gastric bypass Z98.84   6. Obesity (BMI 30-39.9) E66.9           ASSESSMENT:  Jen Mejía 47 y.o. female right-sided low back and hip pain likely secondary to sacroiliac joint pathology versus radiculopathy given transitional anatomy.  Multiple exam maneuvers are positive sacroiliac joint pathology so I will start with a fluoroscopically guided right sacroiliac joint injection.    Chronic left knee pain status post ACL reconstruction 25 years  ago with osteoarthritis of the left knee.  I have ordered an x-ray of the left knee for further evaluation.  I performed a diagnostic ultrasound of the left knee with multiple bone spurs consistent with osteoarthritis.  Mild knee effusion.  We discussed genicular nerve blocks/ablation and we will consider these following the x-ray.     Jen was seen today for new patient.    Diagnoses and all orders for this visit:    Chronic right SI joint pain  -     REFERRAL TO PHYSICIAL MEDICINE REHAB  -     REFERRAL TO [facility] (HIP) Services Requested: Medical Weight Management Program; Reason for Visit: Overweight/Obesity    Chronic right-sided low back pain, transitional anatomy lumbar spine  -     REFERRAL TO [facility] (HIP) Services Requested: Medical Weight Management Program; Reason for Visit: Overweight/Obesity    Arthritis of left knee  -     DX-KNEE COMPLETE 4+ LEFT  -     REFERRAL TO [facility] (HIP) Services Requested: Medical Weight Management Program; Reason for Visit: Overweight/Obesity    S/P ACL reconstruction  -     DX-KNEE COMPLETE 4+ LEFT    H/O gastric bypass  -     REFERRAL TO [facility] (HIP) Services Requested: Medical Weight Management Program; Reason for Visit: Overweight/Obesity    Obesity (BMI 30-39.9)  -     REFERRAL TO [facility] (HIP) Services Requested: Medical Weight Management Program; Reason for Visit: Overweight/Obesity            Follow-up: 7/18/2018       Please note that this dictation was created using voice recognition software. I have made every reasonable attempt to correct obvious errors but there may be errors of grammar and content that I may have overlooked prior to finalization of this note.      Nikhil Mcbride MD  Physical Medicine and Rehabilitation  Interventional Spine and Sports Physiatry  [facility]               Marcelo Stratton M.D.   Cc  Lemuel Wolfe M.D.

## 2018-06-07 NOTE — Clinical Note
Dear Lemuel Wolfe M.D. and Marcelo Dubois M.D.,   Thank you for the referral of Jen Mejía.  Thank you for the excellent workup.  I agree with the patient has right sacroiliac joint pathology and I scheduled her for a fluoroscopically guided sacroiliac joint injection.  The patient also has transitional anatomy which is on the bilateral pelvis view and I will consider an MRI lumbar spine should the sacroiliac joint not be effective.  The patient likely has at least moderate grade osteoarthritis of the left knee and I have ordered an x-ray for follow-up.  I also discussed genicular nerve blocks/ablation with the patient.  Please see my note for more details    Should you have any questions or concerns please do not hesitate to contact me.  Nikhil Mcbride M.D.

## 2018-07-02 ENCOUNTER — HOSPITAL ENCOUNTER (OUTPATIENT)
Dept: PAIN MANAGEMENT | Facility: REHABILITATION | Age: 48
End: 2018-07-02
Attending: PHYSICAL MEDICINE & REHABILITATION
Payer: COMMERCIAL

## 2018-07-02 ENCOUNTER — HOSPITAL ENCOUNTER (OUTPATIENT)
Dept: RADIOLOGY | Facility: REHABILITATION | Age: 48
End: 2018-07-02
Attending: PHYSICAL MEDICINE & REHABILITATION

## 2018-07-02 VITALS
HEIGHT: 65 IN | HEART RATE: 67 BPM | OXYGEN SATURATION: 97 % | DIASTOLIC BLOOD PRESSURE: 89 MMHG | RESPIRATION RATE: 16 BRPM | TEMPERATURE: 97.4 F | BODY MASS INDEX: 36.62 KG/M2 | SYSTOLIC BLOOD PRESSURE: 126 MMHG | WEIGHT: 219.8 LBS

## 2018-07-02 PROCEDURE — G0260 INJ FOR SACROILIAC JT ANESTH: HCPCS

## 2018-07-02 PROCEDURE — 700117 HCHG RX CONTRAST REV CODE 255

## 2018-07-02 PROCEDURE — 700111 HCHG RX REV CODE 636 W/ 250 OVERRIDE (IP)

## 2018-07-02 RX ORDER — DEXAMETHASONE SODIUM PHOSPHATE 10 MG/ML
INJECTION, SOLUTION INTRAMUSCULAR; INTRAVENOUS
Status: COMPLETED
Start: 2018-07-02 | End: 2018-07-02

## 2018-07-02 RX ORDER — LIDOCAINE HYDROCHLORIDE 10 MG/ML
INJECTION, SOLUTION EPIDURAL; INFILTRATION; INTRACAUDAL; PERINEURAL
Status: COMPLETED
Start: 2018-07-02 | End: 2018-07-02

## 2018-07-02 RX ADMIN — IOHEXOL 1 ML: 240 INJECTION, SOLUTION INTRATHECAL; INTRAVASCULAR; INTRAVENOUS; ORAL at 15:53

## 2018-07-02 RX ADMIN — DEXAMETHASONE SODIUM PHOSPHATE 10 MG: 10 INJECTION, SOLUTION INTRAMUSCULAR; INTRAVENOUS at 15:55

## 2018-07-02 RX ADMIN — LIDOCAINE HYDROCHLORIDE 10 ML: 10 INJECTION, SOLUTION EPIDURAL; INFILTRATION; INTRACAUDAL; PERINEURAL at 15:52

## 2018-07-02 ASSESSMENT — PAIN SCALES - GENERAL
PAINLEVEL_OUTOF10: 7
PAINLEVEL_OUTOF10: 8
PAINLEVEL_OUTOF10: 7

## 2018-07-02 NOTE — PROCEDURES
Date of Service: 7/2/2018     Patient: Jen Mejía 47 y.o. female     MRN: [id number]     Physician/s: Nikhil Mcbride MD    Pre-operative Diagnosis: Lumbosacral spondylosis, Sacroiliac dysfunction    Post-operative Diagnosis: Lumbosacral spondylosis, Sacroiliac dysfunction    Procedure: RIGHT   Sacroiliac joint injection    Description of procedure:    The risks, benefits, and alternatives of the procedure were reviewed and discussed with the patient.  Written informed consent was freely obtained. A pre-procedural time-out was conducted by the physician verifying patient’s identity, procedure to be performed, procedure site and side, and allergy verification. Appropriate equipment was determined to be in place for the procedure.       In the fluoroscopy suite the patient was placed in a prone position and the skin was prepped and draped in the usual sterile fashion. The fluoroscope was placed over the right  sacroiliac joint at the appropriate angle for joint entrance, and the target for injection was marked. A 27g needle was placed into each of the marked level(s), and approx 2cc of 1% Lidocaine was injected subcutaneously into the epidermal and dermal layers. The needle was removed. A 25g 3.5 inch needle was then placed down to the level of bone at the inferior/distal aspect of the joint. The needle was then retracted and carefully advanced into the joint capsule. The needle tip was then verified by a lateral view. In the AP view, contrast dye was used to highlight the entire joint line while the fluoroscope was running live. Following negative aspiration, approx 0.5mL of 1% lidocaine with 0.5mL of 10mg/mL dexamethasone was then injected. The needle was removed intact after restyleted. The patient's low back was covered with a 4x4 gauze, the area was cleansed with sterile normal saline, and a dressing was applied. There were no complications noted.     The patient was then evaluated while  sitting, to observe  the patient's pain level, noting if the patient received an immediate 100% relief on the side(s) of the injection.    The patient's follow-up visit is scheduled for 7/18/2018     Nikhil Mcbride MD  Physical Medicine and Rehabilitation  Interventional Spine and Sports Physiatry  [facility]  7/2/2018        CPT  sacroiliac joint with fluoroscopy [id number]

## 2018-07-02 NOTE — PROGRESS NOTES
Reviewed Plan of Care with patient. Confirmed that she does not take any blood thinning medications.  Confirmed that she has a . Reviewed home care instructions with patient prior to procedure. All questions and concerns addressed.   Dr Mcbride in to evaluate pt.

## 2018-07-02 NOTE — PROGRESS NOTES
Positioned patient by CST,RN, X - ray Tech. Both lower legs & feet pillow placed for support. Hands supported on stool under head of the bed. Procedure tolerated well by patient. Accompanied to recovery room, ambulatory.

## 2018-07-09 ENCOUNTER — HOSPITAL ENCOUNTER (OUTPATIENT)
Dept: RADIOLOGY | Facility: MEDICAL CENTER | Age: 48
End: 2018-07-09
Attending: PHYSICAL MEDICINE & REHABILITATION
Payer: COMMERCIAL

## 2018-07-09 ENCOUNTER — TELEPHONE (OUTPATIENT)
Dept: PHYSICAL MEDICINE AND REHAB | Facility: MEDICAL CENTER | Age: 48
End: 2018-07-09

## 2018-07-09 PROCEDURE — 73564 X-RAY EXAM KNEE 4 OR MORE: CPT | Mod: LT

## 2018-07-09 NOTE — TELEPHONE ENCOUNTER
Spoke to Pt in regards to her Special procedure that was done 06/02/18 w/ Dr. Mcbride and she mentioned that she had chills like 25 hours and she suspected like a Flu but overall after that she was fine.    Thank you  Patricia

## 2018-07-18 ENCOUNTER — OFFICE VISIT (OUTPATIENT)
Dept: PHYSICAL MEDICINE AND REHAB | Facility: MEDICAL CENTER | Age: 48
End: 2018-07-18
Payer: COMMERCIAL

## 2018-07-18 VITALS
WEIGHT: 222 LBS | HEART RATE: 76 BPM | BODY MASS INDEX: 37.9 KG/M2 | HEIGHT: 64 IN | OXYGEN SATURATION: 96 % | DIASTOLIC BLOOD PRESSURE: 72 MMHG | SYSTOLIC BLOOD PRESSURE: 108 MMHG | TEMPERATURE: 99 F

## 2018-07-18 DIAGNOSIS — G89.29 CHRONIC RIGHT SI JOINT PAIN: ICD-10-CM

## 2018-07-18 DIAGNOSIS — M16.11 ARTHRITIS OF RIGHT HIP: ICD-10-CM

## 2018-07-18 DIAGNOSIS — M53.3 CHRONIC RIGHT SI JOINT PAIN: ICD-10-CM

## 2018-07-18 DIAGNOSIS — G89.29 CHRONIC BILATERAL LOW BACK PAIN WITHOUT SCIATICA: ICD-10-CM

## 2018-07-18 DIAGNOSIS — Z98.890 S/P ACL RECONSTRUCTION: ICD-10-CM

## 2018-07-18 DIAGNOSIS — Z98.84 H/O GASTRIC BYPASS: ICD-10-CM

## 2018-07-18 DIAGNOSIS — M17.12 ARTHRITIS OF LEFT KNEE: ICD-10-CM

## 2018-07-18 DIAGNOSIS — M54.50 CHRONIC BILATERAL LOW BACK PAIN WITHOUT SCIATICA: ICD-10-CM

## 2018-07-18 DIAGNOSIS — E66.9 OBESITY (BMI 30-39.9): ICD-10-CM

## 2018-07-18 PROCEDURE — 99214 OFFICE O/P EST MOD 30 MIN: CPT | Performed by: PHYSICAL MEDICINE & REHABILITATION

## 2018-07-18 ASSESSMENT — PAIN SCALES - GENERAL: PAINLEVEL: 8=MODERATE-SEVERE PAIN

## 2018-07-18 NOTE — PROGRESS NOTES
Follow up patient note  Interventional spine and sports physiatry, Physical medicine rehabilitation      Chief complaint:   Chief Complaint   Patient presents with   • Follow-Up     Post-right sacroiliac joint injection with fluoroscopic guidance          HISTORY    Please see new patient note dated 7/9/2018  by Dr Mcbride,  for more details.     HPI  Patient identification: Jen Mejía 47 y.o. female status post left knee ACL reconstruction, severe osteoarthritis the left knee, right sacroiliac pain, chronic low back pain.    Interval history:  In the interim the patient had significant pain relief in the right sacroiliac joint after fluoroscopically guided injection with near complete resolution of the pain in this joint.  However this is unmask axial low back pain which is more towards the midline worse with lumbar extension, 7/10 intensity, aching in quality, nonradiating, constant.    Patient's chronic left knee pain is also not controlled, severe in intensity, worse with walking, aching, nonradiating.       ROS Red Flags :   Fever, Chills, Sweats: Denies  Involuntary Weight Loss: Denies  Bowel/Bladder Incontinence: Denies  Saddle Anesthesia: Denies        PMHx:   Past Medical History:   Diagnosis Date   • Anemia    • Anesthesia     nausea   • Arthritis     deg disc disease lower back   • Bowel habit changes     constipation   • Gynecological disorder     fibroids, irreg periods   • Hemorrhoids    • Obesity        PSHx:   Past Surgical History:   Procedure Laterality Date   • VAGINAL HYSTERECTOMY SCOPE TOTAL  6/9/2016    Procedure: VAGINAL HYSTERECTOMY SCOPE TOTAL, BILATERAL SALPINGECTOMY;  Surgeon: Hue Jeong M.D.;  Location: SURGERY SAME DAY [facility];  Service:    • CYSTOSCOPY N/A 6/9/2016    Procedure: CYSTOSCOPY;  Surgeon: Hue Jeong M.D.;  Location: SURGERY SAME DAY [facility];  Service:    • GASTRIC BYPASS LAPAROSCOPIC  10/12/2015    Procedure: GASTRIC BYPASS LAPAROSCOPIC SUSHIL EN Y;   Surgeon: John H Ganser, M.D.;  Location: SURGERY [facility];  Service:    • ACL RECONSTRUCTION     • CHOLECYSTECTOMY     • KNEE ARTHROSCOPY      multiple   • TONSILLECTOMY     • US-NEEDLE CORE BX-BREAST PANEL         Family history   Denies neuromuscular disease  Family History   Problem Relation Age of Onset   • Diabetes Father          Medications:   Current Outpatient Prescriptions   Medication   • Multiple Vitamins-Iron (MULTIVITAMIN/IRON PO)   • Ferrous Gluconate (IRON) 240 (27 FE) MG Tab   • Calcium-Vitamin D-Vitamin K (CALCIUM SOFT CHEWS PO)   • Probiotic Product (PROBIOTIC DAILY) Cap   • Acetaminophen (TYLENOL 8 HOUR PO)   • DiphenhydrAMINE HCl (BENADRYL PO)   • clindamycin (CLEOCIN) 300 MG Cap   • Misc. Devices Misc   • clotrimazole-betamethasone (LOTRISONE) 1-0.05 % Cream   • polyethylene glycol 3350 (MIRALAX) Powder     No current facility-administered medications for this visit.        Allergies:   Allergies   Allergen Reactions   • Amoxicillin      rashes   • Penicillins      rashes   • Tetracycline      rashes   • Augmentin Rash     .       Social Hx:   Social History     Social History   • Marital status:      Spouse name: N/A   • Number of children: N/A   • Years of education: N/A     Occupational History   • Not on file.     Social History Main Topics   • Smoking status: Never Smoker   • Smokeless tobacco: Never Used   • Alcohol use No   • Drug use: No   • Sexual activity: Yes     Partners: Male     Other Topics Concern   •  Service No   • Blood Transfusions No   • Caffeine Concern No   • Occupational Exposure No   • Hobby Hazards No   • Sleep Concern Yes   • Stress Concern Yes   • Weight Concern Yes   • Special Diet No   • Back Care No   • Exercise Yes   • Bike Helmet Yes   • Seat Belt Yes   • Self-Exams Yes     Social History Narrative   • No narrative on file         EXAMINATION     Physical Exam:   Vitals: Blood pressure 108/72, pulse 76, temperature 37.2 °C (99 °F), height  "1.626 m (5' 4\"), weight 100.7 kg (222 lb), SpO2 96 %.    Constitutional:   Body Habitus: Body mass index is 38.11 kg/m².  Cooperation: Fully cooperates with exam  Appearance: Well-groomed no disheveled     Respiratory-  breathing comfortable on room air, no audible wheezing  Cardiovascular- capillary refills less than 2 seconds. No lower extremity edema is noted.   Psychiatric- alert and oriented ×3. Normal affect.   Spine:  No signs of infection over the injection site.  This is clear, dry and intact.  Pain with lumbar extension rotation in the bilateral lumbar spine.  Now there is no tenderness palpation of the sacroiliac joint which is significantly improved           MEDICAL DECISION MAKING    DATA    Labs:   Lab Results   Component Value Date/Time    SODIUM 137 06/06/2016 03:05 PM    POTASSIUM 4.0 06/06/2016 03:05 PM    CHLORIDE 108 06/06/2016 03:05 PM    CO2 23 06/06/2016 03:05 PM    GLUCOSE 81 06/06/2016 03:05 PM    BUN 15 06/06/2016 03:05 PM    CREATININE 0.82 06/06/2016 03:05 PM    BUNCREATRAT 18 01/06/2016 04:44 PM        No results found for: PROTHROMBTM, INR     Lab Results   Component Value Date/Time    WBC 5.0 07/26/2017 09:26 AM    WBC 5.1 08/09/2016 08:40 AM    RBC 4.99 07/26/2017 09:26 AM    RBC 4.63 08/09/2016 08:40 AM    HEMOGLOBIN 14.6 07/26/2017 09:26 AM    HEMOGLOBIN 8.9 (L) 08/09/2016 08:40 AM    HEMATOCRIT 44.2 07/26/2017 09:26 AM    HEMATOCRIT 30.9 (L) 08/09/2016 08:40 AM    MCV 89 07/26/2017 09:26 AM    MCV 66.7 (L) 08/09/2016 08:40 AM    MCH 29.3 07/26/2017 09:26 AM    MCH 19.2 (L) 08/09/2016 08:40 AM    MCHC 33.0 07/26/2017 09:26 AM    MCHC 28.8 (L) 08/09/2016 08:40 AM    MPV 10.6 08/09/2016 08:40 AM    NEUTSPOLYS 54 07/26/2017 09:26 AM    NEUTSPOLYS 59.40 08/09/2016 08:40 AM    LYMPHOCYTES 37 07/26/2017 09:26 AM    LYMPHOCYTES 28.70 08/09/2016 08:40 AM    MONOCYTES 7 07/26/2017 09:26 AM    MONOCYTES 9.00 08/09/2016 08:40 AM    EOSINOPHILS 2 07/26/2017 09:26 AM    EOSINOPHILS 2.70 " 08/09/2016 08:40 AM    BASOPHILS 0 07/26/2017 09:26 AM    BASOPHILS 0.20 08/09/2016 08:40 AM    HYPOCHROMIA 1+ 06/06/2016 03:05 PM    ANISOCYTOSIS 1+ 06/06/2016 03:05 PM        No results found for: HBA1C       Imaging: I personally reviewed following images    X-ray left knee 7/9/2018  Status post ACL reconstruction with screws in place.  Severe osteoarthritis of the all 3 compartments.  Calcification in the expected area of the mid patellar tendon.  There is also a calcification which appears to be intra-articular and approximately 10 mm seen on the lateral view.      I reviewed the following radiology reports                                                                                        Results for orders placed in visit on 03/08/17   DX-CERVICAL SPINE-2 OR 3 VIEWS    Impression Negative cervical spine.          Results for orders placed in visit on 09/29/15   DX-CHEST-2 VIEWS    Impression No pulmonary consolidation.                                          Results for orders placed in visit on 06/07/18   DX-KNEE COMPLETE 4+ LEFT    Impression 1.  Severe tricompartmental osteoarthritis    2.  Associated intra-articular ossific body    3.  Changes consistent with prior anterior cruciate ligament repair      Results for orders placed in visit on 03/08/17   DX-LUMBAR SPINE-2 OR 3 VIEWS    Impression 1.  No acute lumbar spine fracture or subluxation.    2.  Stable minimal degenerative change at L5-S1.                          Results for orders placed in visit on 09/20/17   DX-SHOULDER 2+ RIGHT    Impression 1. No acute osseous abnormality.                     DIAGNOSIS   Visit Diagnoses     ICD-10-CM   1. Chronic right SI joint pain M53.3    G89.29   2. Chronic bilateral low back pain without sciatica M54.5    G89.29   3. Arthritis of left knee M17.12   4. S/P ACL reconstruction Z98.890   5. H/O gastric bypass Z98.84   6. Obesity (BMI 30-39.9) E66.9   7. Arthritis of right hip M16.11         ASSESSMENT and  PLAN:     Jen Mejía 47 y.o. female      Jen was seen today for follow-up.    Diagnoses and all orders for this visit:    Chronic right SI joint pain  Comments:  Stable, improved following injection  Orders:  -     MR-LUMBAR SPINE-W/O; Future    Chronic bilateral low back pain without sciatica  Comments:  Axial low back pain unmasked after sacroiliac joint was treated.  Likely lumbar spondylosis versus disc herniation.  MRI ordered.  failed conservative treatment  Orders:  -     MR-LUMBAR SPINE-W/O; Future    Arthritis of left knee  Comments:  Severe left knee arthritis with intra-articular calcification.  The patient is unsure if this is a workers comp injury and will investigate.  Needs Ortho ref    S/P ACL reconstruction    H/O gastric bypass    Obesity (BMI 30-39.9)  Comments:  Stable, weight loss recommended    Arthritis of right hip  Comments:  stable          Follow up: 8/1/2018     Thank you for allowing me to participate in the care of this patient. If you have any questions please not hesitate to contact me.        Please note that this dictation was created using voice recognition software. I have made every reasonable attempt to correct obvious errors but there may be errors of grammar and content that I may have overlooked prior to finalization of this note.      Nikhil Mcbride MD  Interventional Spine and Sports Physiatry  Physical Medicine and Rehabilitation  [facility]  7/18/2018  8:17 AM

## 2018-07-18 NOTE — NON-PROVIDER
Follow up patient note  Interventional spine and sports physiatry, Physical medicine rehabilitation      Chief complaint:   Chief Complaint   Patient presents with   • Follow-Up     Post-right sacroiliac joint injection with fluoroscopic guidance         HISTORY    Please see new patient note dated 7/9/2018 by Dr Mcbride,  for more details.     HPI  Patient identification: Jen Mejía 47 y.o. female who came in for follow-up s/p SI joint injection. She states that the pain along her right SI joint has improved significantly since the injection. However, the patient continues to have bilateral lower back pain along the tailbone. The bilateral lower pain pain gets worse with standing for long periods of time. The patient also reports persistent left knee pain. She has tried ice and epsom salts to loosen it, which hasn't provided significant relief.      Interval history:  The patient was last seen by me on 7/9/2018        ROS Red Flags :   Fever, Chills, Sweats: Denies  Involuntary Weight Loss: Denies  Bowel/Bladder Incontinence: Denies  Saddle Anesthesia: Denies        PMHx:   Past Medical History:   Diagnosis Date   • Anemia    • Anesthesia     nausea   • Arthritis     deg disc disease lower back   • Bowel habit changes     constipation   • Gynecological disorder     fibroids, irreg periods   • Hemorrhoids    • Obesity        PSHx:   Past Surgical History:   Procedure Laterality Date   • VAGINAL HYSTERECTOMY SCOPE TOTAL  6/9/2016    Procedure: VAGINAL HYSTERECTOMY SCOPE TOTAL, BILATERAL SALPINGECTOMY;  Surgeon: Hue Jeong M.D.;  Location: SURGERY SAME DAY [facility];  Service:    • CYSTOSCOPY N/A 6/9/2016    Procedure: CYSTOSCOPY;  Surgeon: Hue Jeong M.D.;  Location: SURGERY SAME DAY [facility];  Service:    • GASTRIC BYPASS LAPAROSCOPIC  10/12/2015    Procedure: GASTRIC BYPASS LAPAROSCOPIC SUSHIL EN Y;  Surgeon: John H Ganser, M.D.;  Location: SURGERY [facility];  Service:    • ACL  "RECONSTRUCTION     • CHOLECYSTECTOMY     • KNEE ARTHROSCOPY      multiple   • TONSILLECTOMY     • US-NEEDLE CORE BX-BREAST PANEL         Family history   Denies neuromuscular disease  Family History   Problem Relation Age of Onset   • Diabetes Father          Medications:   Current Outpatient Prescriptions   Medication   • Multiple Vitamins-Iron (MULTIVITAMIN/IRON PO)   • Ferrous Gluconate (IRON) 240 (27 FE) MG Tab   • Calcium-Vitamin D-Vitamin K (CALCIUM SOFT CHEWS PO)   • Probiotic Product (PROBIOTIC DAILY) Cap   • Acetaminophen (TYLENOL 8 HOUR PO)   • DiphenhydrAMINE HCl (BENADRYL PO)   • clindamycin (CLEOCIN) 300 MG Cap   • Misc. Devices Misc   • clotrimazole-betamethasone (LOTRISONE) 1-0.05 % Cream   • polyethylene glycol 3350 (MIRALAX) Powder     No current facility-administered medications for this visit.        Allergies:   Allergies   Allergen Reactions   • Amoxicillin      rashes   • Penicillins      rashes   • Tetracycline      rashes   • Augmentin Rash     .       Social Hx:   Social History     Social History   • Marital status:      Spouse name: N/A   • Number of children: N/A   • Years of education: N/A     Occupational History   • Not on file.     Social History Main Topics   • Smoking status: Never Smoker   • Smokeless tobacco: Never Used   • Alcohol use No   • Drug use: No   • Sexual activity: Yes     Partners: Male     Other Topics Concern   •  Service No   • Blood Transfusions No   • Caffeine Concern No   • Occupational Exposure No   • Hobby Hazards No   • Sleep Concern Yes   • Stress Concern Yes   • Weight Concern Yes   • Special Diet No   • Back Care No   • Exercise Yes   • Bike Helmet Yes   • Seat Belt Yes   • Self-Exams Yes     Social History Narrative   • No narrative on file         EXAMINATION     Physical Exam:   Vitals: Blood pressure 108/72, pulse 76, temperature 37.2 °C (99 °F), height 1.626 m (5' 4\"), weight 100.7 kg (222 lb), SpO2 96 %.    Constitutional:   Body " Habitus: Body mass index is 38.11 kg/m².  Cooperation: Fully cooperates with exam  Appearance: Well-groomed no disheveled     Respiratory-  breathing comfortable on room air, no audible wheezing  Cardiovascular- capillary refills less than 2 seconds. No lower extremity edema is noted.   Psychiatric- alert and oriented ×3. Normal affect.   Spine: pain noted on lower back           MEDICAL DECISION MAKING    DATA    Labs:   Lab Results   Component Value Date/Time    SODIUM 137 06/06/2016 03:05 PM    POTASSIUM 4.0 06/06/2016 03:05 PM    CHLORIDE 108 06/06/2016 03:05 PM    CO2 23 06/06/2016 03:05 PM    GLUCOSE 81 06/06/2016 03:05 PM    BUN 15 06/06/2016 03:05 PM    CREATININE 0.82 06/06/2016 03:05 PM    BUNCREATRAT 18 01/06/2016 04:44 PM        No results found for: PROTHROMBTM, INR     Lab Results   Component Value Date/Time    WBC 5.0 07/26/2017 09:26 AM    WBC 5.1 08/09/2016 08:40 AM    RBC 4.99 07/26/2017 09:26 AM    RBC 4.63 08/09/2016 08:40 AM    HEMOGLOBIN 14.6 07/26/2017 09:26 AM    HEMOGLOBIN 8.9 (L) 08/09/2016 08:40 AM    HEMATOCRIT 44.2 07/26/2017 09:26 AM    HEMATOCRIT 30.9 (L) 08/09/2016 08:40 AM    MCV 89 07/26/2017 09:26 AM    MCV 66.7 (L) 08/09/2016 08:40 AM    MCH 29.3 07/26/2017 09:26 AM    MCH 19.2 (L) 08/09/2016 08:40 AM    MCHC 33.0 07/26/2017 09:26 AM    MCHC 28.8 (L) 08/09/2016 08:40 AM    MPV 10.6 08/09/2016 08:40 AM    NEUTSPOLYS 54 07/26/2017 09:26 AM    NEUTSPOLYS 59.40 08/09/2016 08:40 AM    LYMPHOCYTES 37 07/26/2017 09:26 AM    LYMPHOCYTES 28.70 08/09/2016 08:40 AM    MONOCYTES 7 07/26/2017 09:26 AM    MONOCYTES 9.00 08/09/2016 08:40 AM    EOSINOPHILS 2 07/26/2017 09:26 AM    EOSINOPHILS 2.70 08/09/2016 08:40 AM    BASOPHILS 0 07/26/2017 09:26 AM    BASOPHILS 0.20 08/09/2016 08:40 AM    HYPOCHROMIA 1+ 06/06/2016 03:05 PM    ANISOCYTOSIS 1+ 06/06/2016 03:05 PM        No results found for: HBA1C       Imaging: I personally reviewed following images    X-ray lumbar spine dated    MRI lumbar  spine dated     I reviewed the following radiology reports                                                                                                                                                                                                        Results for orders placed in visit on 03/08/17   DX-CERVICAL SPINE-2 OR 3 VIEWS    Impression Negative cervical spine.          Results for orders placed in visit on 09/29/15   DX-CHEST-2 VIEWS    Impression No pulmonary consolidation.                                          Results for orders placed in visit on 06/07/18   DX-KNEE COMPLETE 4+ LEFT    Impression 1.  Severe tricompartmental osteoarthritis    2.  Associated intra-articular ossific body    3.  Changes consistent with prior anterior cruciate ligament repair      Results for orders placed in visit on 03/08/17   DX-LUMBAR SPINE-2 OR 3 VIEWS    Impression 1.  No acute lumbar spine fracture or subluxation.    2.  Stable minimal degenerative change at L5-S1.                          Results for orders placed in visit on 09/20/17   DX-SHOULDER 2+ RIGHT    Impression 1. No acute osseous abnormality.                     DIAGNOSIS   Visit Diagnoses     ICD-10-CM   1. Chronic right SI joint pain M53.3    G89.29   2. Chronic bilateral low back pain without sciatica M54.5    G89.29   3. Arthritis of left knee M17.12   4. S/P ACL reconstruction Z98.890   5. H/O gastric bypass Z98.84   6. Obesity (BMI 30-39.9) E66.9   7. Arthritis of right hip M16.11         ASSESSMENT and PLAN:     Jen Mejía 47 y.o. female who came in for follow-up s/p right SI joint injection.     Bilateral lower back pain  The patient reports persistent bilateral lower back pain. SI joint injection relieved pain on right buttocks but not the lower back. Patient has failed conservative treatment. Plan is to order MRI of lumbar spine.    Left knee pain  Patient reports persistent left knee pain. X-ray showed severe left knee arthritis.  Plan is to refer to [facility]     Jen was seen today for follow-up.    Diagnoses and all orders for this visit:    Chronic right SI joint pain  Comments:  Stable, improved following injection  Orders:  -     MR-LUMBAR SPINE-W/O; Future    Chronic bilateral low back pain without sciatica  Comments:  Axial low back pain unmasked after sacroiliac joint was treated.  Likely lumbar spondylosis versus disc herniation.  MRI ordered.  failed conservative treatment  Orders:  -     MR-LUMBAR SPINE-W/O; Future    Arthritis of left knee  Comments:  Severe left knee arthritis with intra-articular calcification.  The patient is unsure if this is a workers comp injury and will investigate.  Needs Ortho ref    S/P ACL reconstruction    H/O gastric bypass    Obesity (BMI 30-39.9)  Comments:  Stable, weight loss recommended    Arthritis of right hip  Comments:  stable          Follow up: {cktime follow up:[id number]}    Thank you for allowing me to participate in the care of this patient. If you have any questions please not hesitate to contact me.      Total time: 15 minutes face-to-face time. I spent greater than 50% of the time for patient care and coordination on this date, including with the patient as per assessment and plan above.         Please note that this dictation was created using voice recognition software. I have made every reasonable attempt to correct obvious errors but there may be errors of grammar and content that I may have overlooked prior to finalization of this note.      TAISHA Lopez  7/18/2018  8:19 AM

## 2018-07-26 ENCOUNTER — HOSPITAL ENCOUNTER (OUTPATIENT)
Dept: RADIOLOGY | Facility: MEDICAL CENTER | Age: 48
End: 2018-07-26
Attending: PHYSICAL MEDICINE & REHABILITATION
Payer: COMMERCIAL

## 2018-07-26 DIAGNOSIS — M54.50 CHRONIC BILATERAL LOW BACK PAIN WITHOUT SCIATICA: ICD-10-CM

## 2018-07-26 DIAGNOSIS — G89.29 CHRONIC BILATERAL LOW BACK PAIN WITHOUT SCIATICA: ICD-10-CM

## 2018-07-26 DIAGNOSIS — G89.29 CHRONIC RIGHT SI JOINT PAIN: ICD-10-CM

## 2018-07-26 DIAGNOSIS — M53.3 CHRONIC RIGHT SI JOINT PAIN: ICD-10-CM

## 2018-07-26 PROCEDURE — 72148 MRI LUMBAR SPINE W/O DYE: CPT

## 2018-08-01 ENCOUNTER — OFFICE VISIT (OUTPATIENT)
Dept: PHYSICAL MEDICINE AND REHAB | Facility: MEDICAL CENTER | Age: 48
End: 2018-08-01
Payer: COMMERCIAL

## 2018-08-01 VITALS
SYSTOLIC BLOOD PRESSURE: 120 MMHG | HEIGHT: 64 IN | DIASTOLIC BLOOD PRESSURE: 78 MMHG | HEART RATE: 85 BPM | OXYGEN SATURATION: 96 % | WEIGHT: 225.09 LBS | TEMPERATURE: 98.2 F | BODY MASS INDEX: 38.43 KG/M2

## 2018-08-01 DIAGNOSIS — G89.29 CHRONIC RIGHT SI JOINT PAIN: ICD-10-CM

## 2018-08-01 DIAGNOSIS — Z98.890 S/P ACL RECONSTRUCTION: ICD-10-CM

## 2018-08-01 DIAGNOSIS — M54.50 CHRONIC BILATERAL LOW BACK PAIN WITHOUT SCIATICA: ICD-10-CM

## 2018-08-01 DIAGNOSIS — G89.29 CHRONIC RIGHT-SIDED LOW BACK PAIN, WITH SCIATICA PRESENCE UNSPECIFIED: ICD-10-CM

## 2018-08-01 DIAGNOSIS — Z98.84 H/O GASTRIC BYPASS: ICD-10-CM

## 2018-08-01 DIAGNOSIS — M17.12 ARTHRITIS OF LEFT KNEE: ICD-10-CM

## 2018-08-01 DIAGNOSIS — M16.11 ARTHRITIS OF RIGHT HIP: ICD-10-CM

## 2018-08-01 DIAGNOSIS — G89.29 CHRONIC BILATERAL LOW BACK PAIN WITHOUT SCIATICA: ICD-10-CM

## 2018-08-01 DIAGNOSIS — M54.5 CHRONIC RIGHT-SIDED LOW BACK PAIN, WITH SCIATICA PRESENCE UNSPECIFIED: ICD-10-CM

## 2018-08-01 DIAGNOSIS — M53.3 CHRONIC RIGHT SI JOINT PAIN: ICD-10-CM

## 2018-08-01 PROCEDURE — 99214 OFFICE O/P EST MOD 30 MIN: CPT | Performed by: PHYSICAL MEDICINE & REHABILITATION

## 2018-08-01 RX ORDER — ACETAMINOPHEN 500 MG
1000 TABLET ORAL 3 TIMES DAILY PRN
Qty: 180 TAB | Refills: 6 | Status: SHIPPED | OUTPATIENT
Start: 2018-08-01 | End: 2021-10-11

## 2018-08-01 ASSESSMENT — PAIN SCALES - GENERAL: PAINLEVEL: 8=MODERATE-SEVERE PAIN

## 2018-08-01 NOTE — PROGRESS NOTES
Follow up patient note  Interventional spine and sports physiatry, Physical medicine rehabilitation      Chief complaint:   Chief Complaint   Patient presents with   • Follow-Up     Chronic right SI joint pain          HISTORY    Please see new patient note dated 7/9/2018  by Dr Mcbride,  for more details.     HPI  Patient identification: Jen Mejía 47 y.o. female status post left knee ACL reconstruction, severe osteoarthritis the left knee, right sacroiliac pain, chronic low back pain.    Interval history:    Bilateral low back pain is not significantly improved when compared to the previous visit.  Pain is now 8/10 intensity, aching, bilateral low back worse with lumbar extension.  Exacerbating factors are not but consistent however.  Prolonged standing does consistently make the back pain worse.  Hip pain and sitting tolerance continues to be improved following sacroiliac joint injection.       ROS Red Flags :   Fever, Chills, Sweats: Denies  Involuntary Weight Loss: Denies  Bowel/Bladder Incontinence: Denies  Saddle Anesthesia: Denies        PMHx:   Past Medical History:   Diagnosis Date   • Anemia    • Anesthesia     nausea   • Arthritis     deg disc disease lower back   • Bowel habit changes     constipation   • Gynecological disorder     fibroids, irreg periods   • Hemorrhoids    • Obesity        PSHx:   Past Surgical History:   Procedure Laterality Date   • VAGINAL HYSTERECTOMY SCOPE TOTAL  6/9/2016    Procedure: VAGINAL HYSTERECTOMY SCOPE TOTAL, BILATERAL SALPINGECTOMY;  Surgeon: Hue Jeong M.D.;  Location: SURGERY SAME DAY [facility] ORS;  Service:    • CYSTOSCOPY N/A 6/9/2016    Procedure: CYSTOSCOPY;  Surgeon: Hue Jeong M.D.;  Location: SURGERY SAME DAY [facility] OR[facility];  Service:    • GASTRIC BYPASS LAPAROSCOPIC  10/12/2015    Procedure: GASTRIC BYPASS LAPAROSCOPIC SUSHIL EN Y;  Surgeon: John H Ganser, M.D.;  Location: SURGERY [facility];  Service:    • ACL RECONSTRUCTION     •  "CHOLECYSTECTOMY     • KNEE ARTHROSCOPY      multiple   • TONSILLECTOMY     • US-NEEDLE CORE BX-BREAST PANEL         Family history   Denies neuromuscular disease  Family History   Problem Relation Age of Onset   • Diabetes Father          Medications:   Current Outpatient Prescriptions   Medication   • acetaminophen (TYLENOL) 500 MG Tab   • Multiple Vitamins-Iron (MULTIVITAMIN/IRON PO)   • Ferrous Gluconate (IRON) 240 (27 FE) MG Tab   • Probiotic Product (PROBIOTIC DAILY) Cap   • DiphenhydrAMINE HCl (BENADRYL PO)   • clindamycin (CLEOCIN) 300 MG Cap   • Misc. Devices Misc   • clotrimazole-betamethasone (LOTRISONE) 1-0.05 % Cream   • polyethylene glycol 3350 (MIRALAX) Powder   • Calcium-Vitamin D-Vitamin K (CALCIUM SOFT CHEWS PO)     No current facility-administered medications for this visit.        Allergies:   Allergies   Allergen Reactions   • Amoxicillin      rashes   • Penicillins      rashes   • Tetracycline      rashes   • Augmentin Rash     .       Social Hx:   Social History     Social History   • Marital status:      Spouse name: N/A   • Number of children: N/A   • Years of education: N/A     Occupational History   • Not on file.     Social History Main Topics   • Smoking status: Never Smoker   • Smokeless tobacco: Never Used   • Alcohol use No   • Drug use: No   • Sexual activity: Yes     Partners: Male     Other Topics Concern   •  Service No   • Blood Transfusions No   • Caffeine Concern No   • Occupational Exposure No   • Hobby Hazards No   • Sleep Concern Yes   • Stress Concern Yes   • Weight Concern Yes   • Special Diet No   • Back Care No   • Exercise Yes   • Bike Helmet Yes   • Seat Belt Yes   • Self-Exams Yes     Social History Narrative   • No narrative on file         EXAMINATION     Physical Exam:   Vitals: Blood pressure 120/78, pulse 85, temperature 36.8 °C (98.2 °F), height 1.626 m (5' 4\"), weight 102.1 kg (225 lb 1.4 oz), SpO2 96 %.    Constitutional:   Body Habitus: Body " mass index is 38.64 kg/m².  Cooperation: Fully cooperates with exam  Appearance: Well-groomed no disheveled     Respiratory-  breathing comfortable on room air, no audible wheezing  Cardiovascular- capillary refills less than 2 seconds. No lower extremity edema is noted.   Psychiatric- alert and oriented ×3. Normal affect.   Spine: No tenderness palpation of the lumbar spine, lumbar paraspinous regions.  Full range of motion of the lumbar spine.  Valente's maneuver, FAIRs maneuver, hip thrust are all negative.  Strength is 5/5 in bilateral lower tremors.  Sensation is intact in the S2 dermatomes.         MEDICAL DECISION MAKING    DATA    Labs:   Lab Results   Component Value Date/Time    SODIUM 137 06/06/2016 03:05 PM    POTASSIUM 4.0 06/06/2016 03:05 PM    CHLORIDE 108 06/06/2016 03:05 PM    CO2 23 06/06/2016 03:05 PM    GLUCOSE 81 06/06/2016 03:05 PM    BUN 15 06/06/2016 03:05 PM    CREATININE 0.82 06/06/2016 03:05 PM    BUNCREATRAT 18 01/06/2016 04:44 PM        No results found for: PROTHROMBTM, INR     Lab Results   Component Value Date/Time    WBC 5.0 07/26/2017 09:26 AM    WBC 5.1 08/09/2016 08:40 AM    RBC 4.99 07/26/2017 09:26 AM    RBC 4.63 08/09/2016 08:40 AM    HEMOGLOBIN 14.6 07/26/2017 09:26 AM    HEMOGLOBIN 8.9 (L) 08/09/2016 08:40 AM    HEMATOCRIT 44.2 07/26/2017 09:26 AM    HEMATOCRIT 30.9 (L) 08/09/2016 08:40 AM    MCV 89 07/26/2017 09:26 AM    MCV 66.7 (L) 08/09/2016 08:40 AM    MCH 29.3 07/26/2017 09:26 AM    MCH 19.2 (L) 08/09/2016 08:40 AM    MCHC 33.0 07/26/2017 09:26 AM    MCHC 28.8 (L) 08/09/2016 08:40 AM    MPV 10.6 08/09/2016 08:40 AM    NEUTSPOLYS 54 07/26/2017 09:26 AM    NEUTSPOLYS 59.40 08/09/2016 08:40 AM    LYMPHOCYTES 37 07/26/2017 09:26 AM    LYMPHOCYTES 28.70 08/09/2016 08:40 AM    MONOCYTES 7 07/26/2017 09:26 AM    MONOCYTES 9.00 08/09/2016 08:40 AM    EOSINOPHILS 2 07/26/2017 09:26 AM    EOSINOPHILS 2.70 08/09/2016 08:40 AM    BASOPHILS 0 07/26/2017 09:26 AM    BASOPHILS 0.20  08/09/2016 08:40 AM    HYPOCHROMIA 1+ 06/06/2016 03:05 PM    ANISOCYTOSIS 1+ 06/06/2016 03:05 PM        No results found for: HBA1C       Imaging: I personally reviewed following images    MRI lumbar spine 7/26/2018  No significant disc herniations at any level.  No high intensity zones.  Mild facet arthropathy bilaterally L5-S1.  Transitional anatomy.  Mild right neuroforaminal stenosis L5-S1.  No central canal stenosis at any level.     X-ray left knee 7/9/2018  Status post ACL reconstruction with screws in place.  Severe osteoarthritis of the all 3 compartments.  Calcification in the expected area of the mid patellar tendon.  There is also a calcification which appears to be intra-articular and approximately 10 mm seen on the lateral view.      I reviewed the following radiology reports                                                                                  MRI lumbar spine 7/26/2018  Borderline right neural foraminal stenosis at L5/S1. No other stenosis is detected    Ligamentum flavum redundancy, facet arthropathy and degenerative disc disease as characterized above          Results for orders placed in visit on 03/08/17   DX-CERVICAL SPINE-2 OR 3 VIEWS    Impression Negative cervical spine.          Results for orders placed in visit on 09/29/15   DX-CHEST-2 VIEWS    Impression No pulmonary consolidation.                                          Results for orders placed in visit on 06/07/18   DX-KNEE COMPLETE 4+ LEFT    Impression 1.  Severe tricompartmental osteoarthritis    2.  Associated intra-articular ossific body    3.  Changes consistent with prior anterior cruciate ligament repair      Results for orders placed in visit on 03/08/17   DX-LUMBAR SPINE-2 OR 3 VIEWS    Impression 1.  No acute lumbar spine fracture or subluxation.    2.  Stable minimal degenerative change at L5-S1.                          Results for orders placed in visit on 09/20/17   DX-SHOULDER 2+ RIGHT    Impression 1. No  acute osseous abnormality.                     DIAGNOSIS   Visit Diagnoses     ICD-10-CM   1. Chronic right SI joint pain M53.3    G89.29   2. Chronic bilateral low back pain without sciatica M54.5    G89.29   3. Arthritis of left knee M17.12   4. S/P ACL reconstruction Z98.890   5. H/O gastric bypass Z98.84   6. Arthritis of right hip M16.11   7. Chronic right-sided low back pain, transitional anatomy lumbar spine M54.5    G89.29         ASSESSMENT and PLAN:     Jen Mejía 47 y.o. female with a history of chronic right sacroiliac pain which responded very well to a fluoroscopically guided injection with significant pain relief and sitting tolerance improved.  Sacroiliac joint maneuvers were negative on exam today.    Bilateral low back pain is likely secondary to mild lumbar spondylosis at the L5-S1 level with transitional anatomy.  Neurologically intact on exam.  Given the improvement sacroiliac pain and likely lumbar spondylosis is chief etiology of pain I would like the patient to go to physical therapy for stretching and strengthening regimen program.  Ideally I would have the patient on anti-inflammatory with meloxicam 15 mg per day for the next 30 days however I will avoid NSAIDs because of history of gastric bypass surgery.  Instead I will have the patient on 1000 mg of Tylenol 3 times daily.    Jen was seen today for follow-up.    Diagnoses and all orders for this visit:    Chronic right SI joint pain  -     acetaminophen (TYLENOL) 500 MG Tab; Take 2 Tabs by mouth 3 times a day as needed (pain.  Do not exceed 3000 mg of total acetaminophen per day).  -     REFERRAL TO PHYSICAL THERAPY Reason for Therapy: Eval/Treat/Report    Chronic bilateral low back pain without sciatica  -     acetaminophen (TYLENOL) 500 MG Tab; Take 2 Tabs by mouth 3 times a day as needed (pain.  Do not exceed 3000 mg of total acetaminophen per day).  -     REFERRAL TO PHYSICAL THERAPY Reason for Therapy:  Eval/Treat/Report    Arthritis of left knee  -     REFERRAL TO PHYSICAL THERAPY Reason for Therapy: Eval/Treat/Report    S/P ACL reconstruction  -     REFERRAL TO PHYSICAL THERAPY Reason for Therapy: Eval/Treat/Report    H/O gastric bypass  -     REFERRAL TO PHYSICAL THERAPY Reason for Therapy: Eval/Treat/Report    Arthritis of right hip  -     REFERRAL TO PHYSICAL THERAPY Reason for Therapy: Eval/Treat/Report    Chronic right-sided low back pain, transitional anatomy lumbar spine  -     acetaminophen (TYLENOL) 500 MG Tab; Take 2 Tabs by mouth 3 times a day as needed (pain.  Do not exceed 3000 mg of total acetaminophen per day).  -     REFERRAL TO PHYSICAL THERAPY Reason for Therapy: Eval/Treat/Report          Follow up: 10/11/2018     Thank you for allowing me to participate in the care of this patient. If you have any questions please not hesitate to contact me.        Please note that this dictation was created using voice recognition software. I have made every reasonable attempt to correct obvious errors but there may be errors of grammar and content that I may have overlooked prior to finalization of this note.      Nikhil Mcbride MD  Interventional Spine and Sports Physiatry  Physical Medicine and Rehabilitation  [facility]  8/1/2018  8:23 AM

## 2018-09-11 ENCOUNTER — OFFICE VISIT (OUTPATIENT)
Dept: MEDICAL GROUP | Facility: PHYSICIAN GROUP | Age: 48
End: 2018-09-11
Payer: COMMERCIAL

## 2018-09-11 VITALS
OXYGEN SATURATION: 97 % | WEIGHT: 225 LBS | HEART RATE: 70 BPM | SYSTOLIC BLOOD PRESSURE: 118 MMHG | HEIGHT: 64 IN | DIASTOLIC BLOOD PRESSURE: 80 MMHG | BODY MASS INDEX: 38.41 KG/M2 | TEMPERATURE: 98.2 F | RESPIRATION RATE: 16 BRPM

## 2018-09-11 DIAGNOSIS — G89.29 CHRONIC PAIN OF LEFT KNEE: ICD-10-CM

## 2018-09-11 DIAGNOSIS — M25.562 CHRONIC PAIN OF LEFT KNEE: ICD-10-CM

## 2018-09-11 DIAGNOSIS — M54.2 NECK PAIN: ICD-10-CM

## 2018-09-11 PROCEDURE — 99213 OFFICE O/P EST LOW 20 MIN: CPT | Performed by: FAMILY MEDICINE

## 2018-09-11 NOTE — LETTER
2018    To whom it may concern,    Jen Mejía  [id number] is my patient in clinic, she has persistent pain in left knee which was injured in a fall in . Please evaluate her xray and reopen her case if appropriate for ongoing treatment of this condition.     Please call me with any questions.         Lemuel Wolfe MD

## 2018-09-11 NOTE — ASSESSMENT & PLAN NOTE
Patient has neck pain, in the center of her back between her shoulders. Painful to touch, feels swollen. She does have spine arthritis. Encouraged to use ice, heat, massage, topical analgesics.

## 2018-09-11 NOTE — PROGRESS NOTES
"Subjective:   Jen Mejía is a 48 y.o. female here today for evaluation and management of:     Chronic pain of left knee    Letter written for her employer's insurance to reopen/reassess this condition.       Neck pain  Patient has neck pain, in the center of her back between her shoulders. Painful to touch, feels swollen. She does have spine arthritis. Encouraged to use ice, heat, massage, topical analgesics.          Current medicines (including changes today)  Current Outpatient Prescriptions   Medication Sig Dispense Refill   • Ferrous Gluconate (IRON) 240 (27 FE) MG Tab Take 1 Tab by mouth every day.     • Calcium-Vitamin D-Vitamin K (CALCIUM SOFT CHEWS PO) Take 1 Tab by mouth every day.     • Probiotic Product (PROBIOTIC DAILY) Cap Take 1 Cap by mouth every day.     • acetaminophen (TYLENOL) 500 MG Tab Take 2 Tabs by mouth 3 times a day as needed (pain.  Do not exceed 3000 mg of total acetaminophen per day). 180 Tab 6     No current facility-administered medications for this visit.      She  has a past medical history of Anemia; Anesthesia; Arthritis; Bowel habit changes; Gynecological disorder; Hemorrhoids; and Obesity.    ROS  No chest pain, no shortness of breath, no abdominal pain       Objective:     Blood pressure 118/80, pulse 70, temperature 36.8 °C (98.2 °F), resp. rate 16, height 1.626 m (5' 4\"), weight 102.1 kg (225 lb), SpO2 97 %. Body mass index is 38.62 kg/m².   Physical Exam:  Constitutional: Alert, no distress.  Skin: Warm, dry, good turgor, no rashes in visible areas.  Eye: Equal, round and reactive, conjunctiva clear, lids normal.  ENMT: Lips without lesions, good dentition, oropharynx clear.  Neck: Trachea midline, no masses, no thyromegaly. No cervical or supraclavicular lymphadenopathy  Respiratory: Unlabored respiratory effort, lungs clear to auscultation, no wheezes, no ronchi.  Cardiovascular: Normal S1, S2, no murmur, no edema.  Abdomen: Soft, non-tender, no masses, no " hepatosplenomegaly.  Psych: Alert and oriented x3, normal affect and mood.  Neck: no redness, swelling, erythema on neck or upper back.       Assessment and Plan:   The following treatment plan was discussed    1. Chronic pain of left knee  Letter written so this can be evaluated.     2. Neck pain  Chronic condition, advised on heat, ice, gentle stretches.       Followup: No Follow-up on file.

## 2018-10-11 ENCOUNTER — APPOINTMENT (OUTPATIENT)
Dept: PHYSICAL MEDICINE AND REHAB | Facility: MEDICAL CENTER | Age: 48
End: 2018-10-11
Payer: COMMERCIAL

## 2018-10-30 ENCOUNTER — HOSPITAL ENCOUNTER (OUTPATIENT)
Dept: LAB | Facility: MEDICAL CENTER | Age: 48
End: 2018-10-30
Attending: FAMILY MEDICINE
Payer: COMMERCIAL

## 2018-10-30 DIAGNOSIS — D50.8 IRON DEFICIENCY ANEMIA SECONDARY TO INADEQUATE DIETARY IRON INTAKE: ICD-10-CM

## 2018-10-30 DIAGNOSIS — R68.89 FLU-LIKE SYMPTOMS: ICD-10-CM

## 2018-10-30 LAB
ALBUMIN SERPL BCP-MCNC: 4.2 G/DL (ref 3.2–4.9)
ALBUMIN/GLOB SERPL: 1.4 G/DL
ALP SERPL-CCNC: 76 U/L (ref 30–99)
ALT SERPL-CCNC: 15 U/L (ref 2–50)
ANION GAP SERPL CALC-SCNC: 6 MMOL/L (ref 0–11.9)
AST SERPL-CCNC: 17 U/L (ref 12–45)
BILIRUB SERPL-MCNC: 0.7 MG/DL (ref 0.1–1.5)
BUN SERPL-MCNC: 11 MG/DL (ref 8–22)
CALCIUM SERPL-MCNC: 9.7 MG/DL (ref 8.5–10.5)
CHLORIDE SERPL-SCNC: 105 MMOL/L (ref 96–112)
CO2 SERPL-SCNC: 28 MMOL/L (ref 20–33)
CREAT SERPL-MCNC: 0.73 MG/DL (ref 0.5–1.4)
ERYTHROCYTE [DISTWIDTH] IN BLOOD BY AUTOMATED COUNT: 43.4 FL (ref 35.9–50)
FERRITIN SERPL-MCNC: 12.8 NG/ML (ref 10–291)
GLOBULIN SER CALC-MCNC: 2.9 G/DL (ref 1.9–3.5)
GLUCOSE SERPL-MCNC: 76 MG/DL (ref 65–99)
HCT VFR BLD AUTO: 43.9 % (ref 37–47)
HGB BLD-MCNC: 14.2 G/DL (ref 12–16)
IRON SERPL-MCNC: 120 UG/DL (ref 40–170)
MCH RBC QN AUTO: 28.7 PG (ref 27–33)
MCHC RBC AUTO-ENTMCNC: 32.3 G/DL (ref 33.6–35)
MCV RBC AUTO: 88.9 FL (ref 81.4–97.8)
PLATELET # BLD AUTO: 187 K/UL (ref 164–446)
PMV BLD AUTO: 12.2 FL (ref 9–12.9)
POTASSIUM SERPL-SCNC: 3.8 MMOL/L (ref 3.6–5.5)
PROT SERPL-MCNC: 7.1 G/DL (ref 6–8.2)
RBC # BLD AUTO: 4.94 M/UL (ref 4.2–5.4)
SODIUM SERPL-SCNC: 139 MMOL/L (ref 135–145)
WBC # BLD AUTO: 5.8 K/UL (ref 4.8–10.8)

## 2018-10-30 PROCEDURE — 36415 COLL VENOUS BLD VENIPUNCTURE: CPT

## 2018-10-30 PROCEDURE — 82728 ASSAY OF FERRITIN: CPT

## 2018-10-30 PROCEDURE — 83540 ASSAY OF IRON: CPT

## 2018-10-30 PROCEDURE — 85027 COMPLETE CBC AUTOMATED: CPT

## 2018-10-30 PROCEDURE — 80053 COMPREHEN METABOLIC PANEL: CPT

## 2018-11-08 ENCOUNTER — OFFICE VISIT (OUTPATIENT)
Dept: MEDICAL GROUP | Facility: PHYSICIAN GROUP | Age: 48
End: 2018-11-08
Payer: COMMERCIAL

## 2018-11-08 VITALS
BODY MASS INDEX: 38.24 KG/M2 | OXYGEN SATURATION: 99 % | WEIGHT: 224 LBS | TEMPERATURE: 98.3 F | RESPIRATION RATE: 16 BRPM | DIASTOLIC BLOOD PRESSURE: 74 MMHG | HEART RATE: 66 BPM | SYSTOLIC BLOOD PRESSURE: 120 MMHG | HEIGHT: 64 IN

## 2018-11-08 DIAGNOSIS — D50.8 IRON DEFICIENCY ANEMIA SECONDARY TO INADEQUATE DIETARY IRON INTAKE: ICD-10-CM

## 2018-11-08 DIAGNOSIS — Z23 NEED FOR VACCINATION: ICD-10-CM

## 2018-11-08 DIAGNOSIS — M51.36 LUMBAR DEGENERATIVE DISC DISEASE: ICD-10-CM

## 2018-11-08 PROCEDURE — 90686 IIV4 VACC NO PRSV 0.5 ML IM: CPT | Performed by: FAMILY MEDICINE

## 2018-11-08 PROCEDURE — 90471 IMMUNIZATION ADMIN: CPT | Performed by: FAMILY MEDICINE

## 2018-11-08 PROCEDURE — 99213 OFFICE O/P EST LOW 20 MIN: CPT | Mod: 25 | Performed by: FAMILY MEDICINE

## 2018-11-08 NOTE — PROGRESS NOTES
"Subjective:   Jen Mejía is a 48 y.o. female here today for evaluation and management of:     Iron deficiency anemia secondary to inadequate dietary iron intake  Normal Ferritin at recent labs.   Continues on iron supplement.   H/H stable and in normal range.     Lumbar degenerative disc disease  Chronic low back pain, she is followed by pain management. She is on Tylenol 1000mg TID. Normal LFTS reviewed.     She is going to get set up with a knee specialist. I recommended Dr. Josue Rosenberg at [facility].         Current medicines (including changes today)  Current Outpatient Prescriptions   Medication Sig Dispense Refill   • acetaminophen (TYLENOL) 500 MG Tab Take 2 Tabs by mouth 3 times a day as needed (pain.  Do not exceed 3000 mg of total acetaminophen per day). 180 Tab 6   • Ferrous Gluconate (IRON) 240 (27 FE) MG Tab Take 1 Tab by mouth every day.     • Calcium-Vitamin D-Vitamin K (CALCIUM SOFT CHEWS PO) Take 1 Tab by mouth every day.     • Probiotic Product (PROBIOTIC DAILY) Cap Take 1 Cap by mouth every day.       No current facility-administered medications for this visit.      She  has a past medical history of Anemia; Anesthesia; Arthritis; Bowel habit changes; Gynecological disorder; Hemorrhoids; and Obesity.    ROS  No chest pain, no shortness of breath, no abdominal pain       Objective:     Blood pressure 120/74, pulse 66, temperature 36.8 °C (98.3 °F), temperature source Temporal, resp. rate 16, height 1.626 m (5' 4\"), weight 101.6 kg (224 lb), SpO2 99 %, not currently breastfeeding. Body mass index is 38.45 kg/m².   Physical Exam:  Constitutional: Alert, no distress.  Skin: Warm, dry, good turgor, no rashes in visible areas.  Eye: Equal, round and reactive, conjunctiva clear, lids normal.  ENMT: Lips without lesions, good dentition, oropharynx clear.  Neck: Trachea midline, no masses, no thyromegaly. No cervical or supraclavicular lymphadenopathy  Respiratory: Unlabored respiratory effort, lungs " clear to auscultation, no wheezes, no ronchi.  Cardiovascular: Normal S1, S2, no murmur, no edema.  Abdomen: Soft, non-tender, no masses, no hepatosplenomegaly.  Psych: Alert and oriented x3, normal affect and mood.        Assessment and Plan:   The following treatment plan was discussed    1. Iron deficiency anemia secondary to inadequate dietary iron intake  Stable, continue iron supplement.     2. Lumbar degenerative disc disease  Stable follow with pain management.       Followup: No Follow-up on file.

## 2018-11-08 NOTE — ASSESSMENT & PLAN NOTE
Chronic low back pain, she is followed by pain management. She is on Tylenol 1000mg TID. Normal LFTS reviewed.

## 2018-11-08 NOTE — ASSESSMENT & PLAN NOTE
Normal Ferritin at recent labs.   Continues on iron supplement.   H/H stable and in normal range.

## 2019-08-27 ENCOUNTER — HOSPITAL ENCOUNTER (OUTPATIENT)
Dept: RADIOLOGY | Facility: MEDICAL CENTER | Age: 49
End: 2019-08-27
Attending: FAMILY MEDICINE
Payer: COMMERCIAL

## 2019-08-27 DIAGNOSIS — Z12.31 VISIT FOR SCREENING MAMMOGRAM: ICD-10-CM

## 2019-08-27 PROCEDURE — 77067 SCR MAMMO BI INCL CAD: CPT

## 2020-09-01 ENCOUNTER — APPOINTMENT (OUTPATIENT)
Dept: RADIOLOGY | Facility: MEDICAL CENTER | Age: 50
End: 2020-09-01
Attending: FAMILY MEDICINE
Payer: COMMERCIAL

## 2020-09-01 DIAGNOSIS — Z12.31 VISIT FOR SCREENING MAMMOGRAM: ICD-10-CM

## 2020-09-02 ENCOUNTER — HOSPITAL ENCOUNTER (OUTPATIENT)
Dept: RADIOLOGY | Facility: MEDICAL CENTER | Age: 50
End: 2020-09-02
Attending: FAMILY MEDICINE
Payer: COMMERCIAL

## 2020-09-02 DIAGNOSIS — Z12.31 VISIT FOR SCREENING MAMMOGRAM: ICD-10-CM

## 2020-09-02 PROCEDURE — 77067 SCR MAMMO BI INCL CAD: CPT

## 2020-11-25 ENCOUNTER — OFFICE VISIT (OUTPATIENT)
Dept: URGENT CARE | Facility: PHYSICIAN GROUP | Age: 50
End: 2020-11-25
Payer: COMMERCIAL

## 2020-11-25 ENCOUNTER — HOSPITAL ENCOUNTER (OUTPATIENT)
Facility: MEDICAL CENTER | Age: 50
End: 2020-11-25
Attending: NURSE PRACTITIONER
Payer: COMMERCIAL

## 2020-11-25 VITALS
HEIGHT: 64 IN | TEMPERATURE: 98.1 F | WEIGHT: 226 LBS | DIASTOLIC BLOOD PRESSURE: 88 MMHG | SYSTOLIC BLOOD PRESSURE: 114 MMHG | BODY MASS INDEX: 38.58 KG/M2 | OXYGEN SATURATION: 96 % | HEART RATE: 68 BPM | RESPIRATION RATE: 16 BRPM

## 2020-11-25 DIAGNOSIS — R52 GENERALIZED BODY ACHES: ICD-10-CM

## 2020-11-25 DIAGNOSIS — Z20.822 EXPOSURE TO COVID-19 VIRUS: Primary | ICD-10-CM

## 2020-11-25 DIAGNOSIS — Z20.822 EXPOSURE TO COVID-19 VIRUS: ICD-10-CM

## 2020-11-25 DIAGNOSIS — R51.9 GENERALIZED HEADACHES: ICD-10-CM

## 2020-11-25 DIAGNOSIS — R21 RASH AND NONSPECIFIC SKIN ERUPTION: ICD-10-CM

## 2020-11-25 LAB — COVID ORDER STATUS COVID19: NORMAL

## 2020-11-25 PROCEDURE — 99214 OFFICE O/P EST MOD 30 MIN: CPT | Mod: CS | Performed by: NURSE PRACTITIONER

## 2020-11-25 PROCEDURE — U0003 INFECTIOUS AGENT DETECTION BY NUCLEIC ACID (DNA OR RNA); SEVERE ACUTE RESPIRATORY SYNDROME CORONAVIRUS 2 (SARS-COV-2) (CORONAVIRUS DISEASE [COVID-19]), AMPLIFIED PROBE TECHNIQUE, MAKING USE OF HIGH THROUGHPUT TECHNOLOGIES AS DESCRIBED BY CMS-2020-01-R: HCPCS

## 2020-11-25 RX ORDER — M-VIT,TX,IRON,MINS/CALC/FOLIC 27MG-0.4MG
1 TABLET ORAL DAILY
COMMUNITY
End: 2022-10-25

## 2020-11-25 ASSESSMENT — ENCOUNTER SYMPTOMS
CHILLS: 0
FEVER: 0
CHANGE IN BOWEL HABIT: 0
BODY ACHES: 1
SORE THROAT: 1
COUGH: 0
WHEEZING: 0
NECK PAIN: 0
NUMBNESS: 0
SHORTNESS OF BREATH: 0
VERTIGO: 0
PALPITATIONS: 0
ORTHOPNEA: 0
MEMORY LOSS: 0
FATIGUE: 1
VISUAL CHANGE: 0
SINUS PAIN: 0
RHINORRHEA: 0
CONSTIPATION: 0
HEARTBURN: 0
DIARRHEA: 0
ANOREXIA: 0
VOMITING: 0
HEADACHES: 1
NAUSEA: 0
MYALGIAS: 1

## 2020-11-25 NOTE — PROGRESS NOTES
Subjective:      Jen Mejía is a 50 y.o. female who presents with Headache (x3days ) and Rash (on neck and gradually spreading down to chest. x1day )        Patient reports multiple positive Covid exposures at work at her job at [employer]. Denies N/V/D, change in sense of taste or smell.      Rash  This is a new problem. The current episode started yesterday. The problem has been gradually worsening since onset. The affected locations include the neck and chest. The rash is characterized by burning and redness. She was exposed to nothing. Associated symptoms include fatigue and a sore throat. Pertinent negatives include no anorexia, congestion, cough, diarrhea, facial edema, fever, joint pain, rhinorrhea, shortness of breath or vomiting. Past treatments include nothing. The treatment provided no relief.   Generalized Body Aches  This is a new problem. Episode onset: In the past 3 days. Associated symptoms include fatigue, headaches, myalgias, a rash and a sore throat. Pertinent negatives include no anorexia, change in bowel habit, chest pain, chills, congestion, coughing, fever, nausea, neck pain, numbness, urinary symptoms, vertigo, visual change or vomiting.       Review of Systems   Constitutional: Positive for fatigue and malaise/fatigue. Negative for chills and fever.   HENT: Positive for sore throat. Negative for congestion, rhinorrhea and sinus pain.    Respiratory: Negative for cough, shortness of breath and wheezing.    Cardiovascular: Negative for chest pain, palpitations, orthopnea and leg swelling.   Gastrointestinal: Negative for anorexia, change in bowel habit, constipation, diarrhea, heartburn, nausea and vomiting.   Musculoskeletal: Positive for myalgias. Negative for joint pain and neck pain.   Skin: Positive for rash.   Neurological: Positive for headaches. Negative for vertigo and numbness.   Psychiatric/Behavioral: Negative for memory loss and suicidal ideas.   All other systems reviewed and  "are negative.         Objective:     /88   Pulse 68   Temp 36.7 °C (98.1 °F) (Temporal)   Resp 16   Ht 1.626 m (5' 4\")   Wt 102.5 kg (226 lb)   SpO2 96%   BMI 38.79 kg/m²      Physical Exam  Vitals signs reviewed.   Constitutional:       General: She is not in acute distress.     Appearance: Normal appearance. She is not ill-appearing.   HENT:      Head: Normocephalic.      Right Ear: Tympanic membrane, ear canal and external ear normal.      Left Ear: Tympanic membrane, ear canal and external ear normal.      Nose: Nose normal. No congestion.      Mouth/Throat:      Lips: Pink.      Mouth: Mucous membranes are moist.      Pharynx: Uvula midline. Posterior oropharyngeal erythema present. No pharyngeal swelling, oropharyngeal exudate or uvula swelling.   Eyes:      General: No visual field deficit.     Extraocular Movements: Extraocular movements intact.      Conjunctiva/sclera: Conjunctivae normal.      Pupils: Pupils are equal, round, and reactive to light.   Neck:      Musculoskeletal: Normal range of motion and neck supple. No muscular tenderness.   Cardiovascular:      Rate and Rhythm: Normal rate and regular rhythm.      Pulses: Normal pulses.      Heart sounds: Normal heart sounds. No murmur.   Pulmonary:      Effort: Pulmonary effort is normal. No respiratory distress.      Breath sounds: Normal breath sounds. No wheezing, rhonchi or rales.   Abdominal:      Palpations: Abdomen is soft.   Musculoskeletal: Normal range of motion.   Lymphadenopathy:      Cervical: No cervical adenopathy.   Skin:     General: Skin is warm and dry.      Capillary Refill: Capillary refill takes less than 2 seconds.      Findings: Erythema and rash ( ) present. Rash is macular.          Neurological:      Mental Status: She is alert and oriented to person, place, and time.      GCS: GCS eye subscore is 4. GCS verbal subscore is 5. GCS motor subscore is 6.      Cranial Nerves: Cranial nerves are intact. No cranial nerve " deficit, dysarthria or facial asymmetry.      Sensory: Sensation is intact.      Motor: Motor function is intact.      Coordination: Coordination is intact.      Gait: Gait is intact.   Psychiatric:         Mood and Affect: Mood normal.         Behavior: Behavior normal.                 Assessment/Plan:        1. Exposure to COVID-19 virus  COVID/SARS COV-2 PCR   2. Generalized body aches  COVID/SARS COV-2 PCR   3. Rash and nonspecific skin eruption  COVID/SARS COV-2 PCR   4. Generalized headaches  COVID/SARS COV-2 PCR       May take over-the-counter Ibuprofen 400-600 mg every 8 hours as needed for pain    Await Covid results.    Discussed with patient that symptoms are suspicious for Covid-19 infection vs other viral illness.  Vitals are stable at this time.  Patient is advised to self isolate and provided with self isolation precautions AVS information.  Discussed when to return to urgent care or ER including for worsening shortness of breath.  Patient verbalized understanding and has no additional questions.    Plan of care, medications and treatments reviewed with patient and or guardian.  Patient and or guardian voices understanding and agrees with the instructions provided. After visit summary reviewed with patient. Patient and or guardian understand the parameters for reevaluation and ER precautions discussed.     Please note that this dictation was created using voice recognition software. I have made every reasonable attempt to correct obvious errors, but I expect that there are errors of grammar and possibly content that I did not discover before finalizing the note.

## 2020-11-26 LAB
SARS-COV-2 RNA RESP QL NAA+PROBE: NOTDETECTED
SPECIMEN SOURCE: NORMAL

## 2021-01-22 ENCOUNTER — TELEMEDICINE (OUTPATIENT)
Dept: MEDICAL GROUP | Facility: PHYSICIAN GROUP | Age: 51
End: 2021-01-22
Payer: COMMERCIAL

## 2021-01-22 VITALS — BODY MASS INDEX: 37.56 KG/M2 | WEIGHT: 220 LBS | HEIGHT: 64 IN

## 2021-01-22 DIAGNOSIS — M51.36 LUMBAR DEGENERATIVE DISC DISEASE: ICD-10-CM

## 2021-01-22 DIAGNOSIS — E55.9 VITAMIN D DEFICIENCY: ICD-10-CM

## 2021-01-22 DIAGNOSIS — M54.2 NECK PAIN: ICD-10-CM

## 2021-01-22 DIAGNOSIS — E78.5 DYSLIPIDEMIA: ICD-10-CM

## 2021-01-22 DIAGNOSIS — E63.9 NUTRITIONAL DEFICIENCY: ICD-10-CM

## 2021-01-22 DIAGNOSIS — E66.9 OBESITY (BMI 30-39.9): ICD-10-CM

## 2021-01-22 PROCEDURE — 99214 OFFICE O/P EST MOD 30 MIN: CPT | Mod: 95,CR | Performed by: FAMILY MEDICINE

## 2021-01-22 RX ORDER — TIZANIDINE 4 MG/1
4 TABLET ORAL EVERY 6 HOURS PRN
Qty: 30 TAB | Refills: 3 | Status: SHIPPED | OUTPATIENT
Start: 2021-01-22 | End: 2022-10-25

## 2021-01-22 ASSESSMENT — PATIENT HEALTH QUESTIONNAIRE - PHQ9: CLINICAL INTERPRETATION OF PHQ2 SCORE: 0

## 2021-01-22 NOTE — PROGRESS NOTES
Virtual Visit: Established Patient   This visit was conducted via Zoom using secure and encrypted videoconferencing technology. The patient was in a private location in the state of [place].    The patient's identity was confirmed and verbal consent was obtained for this virtual visit.    Subjective:   CC:   Chief Complaint   Patient presents with   • Neck Pain     x few months   • Back Pain     x few months       Jen Mejía is a 50 y.o. female presenting for evaluation and management of:    Neck pain  Chronic neck pain. Radiating to her right shoulder. Pain worsening. In 2017 xray of cervical spine was normal.   Encouraged to use ice, heat, massage, ibuprofen, tylenol.    will need MRI to evaluate for cervical radiculopathy as pain is now waking her at night and worsening.    Both hands with numbness and tingling and dropping objects due to decreased  strength.     Lumbar degenerative disc disease  Chronic low back pain. Pt broke her tail bone during childbirth years ago.   Xray results 2017  3/8/2017 6:26 PM     HISTORY/REASON FOR EXAM:  Chronic low back pain.        TECHNIQUE/ EXAM DESCRIPTION AND NUMBER OF VIEWS:  3 views of the lumbar spine.     COMPARISON: 7/3/2014     FINDINGS:  The alignment of the lumbar spine is normal.  The vertebral body heights are maintained.     Minimal disc space narrowing at L5-S1 is stable.  There is no significant facet arthropathy.  There is no acute abnormality.  The SI joints appear normal.     IMPRESSION:     1.  No acute lumbar spine fracture or subluxation.     2.  Stable minimal degenerative change at L5-S1.      ROS   Denies any recent fevers or chills. No nausea or vomiting. No chest pains or shortness of breath.     Allergies   Allergen Reactions   • Amoxicillin      rashes   • Penicillins      rashes   • Tetracycline      rashes   • Augmentin Rash     .       Current medicines (including changes today)  Current Outpatient Medications   Medication Sig Dispense  "Refill   • tizanidine (ZANAFLEX) 4 MG Tab Take 1 Tab by mouth every 6 hours as needed. 30 Tab 3   • therapeutic multivitamin-minerals (THERAGRAN-M) Tab Take 1 Tab by mouth every day.     • acetaminophen (TYLENOL) 500 MG Tab Take 2 Tabs by mouth 3 times a day as needed (pain.  Do not exceed 3000 mg of total acetaminophen per day). 180 Tab 6   • Calcium-Vitamin D-Vitamin K (CALCIUM SOFT CHEWS PO) Take 1 Tab by mouth every day.     • Probiotic Product (PROBIOTIC DAILY) Cap Take 1 Cap by mouth every day.       No current facility-administered medications for this visit.        Patient Active Problem List    Diagnosis Date Noted   • Chronic pain of left knee 09/11/2018   • Decreased hearing of both ears 06/04/2018   • Flu-like symptoms 04/12/2018   • Chronic right shoulder pain 04/12/2018   • Obesity (BMI 30-39.9) 01/17/2018   • Migraine without aura and without status migrainosus, not intractable 09/20/2017   • Lumbar degenerative disc disease 03/08/2017   • Neck pain 03/08/2017   • Iron deficiency anemia secondary to inadequate dietary iron intake 03/08/2017   • Status post hysterectomy 06/23/2016   • Slow transit constipation 06/23/2016   • S/P gastric bypass 12/21/2015   • Nutritional deficiency 12/21/2015   • Non morbid obesity due to excess calories 10/12/2015   • Low back pain 07/02/2014   • Obesity 07/02/2014       Family History   Problem Relation Age of Onset   • Diabetes Father        She  has a past medical history of Anemia, Anesthesia, Arthritis, Bowel habit changes, Gynecological disorder, Hemorrhoids, and Obesity.  She  has a past surgical history that includes cholecystectomy; tonsillectomy; acl reconstruction; knee arthroscopy; gastric bypass laparoscopic (10/12/2015); us-needle core bx-breast panel; vaginal hysterectomy scope total (6/9/2016); and cystoscopy (N/A, 6/9/2016).       Objective:   Ht 1.626 m (5' 4\")   Wt 99.8 kg (220 lb)   BMI 37.76 kg/m²     Physical Exam:  Constitutional: Alert, no " distress, well-groomed.  Skin: No rashes in visible areas.  Eye: Round. Conjunctiva clear, lids normal. No icterus.   ENMT: Lips pink without lesions, good dentition, moist mucous membranes. Phonation normal.  Neck: No masses, no thyromegaly. Moves freely without pain.  Respiratory: Unlabored respiratory effort, no cough or audible wheeze  Psych: Alert and oriented x3, normal affect and mood.       Assessment and Plan:   The following treatment plan was discussed:     1. Neck pain  - MR-CERVICAL SPINE-W/O; Future    2. Lumbar degenerative disc disease    3. Obesity (BMI 30-39.9)  - Patient identified as having weight management issue.  Appropriate orders and counseling given.    4. Nutritional deficiency  - Comp Metabolic Panel; Future    5. Dyslipidemia  - Lipid Profile; Future    6. Vitamin D deficiency  - VITAMIN D,25 HYDROXY; Future    Other orders  - tizanidine (ZANAFLEX) 4 MG Tab; Take 1 Tab by mouth every 6 hours as needed.  Dispense: 30 Tab; Refill: 3        Follow-up: Return in about 3 months (around 4/22/2021) for neck pain, low back pain, lab results. .

## 2021-01-22 NOTE — ASSESSMENT & PLAN NOTE
Chronic neck pain. Radiating to her right shoulder. Pain worsening. In 2017 xray of cervical spine was normal.   Encouraged to use ice, heat, massage, ibuprofen, tylenol.    will need MRI to evaluate for cervical radiculopathy as pain is now waking her at night and worsening.    Both hands with numbness and tingling and dropping objects due to decreased  strength.

## 2021-01-22 NOTE — ASSESSMENT & PLAN NOTE
Chronic low back pain. Pt broke her tail bone during childbirth years ago.   Xray results 2017  3/8/2017 6:26 PM     HISTORY/REASON FOR EXAM:  Chronic low back pain.        TECHNIQUE/ EXAM DESCRIPTION AND NUMBER OF VIEWS:  3 views of the lumbar spine.     COMPARISON: 7/3/2014     FINDINGS:  The alignment of the lumbar spine is normal.  The vertebral body heights are maintained.     Minimal disc space narrowing at L5-S1 is stable.  There is no significant facet arthropathy.  There is no acute abnormality.  The SI joints appear normal.     IMPRESSION:     1.  No acute lumbar spine fracture or subluxation.     2.  Stable minimal degenerative change at L5-S1.

## 2021-02-09 ENCOUNTER — HOSPITAL ENCOUNTER (OUTPATIENT)
Dept: RADIOLOGY | Facility: MEDICAL CENTER | Age: 51
End: 2021-02-09
Attending: FAMILY MEDICINE
Payer: COMMERCIAL

## 2021-02-09 DIAGNOSIS — M54.2 NECK PAIN: ICD-10-CM

## 2021-02-09 PROCEDURE — 72141 MRI NECK SPINE W/O DYE: CPT

## 2021-02-16 DIAGNOSIS — M54.2 NECK PAIN: ICD-10-CM

## 2021-02-16 DIAGNOSIS — M50.122 CERVICAL DISC DISORDER AT C5-C6 LEVEL WITH RADICULOPATHY: ICD-10-CM

## 2021-02-16 DIAGNOSIS — M51.36 LUMBAR DEGENERATIVE DISC DISEASE: ICD-10-CM

## 2021-02-16 NOTE — RESULT ENCOUNTER NOTE
Released to [facility],  Your neck MRI was normal!  Lemuel Wolfe M.D.

## 2021-06-13 ENCOUNTER — OFFICE VISIT (OUTPATIENT)
Dept: URGENT CARE | Facility: PHYSICIAN GROUP | Age: 51
End: 2021-06-13
Payer: COMMERCIAL

## 2021-06-13 VITALS
HEIGHT: 64 IN | SYSTOLIC BLOOD PRESSURE: 118 MMHG | HEART RATE: 90 BPM | OXYGEN SATURATION: 99 % | TEMPERATURE: 98.6 F | WEIGHT: 220 LBS | RESPIRATION RATE: 16 BRPM | BODY MASS INDEX: 37.56 KG/M2 | DIASTOLIC BLOOD PRESSURE: 74 MMHG

## 2021-06-13 DIAGNOSIS — M79.672 FOOT PAIN, LEFT: ICD-10-CM

## 2021-06-13 PROCEDURE — 99214 OFFICE O/P EST MOD 30 MIN: CPT | Performed by: PHYSICIAN ASSISTANT

## 2021-06-13 RX ORDER — DEXAMETHASONE 4 MG/1
10 TABLET ORAL DAILY
Qty: 8 TABLET | Refills: 0 | Status: SHIPPED | OUTPATIENT
Start: 2021-06-13 | End: 2021-06-16

## 2021-06-13 NOTE — PROGRESS NOTES
Chief Complaint   Patient presents with   • Foot Problem     x LT foot pain        HISTORY OF PRESENT ILLNESS: Patient is a 50 y.o. female who presents today for the following:    Patient comes in for evaluation of left foot pain.  She was involved in an event where she was on her feet and her foot started hurting the next day.  She states the pain wakes her at night but is surprisingly much improved in the morning, worsening as the day goes on.  She denies specific injury and has not noticed any soft tissue swelling, ecchymosis, or deformity.    Patient Active Problem List    Diagnosis Date Noted   • Chronic pain of left knee 09/11/2018   • Decreased hearing of both ears 06/04/2018   • Flu-like symptoms 04/12/2018   • Chronic right shoulder pain 04/12/2018   • Obesity (BMI 30-39.9) 01/17/2018   • Migraine without aura and without status migrainosus, not intractable 09/20/2017   • Lumbar degenerative disc disease 03/08/2017   • Neck pain 03/08/2017   • Iron deficiency anemia secondary to inadequate dietary iron intake 03/08/2017   • Status post hysterectomy 06/23/2016   • Slow transit constipation 06/23/2016   • S/P gastric bypass 12/21/2015   • Nutritional deficiency 12/21/2015   • Non morbid obesity due to excess calories 10/12/2015   • Low back pain 07/02/2014   • Obesity 07/02/2014       Allergies:Amoxicillin, Penicillins, Tetracycline, and Augmentin    Current Outpatient Medications Ordered in Epic   Medication Sig Dispense Refill   • dexamethasone (DECADRON) 4 MG Tab Take 2.5 Tablets by mouth every day for 3 days. 8 tablet 0   • tizanidine (ZANAFLEX) 4 MG Tab Take 1 Tab by mouth every 6 hours as needed. 30 Tab 3   • therapeutic multivitamin-minerals (THERAGRAN-M) Tab Take 1 Tab by mouth every day.     • acetaminophen (TYLENOL) 500 MG Tab Take 2 Tabs by mouth 3 times a day as needed (pain.  Do not exceed 3000 mg of total acetaminophen per day). 180 Tab 6   • Calcium-Vitamin D-Vitamin K (CALCIUM SOFT CHEWS PO)  "Take 1 Tab by mouth every day.     • Probiotic Product (PROBIOTIC DAILY) Cap Take 1 Cap by mouth every day.       No current [facility]-ordered facility-administered medications on file.       Past Medical History:   Diagnosis Date   • Anemia    • Anesthesia     nausea   • Arthritis     deg disc disease lower back   • Bowel habit changes     constipation   • Gynecological disorder     fibroids, irreg periods   • Hemorrhoids    • Obesity        Social History     Tobacco Use   • Smoking status: Never Smoker   • Smokeless tobacco: Never Used   Vaping Use   • Vaping Use: Never used   Substance Use Topics   • Alcohol use: No     Alcohol/week: 0.0 oz   • Drug use: No       Family Status   Relation Name Status   • Mo  Alive   • Fa  Alive     Family History   Problem Relation Age of Onset   • Diabetes Father        Review of Systems:   Constitutional ROS: No unexpected change in weight, No weakness, No fatigue  Pulmonary ROS: No chronic cough, sputum, or hemoptysis, No dyspnea on exertion, No wheezing  Cardiovascular ROS: No diaphoresis, No edema, No palpitations  Musculoskeletal/Extremities ROS: Left foot pain.  Hematologic/Lymphatic ROS: No chills, No night sweats, No weight loss  Skin/Integumentary ROS: No edema, No evidence of rash, No itching      Exam:  /74   Pulse 90   Temp 37 °C (98.6 °F) (Temporal)   Resp 16   Ht 1.626 m (5' 4\")   Wt 99.8 kg (220 lb)   SpO2 99%   General: Well developed, well nourished. No distress.    HENT: Head is grossly normal.  Pulmonary: Unlabored respiratory effort.   Neurologic: Grossly nonfocal. No facial asymmetry noted.  Musculoskeletal: Mild tenderness noted in the left midfoot, dorsal aspect, without soft tissue swelling, ecchymosis, or deformity.  Strong left pedal pulse.  Skin: Warm, dry, good turgor. No rashes in visible areas.   Psych: Normal mood. Alert and oriented to person, place and time.    Assessment/Plan:  Low suspicion for fracture.  Starting steroids.  Patient did " not like the way the Medrol Dosepak made her feel therefore dosing dexamethasone.  Patient to have the x-ray performed as an outpatient if symptoms do not prove with the steroids.  Discussed appropriate over-the-counter symptomatic medication, and when to return to clinic. Follow up for worsening or persistent symptoms.  1. Foot pain, left  dexamethasone (DECADRON) 4 MG Tab    DX-FOOT-COMPLETE 3+ LEFT

## 2021-10-11 ENCOUNTER — OFFICE VISIT (OUTPATIENT)
Dept: URGENT CARE | Facility: PHYSICIAN GROUP | Age: 51
End: 2021-10-11
Payer: COMMERCIAL

## 2021-10-11 VITALS
TEMPERATURE: 97.2 F | RESPIRATION RATE: 16 BRPM | OXYGEN SATURATION: 98 % | SYSTOLIC BLOOD PRESSURE: 108 MMHG | HEART RATE: 71 BPM | BODY MASS INDEX: 40.8 KG/M2 | HEIGHT: 64 IN | DIASTOLIC BLOOD PRESSURE: 78 MMHG | WEIGHT: 239 LBS

## 2021-10-11 DIAGNOSIS — H01.119 EYELID DERMATITIS, ALLERGIC/CONTACT: ICD-10-CM

## 2021-10-11 PROBLEM — L30.9 DERMATITIS: Status: ACTIVE | Noted: 2021-10-11

## 2021-10-11 PROCEDURE — 99213 OFFICE O/P EST LOW 20 MIN: CPT | Performed by: NURSE PRACTITIONER

## 2021-10-11 RX ORDER — PREDNISONE 20 MG/1
TABLET ORAL
Qty: 6 TABLET | Refills: 0 | Status: SHIPPED | OUTPATIENT
Start: 2021-10-11 | End: 2022-10-25

## 2021-10-11 RX ORDER — HYDROCODONE BITARTRATE AND ACETAMINOPHEN 5; 325 MG/1; MG/1
1 TABLET ORAL EVERY 4 HOURS PRN
COMMUNITY
Start: 2021-09-23 | End: 2021-10-11

## 2021-10-11 RX ORDER — AZITHROMYCIN 250 MG/1
TABLET, FILM COATED ORAL
COMMUNITY
Start: 2021-10-02 | End: 2021-10-11

## 2021-10-11 RX ORDER — CLINDAMYCIN HYDROCHLORIDE 150 MG/1
CAPSULE ORAL
COMMUNITY
Start: 2021-09-23 | End: 2021-10-11

## 2021-10-11 ASSESSMENT — ENCOUNTER SYMPTOMS
EYE DISCHARGE: 0
DOUBLE VISION: 0
BLURRED VISION: 0
NAUSEA: 0
CHILLS: 0
FEVER: 0
MYALGIAS: 0
HEADACHES: 0
EYE REDNESS: 0

## 2021-10-11 NOTE — PROGRESS NOTES
Subjective     Jen Mejía is a 51 y.o. female who presents with Rash (Bilateral eyes, [id number] )            HPI   New problem.  Patient is a very pleasant 51-year-old female presents with rash to bilateral upper eyelids and below her eyes x1 month.  She states the rash is a burning sensation and red.  She denies the use of any new skin care products and she has no prior history of having any skin issues.  She has been using any medications for this at this time.  Amoxicillin, Penicillins, Tetracycline, and Augmentin  Current Outpatient Medications on File Prior to Visit   Medication Sig Dispense Refill   • tizanidine (ZANAFLEX) 4 MG Tab Take 1 Tab by mouth every 6 hours as needed. 30 Tab 3   • therapeutic multivitamin-minerals (THERAGRAN-M) Tab Take 1 Tab by mouth every day.     • Calcium-Vitamin D-Vitamin K (CALCIUM SOFT CHEWS PO) Take 1 Tab by mouth every day.     • Probiotic Product (PROBIOTIC DAILY) Cap Take 1 Cap by mouth every day.       No current facility-administered medications on file prior to visit.     Social History     Socioeconomic History   • Marital status:      Spouse name: Not on file   • Number of children: Not on file   • Years of education: Not on file   • Highest education level: Not on file   Occupational History   • Not on file   Tobacco Use   • Smoking status: Never Smoker   • Smokeless tobacco: Never Used   Vaping Use   • Vaping Use: Never used   Substance and Sexual Activity   • Alcohol use: No     Alcohol/week: 0.0 oz   • Drug use: No   • Sexual activity: Yes     Partners: Male   Other Topics Concern   •  Service No   • Blood Transfusions No   • Caffeine Concern No   • Occupational Exposure No   • Hobby Hazards No   • Sleep Concern Yes   • Stress Concern Yes   • Weight Concern Yes   • Special Diet No   • Back Care No   • Exercise Yes   • Bike Helmet Yes   • Seat Belt Yes   • Self-Exams Yes   Social History Narrative   • Not on file     Social Determinants of  "Health     Financial Resource Strain:    • Difficulty of Paying Living Expenses:    Food Insecurity:    • Worried About Running Out of Food in the Last Year:    • Ran Out of Food in the Last Year:    Transportation Needs:    • Lack of Transportation (Medical):    • Lack of Transportation (Non-Medical):    Physical Activity:    • Days of Exercise per Week:    • Minutes of Exercise per Session:    Stress:    • Feeling of Stress :    Social Connections:    • Frequency of Communication with Friends and Family:    • Frequency of Social Gatherings with Friends and Family:    • Attends [employer] Services:    • Active Member of Clubs or Organizations:    • Attends Club or Organization Meetings:    • Marital Status:    Intimate Partner Violence:    • Fear of Current or Ex-Partner:    • Emotionally Abused:    • Physically Abused:    • Sexually Abused:      Breast Cancer-related family history is not on file.      Review of Systems   Constitutional: Negative for chills and fever.   Eyes: Negative for blurred vision, double vision, discharge and redness.   Gastrointestinal: Negative for nausea.   Musculoskeletal: Negative for myalgias.   Skin: Positive for itching and rash.   Neurological: Negative for headaches.              Objective     /78   Pulse 71   Temp 36.2 °C (97.2 °F) (Temporal)   Resp 16   Ht 1.626 m (5' 4\")   Wt 108 kg (239 lb)   SpO2 98%   BMI 41.02 kg/m²      Physical Exam  Vitals and nursing note reviewed.   Constitutional:       Appearance: Normal appearance.   Cardiovascular:      Rate and Rhythm: Normal rate and regular rhythm.      Heart sounds: No murmur heard.     Pulmonary:      Effort: Pulmonary effort is normal.      Breath sounds: Normal breath sounds.   Musculoskeletal:         General: Normal range of motion.   Skin:     General: Skin is warm and dry.      Findings: Erythema and rash present.      Comments: To upper eyelids bilaterally and on maxillary surface bilaterally. "   Neurological:      General: No focal deficit present.      Mental Status: She is alert.                             Assessment & Plan        1. Eyelid dermatitis, allergic/contact  predniSONE (DELTASONE) 20 MG Tab     Advised that she may use otc hydrocortisone cream as directed in [facility] to rash area.  Prednisone for 3 days.  Differential diagnosis, natural history, supportive care, and indications for immediate follow-up discussed at length.

## 2021-10-27 DIAGNOSIS — H01.119 EYELID DERMATITIS, ALLERGIC/CONTACT: ICD-10-CM

## 2021-10-27 RX ORDER — PIMECROLIMUS 10 MG/G
1 CREAM TOPICAL 2 TIMES DAILY
Qty: 30 G | Refills: 0 | Status: SHIPPED | OUTPATIENT
Start: 2021-10-27 | End: 2022-10-25

## 2021-12-01 ENCOUNTER — HOSPITAL ENCOUNTER (OUTPATIENT)
Dept: RADIOLOGY | Facility: MEDICAL CENTER | Age: 51
End: 2021-12-01
Attending: FAMILY MEDICINE
Payer: COMMERCIAL

## 2021-12-01 DIAGNOSIS — Z12.31 VISIT FOR SCREENING MAMMOGRAM: ICD-10-CM

## 2021-12-01 PROCEDURE — 77063 BREAST TOMOSYNTHESIS BI: CPT

## 2021-12-02 NOTE — RESULT ENCOUNTER NOTE
Released to [facility]  Your mammogram was normal! Next is due in one year.   Please let me know immediately if you notice any new lumps/rashes/pain/nipple discharge.  Lemuel Wolfe M.D.

## 2022-10-11 ENCOUNTER — HOSPITAL ENCOUNTER (OUTPATIENT)
Dept: RADIOLOGY | Facility: MEDICAL CENTER | Age: 52
End: 2022-10-11
Attending: FAMILY MEDICINE
Payer: COMMERCIAL

## 2022-10-11 ENCOUNTER — OFFICE VISIT (OUTPATIENT)
Dept: URGENT CARE | Facility: PHYSICIAN GROUP | Age: 52
End: 2022-10-11
Payer: COMMERCIAL

## 2022-10-11 VITALS
OXYGEN SATURATION: 96 % | SYSTOLIC BLOOD PRESSURE: 136 MMHG | DIASTOLIC BLOOD PRESSURE: 88 MMHG | RESPIRATION RATE: 15 BRPM | HEART RATE: 76 BPM | TEMPERATURE: 98.5 F | BODY MASS INDEX: 41.83 KG/M2 | WEIGHT: 245 LBS | HEIGHT: 64 IN

## 2022-10-11 DIAGNOSIS — G43.819 OTHER MIGRAINE WITHOUT STATUS MIGRAINOSUS, INTRACTABLE: ICD-10-CM

## 2022-10-11 PROCEDURE — 99214 OFFICE O/P EST MOD 30 MIN: CPT | Performed by: FAMILY MEDICINE

## 2022-10-11 PROCEDURE — 70450 CT HEAD/BRAIN W/O DYE: CPT

## 2022-10-11 RX ORDER — KETOROLAC TROMETHAMINE 30 MG/ML
30 INJECTION, SOLUTION INTRAMUSCULAR; INTRAVENOUS ONCE
Status: COMPLETED | OUTPATIENT
Start: 2022-10-11 | End: 2022-10-11

## 2022-10-11 RX ORDER — ONDANSETRON 4 MG/1
4 TABLET, ORALLY DISINTEGRATING ORAL ONCE
Status: COMPLETED | OUTPATIENT
Start: 2022-10-11 | End: 2022-10-11

## 2022-10-11 RX ADMIN — KETOROLAC TROMETHAMINE 30 MG: 30 INJECTION, SOLUTION INTRAMUSCULAR; INTRAVENOUS at 11:14

## 2022-10-11 RX ADMIN — ONDANSETRON 4 MG: 4 TABLET, ORALLY DISINTEGRATING ORAL at 11:31

## 2022-10-11 NOTE — PROGRESS NOTES
"Chief Complaint   Patient presents with    Headache     Wednesday got a kink in her neck now having HA. HX migraines. X1 wk with HA           Migraine   This is a new problem. The current episode started in the past 5 days. The problem occurs constant. The problem has been unchanged. The pain is located in the occipital and vertex regions and base of cervical spine.       The quality of the pain is described as sharp. Associated symptoms include  neck pain, nausea, phonophobia and photophobia. Pertinent negatives include no abdominal pain or fever. The symptoms are aggravated by activity and bright light. Patient has tried acetaminophen for the symptoms.        Social History     Tobacco Use    Smoking status: Never    Smokeless tobacco: Never   Vaping Use    Vaping Use: Never used   Substance Use Topics    Alcohol use: No     Alcohol/week: 0.0 oz    Drug use: No           Past Medical History:   Diagnosis Date    Anemia     Anesthesia     nausea    Arthritis     deg disc disease lower back    Bowel habit changes     constipation    Gynecological disorder     fibroids, irreg periods    Hemorrhoids     Obesity            Review of Systems   Constitutional: Negative for fever and chills.   Eyes: Positive for photophobia.   Respiratory: Negative for shortness of breath.    Cardiovascular: Negative for chest pain and palpitations.   Gastrointestinal: Positive for nausea. Negative for abdominal pain.   Skin: Negative for rash.   Neurological: Positive for     headaches.   Psychiatric/Behavioral: The patient is not nervous/anxious.    All other systems reviewed and are negative.         Objective:     /88   Pulse 76   Temp 36.9 °C (98.5 °F) (Temporal)   Resp 15   Ht 1.626 m (5' 4\")   Wt 111 kg (245 lb)   SpO2 96%     Physical Exam   Constitutional: pt is oriented to person, place, and time.  Pt appears well-developed and well-nourished. No distress.   HENT:   Head: Normocephalic and atraumatic.   Mouth/Throat: " Oropharynx is clear and moist. No oropharyngeal exudate.   Eyes: Conjunctivae and EOM are normal. Pupils are equal, round, and reactive to light. Right eye exhibits no discharge. Left eye exhibits no discharge. No scleral icterus.   Neck: Neck supple. Full AROM.   No TTP  Cardiovascular: Normal rate and regular rhythm.    Pulmonary/Chest: Effort normal.   Lymphadenopathy:     Pt has no cervical adenopathy.   Neurologic: Alert and oriented. Cranial nerves II-XII intact, EOMs intact, no tongue deviation, PERRL, no facial asymmetry to motor or sensation, symmetric palate, normal finger-to-nose test, no pronator drift. No focal motor deficits. Symmetric reflexes. Normal station and gait, normal tandem walk. Coordination normal.   C-spine:   full AROM.   No bony TTP.   No muscular spasms  Skin: Skin is warm. Pt is not diaphoretic. No erythema. No pallor.   Psychiatric:  behavior is normal.   Nursing note and vitals reviewed.         Details    Reading Physician Reading Date Result Priority   London Constantino M.D.  [phone] 10/11/2022 Stat-Call Report     Narrative & Impression     10/11/2022 2:16 PM     HISTORY/REASON FOR EXAM:  Headache, new or worsening (Age >= 50y).        TECHNIQUE/EXAM DESCRIPTION AND NUMBER OF VIEWS:  CT of the head without contrast.     The study was performed on a helical multidetector CT scanner. Contiguous 2.5 mm axial sections were obtained from the skull base through the vertex.     Up to date radiation dose reduction adjustments have been utilized to meet ALARA standards for radiation dose reduction.     COMPARISON:  None available     FINDINGS:  Ventricles, cisterns and cortical sulci are within normal limits.  No acute intracranial hemorrhage, major vascular territory infarct, mass effect, midline shift or hydrocephalus.     The visualized paranasal sinuses and mastoids are clear.  No depressed calvarial fracture.     IMPRESSION:     Normal noncontrast head CT.                       Exam  Ended: 10/11/22  2:29 PM Last Resulted: 10/11/22  2:48 PM              Assessment/Plan:        1. Other migraine without status migrainosus, intractable       Significant improvement after toradol/zofran    CT personally reviewed and it was unremarkable.       Follow up in one week if no improvement, sooner if symptoms worsen.     - ketorolac (TORADOL) injection 30 mg  - CT-HEAD W/O; Future  - ondansetron (ZOFRAN ODT) dispertab 4 mg  - DX-CERVICAL SPINE-2 OR 3 VIEWS; Future        My total time spent caring for the patient on the day of the encounter was 30 minutes.   This does not include time spent on separately billable procedures/tests.

## 2022-10-12 ENCOUNTER — HOSPITAL ENCOUNTER (OUTPATIENT)
Dept: RADIOLOGY | Facility: MEDICAL CENTER | Age: 52
End: 2022-10-12
Attending: FAMILY MEDICINE
Payer: COMMERCIAL

## 2022-10-12 DIAGNOSIS — G43.819 OTHER MIGRAINE WITHOUT STATUS MIGRAINOSUS, INTRACTABLE: ICD-10-CM

## 2022-10-12 PROCEDURE — 72040 X-RAY EXAM NECK SPINE 2-3 VW: CPT

## 2022-10-21 SDOH — ECONOMIC STABILITY: HOUSING INSECURITY: IN THE LAST 12 MONTHS, HOW MANY PLACES HAVE YOU LIVED?: 1

## 2022-10-21 SDOH — ECONOMIC STABILITY: FOOD INSECURITY: WITHIN THE PAST 12 MONTHS, YOU WORRIED THAT YOUR FOOD WOULD RUN OUT BEFORE YOU GOT MONEY TO BUY MORE.: NEVER TRUE

## 2022-10-21 SDOH — ECONOMIC STABILITY: INCOME INSECURITY: HOW HARD IS IT FOR YOU TO PAY FOR THE VERY BASICS LIKE FOOD, HOUSING, MEDICAL CARE, AND HEATING?: NOT VERY HARD

## 2022-10-21 SDOH — ECONOMIC STABILITY: HOUSING INSECURITY
IN THE LAST 12 MONTHS, WAS THERE A TIME WHEN YOU DID NOT HAVE A STEADY PLACE TO SLEEP OR SLEPT IN A SHELTER (INCLUDING NOW)?: NO

## 2022-10-21 SDOH — ECONOMIC STABILITY: INCOME INSECURITY: IN THE LAST 12 MONTHS, WAS THERE A TIME WHEN YOU WERE NOT ABLE TO PAY THE MORTGAGE OR RENT ON TIME?: NO

## 2022-10-21 SDOH — ECONOMIC STABILITY: TRANSPORTATION INSECURITY
IN THE PAST 12 MONTHS, HAS LACK OF TRANSPORTATION KEPT YOU FROM MEETINGS, WORK, OR FROM GETTING THINGS NEEDED FOR DAILY LIVING?: NO

## 2022-10-21 SDOH — HEALTH STABILITY: PHYSICAL HEALTH: ON AVERAGE, HOW MANY MINUTES DO YOU ENGAGE IN EXERCISE AT THIS LEVEL?: 30 MIN

## 2022-10-21 SDOH — ECONOMIC STABILITY: FOOD INSECURITY: WITHIN THE PAST 12 MONTHS, THE FOOD YOU BOUGHT JUST DIDN'T LAST AND YOU DIDN'T HAVE MONEY TO GET MORE.: NEVER TRUE

## 2022-10-21 SDOH — HEALTH STABILITY: MENTAL HEALTH
STRESS IS WHEN SOMEONE FEELS TENSE, NERVOUS, ANXIOUS, OR CAN'T SLEEP AT NIGHT BECAUSE THEIR MIND IS TROUBLED. HOW STRESSED ARE YOU?: TO SOME EXTENT

## 2022-10-21 SDOH — ECONOMIC STABILITY: TRANSPORTATION INSECURITY
IN THE PAST 12 MONTHS, HAS THE LACK OF TRANSPORTATION KEPT YOU FROM MEDICAL APPOINTMENTS OR FROM GETTING MEDICATIONS?: NO

## 2022-10-21 SDOH — ECONOMIC STABILITY: TRANSPORTATION INSECURITY
IN THE PAST 12 MONTHS, HAS LACK OF RELIABLE TRANSPORTATION KEPT YOU FROM MEDICAL APPOINTMENTS, MEETINGS, WORK OR FROM GETTING THINGS NEEDED FOR DAILY LIVING?: NO

## 2022-10-21 SDOH — HEALTH STABILITY: PHYSICAL HEALTH: ON AVERAGE, HOW MANY DAYS PER WEEK DO YOU ENGAGE IN MODERATE TO STRENUOUS EXERCISE (LIKE A BRISK WALK)?: 2 DAYS

## 2022-10-21 ASSESSMENT — SOCIAL DETERMINANTS OF HEALTH (SDOH)
HOW OFTEN DO YOU GET TOGETHER WITH FRIENDS OR RELATIVES?: THREE TIMES A WEEK
HOW MANY DRINKS CONTAINING ALCOHOL DO YOU HAVE ON A TYPICAL DAY WHEN YOU ARE DRINKING: PATIENT DOES NOT DRINK
DO YOU BELONG TO ANY CLUBS OR ORGANIZATIONS SUCH AS CHURCH GROUPS UNIONS, FRATERNAL OR ATHLETIC GROUPS, OR SCHOOL GROUPS?: YES
HOW OFTEN DO YOU HAVE SIX OR MORE DRINKS ON ONE OCCASION: NEVER
HOW OFTEN DO YOU GET TOGETHER WITH FRIENDS OR RELATIVES?: THREE TIMES A WEEK
HOW OFTEN DO YOU ATTENT MEETINGS OF THE CLUB OR ORGANIZATION YOU BELONG TO?: MORE THAN 4 TIMES PER YEAR
IN A TYPICAL WEEK, HOW MANY TIMES DO YOU TALK ON THE PHONE WITH FAMILY, FRIENDS, OR NEIGHBORS?: TWICE A WEEK
HOW OFTEN DO YOU ATTENT MEETINGS OF THE CLUB OR ORGANIZATION YOU BELONG TO?: MORE THAN 4 TIMES PER YEAR
WITHIN THE PAST 12 MONTHS, YOU WORRIED THAT YOUR FOOD WOULD RUN OUT BEFORE YOU GOT THE MONEY TO BUY MORE: NEVER TRUE
IN A TYPICAL WEEK, HOW MANY TIMES DO YOU TALK ON THE PHONE WITH FAMILY, FRIENDS, OR NEIGHBORS?: TWICE A WEEK
DO YOU BELONG TO ANY CLUBS OR ORGANIZATIONS SUCH AS CHURCH GROUPS UNIONS, FRATERNAL OR ATHLETIC GROUPS, OR SCHOOL GROUPS?: YES
HOW HARD IS IT FOR YOU TO PAY FOR THE VERY BASICS LIKE FOOD, HOUSING, MEDICAL CARE, AND HEATING?: NOT VERY HARD
HOW OFTEN DO YOU ATTEND CHURCH OR RELIGIOUS SERVICES?: NEVER
HOW OFTEN DO YOU ATTEND CHURCH OR RELIGIOUS SERVICES?: NEVER

## 2022-10-21 ASSESSMENT — LIFESTYLE VARIABLES
HOW OFTEN DO YOU HAVE SIX OR MORE DRINKS ON ONE OCCASION: NEVER
HOW MANY STANDARD DRINKS CONTAINING ALCOHOL DO YOU HAVE ON A TYPICAL DAY: PATIENT DOES NOT DRINK

## 2022-10-25 ENCOUNTER — OFFICE VISIT (OUTPATIENT)
Dept: MEDICAL GROUP | Facility: PHYSICIAN GROUP | Age: 52
End: 2022-10-25
Payer: COMMERCIAL

## 2022-10-25 VITALS
WEIGHT: 247 LBS | SYSTOLIC BLOOD PRESSURE: 122 MMHG | OXYGEN SATURATION: 96 % | HEART RATE: 76 BPM | RESPIRATION RATE: 16 BRPM | DIASTOLIC BLOOD PRESSURE: 78 MMHG | TEMPERATURE: 97.9 F | HEIGHT: 64 IN | BODY MASS INDEX: 42.17 KG/M2

## 2022-10-25 DIAGNOSIS — M25.542 ARTHRALGIA OF BOTH HANDS: ICD-10-CM

## 2022-10-25 DIAGNOSIS — M25.541 ARTHRALGIA OF BOTH HANDS: ICD-10-CM

## 2022-10-25 DIAGNOSIS — D50.8 IRON DEFICIENCY ANEMIA SECONDARY TO INADEQUATE DIETARY IRON INTAKE: ICD-10-CM

## 2022-10-25 DIAGNOSIS — Z13.220 SCREENING, LIPID: ICD-10-CM

## 2022-10-25 DIAGNOSIS — Z23 NEED FOR VACCINATION: ICD-10-CM

## 2022-10-25 PROBLEM — L30.9 DERMATITIS: Status: RESOLVED | Noted: 2021-10-11 | Resolved: 2022-10-25

## 2022-10-25 PROCEDURE — 90686 IIV4 VACC NO PRSV 0.5 ML IM: CPT | Performed by: FAMILY MEDICINE

## 2022-10-25 PROCEDURE — 99214 OFFICE O/P EST MOD 30 MIN: CPT | Mod: 25 | Performed by: FAMILY MEDICINE

## 2022-10-25 PROCEDURE — 90471 IMMUNIZATION ADMIN: CPT | Performed by: FAMILY MEDICINE

## 2022-10-25 RX ORDER — AZITHROMYCIN 250 MG/1
TABLET, FILM COATED ORAL
COMMUNITY
Start: 2022-10-24 | End: 2023-01-25

## 2022-10-25 ASSESSMENT — PATIENT HEALTH QUESTIONNAIRE - PHQ9: CLINICAL INTERPRETATION OF PHQ2 SCORE: 0

## 2022-10-25 NOTE — PROGRESS NOTES
"Subjective:   Jen Mejía is a 52 y.o. female here today for evaluation and management of:     Arthralgia of both hands  She has for 6 months b/l pain in joints of hands,   Trouble loosing weight even with walking a few miles a day.   Also recent severe migraines improving after infected tooth removed.   Fasting labs ordered, b/l hand xrays removed.                Current medicines (including changes today)  Current Outpatient Medications   Medication Sig Dispense Refill    azithromycin (ZITHROMAX) 250 MG Tab       Probiotic Product (PROBIOTIC DAILY) Cap Take 1 Cap by mouth every day.      pimecrolimus (ELIDEL) 1 % cream Apply 1 Application topically 2 times a day. (Patient not taking: Reported on 10/25/2022) 30 g 0    predniSONE (DELTASONE) 20 MG Tab Take 2 tabs daily for 3 days. (Patient not taking: Reported on 10/25/2022) 6 Tablet 0    tizanidine (ZANAFLEX) 4 MG Tab Take 1 Tab by mouth every 6 hours as needed. (Patient not taking: Reported on 10/25/2022) 30 Tab 3    therapeutic multivitamin-minerals (THERAGRAN-M) Tab Take 1 Tab by mouth every day. (Patient not taking: Reported on 10/25/2022)      Calcium-Vitamin D-Vitamin K (CALCIUM SOFT CHEWS PO) Take 1 Tab by mouth every day. (Patient not taking: Reported on 10/25/2022)       No current facility-administered medications for this visit.     She  has a past medical history of Anemia, Anesthesia, Arthritis, Bowel habit changes, Gynecological disorder, Hemorrhoids, and Obesity.    ROS  No chest pain, no shortness of breath, no abdominal pain       Objective:     /78   Pulse 76   Temp 36.6 °C (97.9 °F) (Temporal)   Resp 16   Ht 1.626 m (5' 4\")   Wt 112 kg (247 lb)   SpO2 96%  Body mass index is 42.4 kg/m².   Physical Exam:  Constitutional: Alert, no distress.  Skin: Warm, dry, good turgor, no rashes in visible areas.  Eye: Equal, round and reactive, conjunctiva clear, lids normal.  ENMT: Lips without lesions, good dentition, oropharynx " clear.  Neck: Trachea midline, no masses, no thyromegaly. No cervical or supraclavicular lymphadenopathy  Respiratory: Unlabored respiratory effort, lungs clear to auscultation, no wheezes, no ronchi.  Cardiovascular: Normal S1, S2, no murmur, no edema.  Abdomen: Soft, non-tender, no masses, no hepatosplenomegaly.  Psych: Alert and oriented x3, normal affect and mood.        Assessment and Plan:   The following treatment plan was discussed    1. Arthralgia of both hands  - CBC WITH DIFFERENTIAL; Future  - Comp Metabolic Panel; Future  - CCP  - RHEUMATOID ARTHRITIS FACTOR; Future  - DX-JOINT SURVEY-HANDS SINGLE VIEW; Future  - TSH WITH REFLEX TO FT4; Future    2. Screening, lipid  - Lipid Profile; Future    3. Iron deficiency anemia secondary to inadequate dietary iron intake  - IRON/TOTAL IRON BIND; Future  - FERRITIN; Future    4. Need for vaccination  - INFLUENZA VACCINE QUAD INJ (PF)    Other orders  - azithromycin (ZITHROMAX) 250 MG Tab      Followup: No follow-ups on file.

## 2022-10-25 NOTE — ASSESSMENT & PLAN NOTE
She has for 6 months b/l pain in joints of hands,   Trouble loosing weight even with walking a few miles a day.   Also recent severe migraines improving after infected tooth removed.   Fasting labs ordered, b/l hand xrays removed.

## 2022-10-25 NOTE — PATIENT INSTRUCTIONS
Please get fasting labs, fasting for 8-10 hours before next visit. You can make an appointment for the lab or walk in.   Lab hours [facility] location: Monday to Friday 6 am - 4 pm, Saturday 7 am -noon  Even if you lose your lab paperwork, you can still come in to get your lab done.

## 2022-10-26 ENCOUNTER — HOSPITAL ENCOUNTER (OUTPATIENT)
Dept: LAB | Facility: MEDICAL CENTER | Age: 52
End: 2022-10-26
Attending: FAMILY MEDICINE
Payer: COMMERCIAL

## 2022-10-26 DIAGNOSIS — M25.542 ARTHRALGIA OF BOTH HANDS: ICD-10-CM

## 2022-10-26 DIAGNOSIS — Z13.220 SCREENING, LIPID: ICD-10-CM

## 2022-10-26 DIAGNOSIS — D50.8 IRON DEFICIENCY ANEMIA SECONDARY TO INADEQUATE DIETARY IRON INTAKE: ICD-10-CM

## 2022-10-26 DIAGNOSIS — M25.541 ARTHRALGIA OF BOTH HANDS: ICD-10-CM

## 2022-10-26 LAB
ALBUMIN SERPL BCP-MCNC: 4.3 G/DL (ref 3.2–4.9)
ALBUMIN/GLOB SERPL: 1.8 G/DL
ALP SERPL-CCNC: 95 U/L (ref 30–99)
ALT SERPL-CCNC: 20 U/L (ref 2–50)
ANION GAP SERPL CALC-SCNC: 10 MMOL/L (ref 7–16)
AST SERPL-CCNC: 18 U/L (ref 12–45)
BASOPHILS # BLD AUTO: 0.6 % (ref 0–1.8)
BASOPHILS # BLD: 0.03 K/UL (ref 0–0.12)
BILIRUB SERPL-MCNC: 0.6 MG/DL (ref 0.1–1.5)
BUN SERPL-MCNC: 14 MG/DL (ref 8–22)
CALCIUM SERPL-MCNC: 9.4 MG/DL (ref 8.5–10.5)
CHLORIDE SERPL-SCNC: 106 MMOL/L (ref 96–112)
CHOLEST SERPL-MCNC: 171 MG/DL (ref 100–199)
CO2 SERPL-SCNC: 24 MMOL/L (ref 20–33)
CREAT SERPL-MCNC: 0.7 MG/DL (ref 0.5–1.4)
EOSINOPHIL # BLD AUTO: 0.16 K/UL (ref 0–0.51)
EOSINOPHIL NFR BLD: 3.2 % (ref 0–6.9)
ERYTHROCYTE [DISTWIDTH] IN BLOOD BY AUTOMATED COUNT: 42.4 FL (ref 35.9–50)
FASTING STATUS PATIENT QL REPORTED: NORMAL
FERRITIN SERPL-MCNC: 25.5 NG/ML (ref 10–291)
GFR SERPLBLD CREATININE-BSD FMLA CKD-EPI: 105 ML/MIN/1.73 M 2
GLOBULIN SER CALC-MCNC: 2.4 G/DL (ref 1.9–3.5)
GLUCOSE SERPL-MCNC: 86 MG/DL (ref 65–99)
HCT VFR BLD AUTO: 44.9 % (ref 37–47)
HDLC SERPL-MCNC: 50 MG/DL
HGB BLD-MCNC: 14.4 G/DL (ref 12–16)
IMM GRANULOCYTES # BLD AUTO: 0.01 K/UL (ref 0–0.11)
IMM GRANULOCYTES NFR BLD AUTO: 0.2 % (ref 0–0.9)
IRON SATN MFR SERPL: 26 % (ref 15–55)
IRON SERPL-MCNC: 86 UG/DL (ref 40–170)
LDLC SERPL CALC-MCNC: 101 MG/DL
LYMPHOCYTES # BLD AUTO: 1.41 K/UL (ref 1–4.8)
LYMPHOCYTES NFR BLD: 27.9 % (ref 22–41)
MCH RBC QN AUTO: 27.6 PG (ref 27–33)
MCHC RBC AUTO-ENTMCNC: 32.1 G/DL (ref 33.6–35)
MCV RBC AUTO: 86 FL (ref 81.4–97.8)
MONOCYTES # BLD AUTO: 0.37 K/UL (ref 0–0.85)
MONOCYTES NFR BLD AUTO: 7.3 % (ref 0–13.4)
NEUTROPHILS # BLD AUTO: 3.07 K/UL (ref 2–7.15)
NEUTROPHILS NFR BLD: 60.8 % (ref 44–72)
NRBC # BLD AUTO: 0 K/UL
NRBC BLD-RTO: 0 /100 WBC
PLATELET # BLD AUTO: 208 K/UL (ref 164–446)
PMV BLD AUTO: 12.5 FL (ref 9–12.9)
POTASSIUM SERPL-SCNC: 4.1 MMOL/L (ref 3.6–5.5)
PROT SERPL-MCNC: 6.7 G/DL (ref 6–8.2)
RBC # BLD AUTO: 5.22 M/UL (ref 4.2–5.4)
RHEUMATOID FACT SER IA-ACNC: <10 IU/ML (ref 0–14)
SODIUM SERPL-SCNC: 140 MMOL/L (ref 135–145)
TIBC SERPL-MCNC: 331 UG/DL (ref 250–450)
TRIGL SERPL-MCNC: 100 MG/DL (ref 0–149)
TSH SERPL DL<=0.005 MIU/L-ACNC: 3.6 UIU/ML (ref 0.38–5.33)
UIBC SERPL-MCNC: 245 UG/DL (ref 110–370)
WBC # BLD AUTO: 5.1 K/UL (ref 4.8–10.8)

## 2022-10-26 PROCEDURE — 83550 IRON BINDING TEST: CPT

## 2022-10-26 PROCEDURE — 80061 LIPID PANEL: CPT

## 2022-10-26 PROCEDURE — 86200 CCP ANTIBODY: CPT

## 2022-10-26 PROCEDURE — 36415 COLL VENOUS BLD VENIPUNCTURE: CPT

## 2022-10-26 PROCEDURE — 85025 COMPLETE CBC W/AUTO DIFF WBC: CPT

## 2022-10-26 PROCEDURE — 86431 RHEUMATOID FACTOR QUANT: CPT

## 2022-10-26 PROCEDURE — 83540 ASSAY OF IRON: CPT

## 2022-10-26 PROCEDURE — 82728 ASSAY OF FERRITIN: CPT

## 2022-10-26 PROCEDURE — 80053 COMPREHEN METABOLIC PANEL: CPT

## 2022-10-26 PROCEDURE — 84443 ASSAY THYROID STIM HORMONE: CPT

## 2022-10-28 LAB — CCP IGG SERPL-ACNC: 3 UNITS (ref 0–19)

## 2022-12-07 ENCOUNTER — HOSPITAL ENCOUNTER (OUTPATIENT)
Dept: RADIOLOGY | Facility: MEDICAL CENTER | Age: 52
End: 2022-12-07
Attending: FAMILY MEDICINE
Payer: COMMERCIAL

## 2022-12-07 DIAGNOSIS — Z12.31 VISIT FOR SCREENING MAMMOGRAM: ICD-10-CM

## 2022-12-07 PROCEDURE — 77063 BREAST TOMOSYNTHESIS BI: CPT

## 2023-01-23 ENCOUNTER — APPOINTMENT (OUTPATIENT)
Dept: RADIOLOGY | Facility: IMAGING CENTER | Age: 53
End: 2023-01-23
Attending: FAMILY MEDICINE
Payer: COMMERCIAL

## 2023-01-23 ENCOUNTER — NON-PROVIDER VISIT (OUTPATIENT)
Dept: URGENT CARE | Facility: PHYSICIAN GROUP | Age: 53
End: 2023-01-23
Payer: COMMERCIAL

## 2023-01-23 DIAGNOSIS — M25.542 ARTHRALGIA OF BOTH HANDS: ICD-10-CM

## 2023-01-23 DIAGNOSIS — M25.541 ARTHRALGIA OF BOTH HANDS: ICD-10-CM

## 2023-01-23 PROCEDURE — 77077 JOINT SURVEY SINGLE VIEW: CPT | Mod: TC,FY | Performed by: RADIOLOGY

## 2023-01-25 ENCOUNTER — OFFICE VISIT (OUTPATIENT)
Dept: MEDICAL GROUP | Facility: PHYSICIAN GROUP | Age: 53
End: 2023-01-25
Payer: COMMERCIAL

## 2023-01-25 VITALS
WEIGHT: 244 LBS | TEMPERATURE: 96.8 F | OXYGEN SATURATION: 97 % | DIASTOLIC BLOOD PRESSURE: 64 MMHG | RESPIRATION RATE: 18 BRPM | HEIGHT: 64 IN | BODY MASS INDEX: 41.66 KG/M2 | SYSTOLIC BLOOD PRESSURE: 120 MMHG | HEART RATE: 77 BPM

## 2023-01-25 DIAGNOSIS — M25.541 ARTHRALGIA OF BOTH HANDS: ICD-10-CM

## 2023-01-25 DIAGNOSIS — E66.01 MORBID OBESITY WITH BMI OF 40.0-44.9, ADULT (HCC): ICD-10-CM

## 2023-01-25 DIAGNOSIS — M25.542 ARTHRALGIA OF BOTH HANDS: ICD-10-CM

## 2023-01-25 DIAGNOSIS — Z11.59 ENCOUNTER FOR HEPATITIS C SCREENING TEST FOR LOW RISK PATIENT: ICD-10-CM

## 2023-01-25 DIAGNOSIS — E78.00 ELEVATED LDL CHOLESTEROL LEVEL: ICD-10-CM

## 2023-01-25 DIAGNOSIS — Z23 NEED FOR VACCINATION: ICD-10-CM

## 2023-01-25 PROBLEM — E66.9 OBESITY (BMI 30-39.9): Status: RESOLVED | Noted: 2018-01-17 | Resolved: 2023-01-25

## 2023-01-25 PROCEDURE — 90471 IMMUNIZATION ADMIN: CPT | Performed by: FAMILY MEDICINE

## 2023-01-25 PROCEDURE — 99214 OFFICE O/P EST MOD 30 MIN: CPT | Mod: 25 | Performed by: FAMILY MEDICINE

## 2023-01-25 PROCEDURE — 90746 HEPB VACCINE 3 DOSE ADULT IM: CPT | Performed by: FAMILY MEDICINE

## 2023-01-25 ASSESSMENT — FIBROSIS 4 INDEX: FIB4 SCORE: 0.99

## 2023-01-25 ASSESSMENT — PATIENT HEALTH QUESTIONNAIRE - PHQ9: CLINICAL INTERPRETATION OF PHQ2 SCORE: 0

## 2023-01-25 NOTE — PATIENT INSTRUCTIONS
Please get fasting labs, fasting for 8-10 hours before next visit. You can make an appointment for the lab or walk in.   Lab hours [facility] location: Monday to Friday 6 am - 4 pm, Saturday 7 am -noon  Even if you lose your lab paperwork, you can still come in to get your lab done.

## 2023-01-25 NOTE — ASSESSMENT & PLAN NOTE
She feels better mentally and physically since her tooth abscess was treated and she has been regular at the gym, weight is coming off very slowly  Normal sugars, borderline high LDL, tsh normal  Ref to nutrition services provided for help with weight loss.

## 2023-01-25 NOTE — ASSESSMENT & PLAN NOTE
DIPs of b/l hands with swelling and pain.   Mother also has sig OA needed a ring cut off.   pts xrays showed no OA or erosions  Topical dutera deep blue helps a lot with her pain.   Advised ice, ibuprofen  ccp and RF are negative

## 2023-01-25 NOTE — PROGRESS NOTES
"Subjective:   Jen Mejía is a 52 y.o. female here today for evaluation and management of:     Arthralgia of both hands  DIPs of b/l hands with swelling and pain.   Mother also has sig OA needed a ring cut off.   pts xrays showed no OA or erosions  Topical dutera deep blue helps a lot with her pain.   Advised ice, ibuprofen  ccp and RF are negative    Morbid obesity with BMI of 40.0-44.9, adult (HCC)  She feels better mentally and physically since her tooth abscess was treated and she has been regular at the gym, weight is coming off very slowly  Normal sugars, borderline high LDL, tsh normal  Ref to nutrition services provided for help with weight loss.                Current medicines (including changes today)  Current Outpatient Medications   Medication Sig Dispense Refill    Probiotic Product (PROBIOTIC DAILY) Cap Take 1 Cap by mouth every day.       No current facility-administered medications for this visit.     She  has a past medical history of Anemia, Anesthesia, Arthritis, Bowel habit changes, Gynecological disorder, Hemorrhoids, and Obesity.    ROS  No chest pain, no shortness of breath, no abdominal pain       Objective:     /64   Pulse 77   Temp 36 °C (96.8 °F) (Temporal)   Resp 18   Ht 1.626 m (5' 4\")   Wt 111 kg (244 lb)   SpO2 97%  Body mass index is 41.88 kg/m².   Physical Exam:  Constitutional: Alert, no distress.  Skin: Warm, dry, good turgor, no rashes in visible areas.  Eye: Equal, round and reactive, conjunctiva clear, lids normal.  ENMT: Lips without lesions, good dentition, oropharynx clear.  Neck: Trachea midline, no masses, no thyromegaly. No cervical or supraclavicular lymphadenopathy  Respiratory: Unlabored respiratory effort, lungs clear to auscultation, no wheezes, no ronchi.  Cardiovascular: Normal S1, S2, no murmur, no edema.  Abdomen: Soft, non-tender, no masses, no hepatosplenomegaly.  Psych: Alert and oriented x3, normal affect and mood.        Assessment and " Plan:   The following treatment plan was discussed    1. Morbid obesity with BMI of 40.0-44.9, adult (HCC)  - Patient identified as having weight management issue.  Appropriate orders and counseling given.  - Referral to Nutrition Services    2. Encounter for hepatitis C screening test for low risk patient  - HCV Scrn ( 9029-2700 1xLife); Future    3. Need for vaccination  - Hepatitis B Vaccine Adult 20+    4. Elevated LDL cholesterol level  - Comp Metabolic Panel; Future  - Lipid Profile; Future    5. Arthralgia of both hands      Followup: Return in about 9 months (around 10/25/2023) for Lab Review.

## 2023-03-11 ENCOUNTER — APPOINTMENT (OUTPATIENT)
Dept: RADIOLOGY | Facility: IMAGING CENTER | Age: 53
End: 2023-03-11
Attending: FAMILY MEDICINE
Payer: COMMERCIAL

## 2023-03-11 ENCOUNTER — OFFICE VISIT (OUTPATIENT)
Dept: URGENT CARE | Facility: PHYSICIAN GROUP | Age: 53
End: 2023-03-11
Payer: COMMERCIAL

## 2023-03-11 VITALS
DIASTOLIC BLOOD PRESSURE: 72 MMHG | BODY MASS INDEX: 42.85 KG/M2 | WEIGHT: 251 LBS | HEART RATE: 69 BPM | TEMPERATURE: 96.9 F | HEIGHT: 64 IN | OXYGEN SATURATION: 98 % | SYSTOLIC BLOOD PRESSURE: 132 MMHG | RESPIRATION RATE: 18 BRPM

## 2023-03-11 DIAGNOSIS — S67.10XA CRUSHING INJURY OF FINGER, INITIAL ENCOUNTER: ICD-10-CM

## 2023-03-11 PROCEDURE — 73140 X-RAY EXAM OF FINGER(S): CPT | Mod: TC,FY,RT | Performed by: RADIOLOGY

## 2023-03-11 PROCEDURE — 99213 OFFICE O/P EST LOW 20 MIN: CPT | Performed by: FAMILY MEDICINE

## 2023-03-11 ASSESSMENT — FIBROSIS 4 INDEX: FIB4 SCORE: 0.99

## 2023-03-11 NOTE — PROGRESS NOTES
"Subjective:      Chief Complaint   Patient presents with    Trauma     Right Pinky finger crushed today by air compressor          HPI       Crushed rt pinky finger between air compressor and wall at home today.    Pain is worse with bending it.          Social History     Tobacco Use    Smoking status: Never    Smokeless tobacco: Never   Vaping Use    Vaping Use: Never used   Substance Use Topics    Alcohol use: No     Alcohol/week: 0.0 oz    Drug use: No             Current Outpatient Medications on File Prior to Visit   Medication Sig Dispense Refill    Probiotic Product (PROBIOTIC DAILY) Cap Take 1 Cap by mouth every day.       No current facility-administered medications on file prior to visit.         Past Medical History:   Diagnosis Date    Anemia     Anesthesia     nausea    Arthritis     deg disc disease lower back    Bowel habit changes     constipation    Gynecological disorder     fibroids, irreg periods    Hemorrhoids     Obesity                Review of Systems   Constitutional: Negative for fever.   Respiratory: Negative for cough.    Cardiovascular: Negative for chest pain.   All other systems reviewed and are negative.         Objective:     /72   Pulse 69   Temp 36.1 °C (96.9 °F) (Temporal)   Resp 18   Ht 1.626 m (5' 4\")   Wt 114 kg (251 lb)   SpO2 98%     Physical Exam   Constitutional: pt is oriented to person, place, and time. Pt appears well-developed and well-nourished. No distress.   HENT:   Head: Normocephalic and atraumatic.   Eyes: Conjunctivae are normal.   Cardiovascular: Normal rate.  RRR.  No murmer   Pulmonary/Chest: Effort normal.  CTAB  Musculoskeletal:        Right hand:   Rt 5th digit:   there is subungal hematoma, but nail is not avulsed.   + minor abrasion to distal finger, + swelling.      Full AROM.    Normal sensation noted. Normal strength noted.   Neurological: pt is alert and oriented to person, place, and time.   Skin: Skin is warm. Pt is not diaphoretic. No " erythema.   Nursing note and vitals reviewed.         Details    Reading Physician Reading Date Result Priority   Tawanda Nelson M.D.  [phone] 3/11/2023 Ur[facility]     Narrative & Impression     3/11/2023 9:34 AM     HISTORY/REASON FOR EXAM:  Pain/Deformity Following Trauma.  .     TECHNIQUE/EXAM DESCRIPTION AND NUMBER OF VIEWS:  3 views of the RIGHT fingers.     COMPARISON: January 23, 2023     FINDINGS:  There is no evidence of fracture or dislocation. There is some osteophyte formation along the dorsal medial aspect of the base of the fifth distal phalanx.     IMPRESSION:     1.  No evidence of acute bony injury.     Assessment/Plan:     1. Crushing injury of finger, initial encounter     X-rays were personally reviewed by myself.   There is no fracture    noted.       Finger cleaned and dressed, polysporin applied      Basic wound care instructions given:  -advised to keep the wound clean and dry.   -daily dressing changes  -wash with mild soap and water daily  -advised no hydrogen peroxide or ETOH  -Pt will RTC immediately for:  Increased pain, swelling, red streaking, wound discharge or fever  -pt voiced understanding  -all questions answered

## 2023-07-22 ENCOUNTER — HOSPITAL ENCOUNTER (EMERGENCY)
Facility: MEDICAL CENTER | Age: 53
End: 2023-07-22
Attending: EMERGENCY MEDICINE
Payer: COMMERCIAL

## 2023-07-22 VITALS
DIASTOLIC BLOOD PRESSURE: 90 MMHG | RESPIRATION RATE: 20 BRPM | HEART RATE: 52 BPM | TEMPERATURE: 98.2 F | OXYGEN SATURATION: 95 % | WEIGHT: 249.78 LBS | SYSTOLIC BLOOD PRESSURE: 141 MMHG | BODY MASS INDEX: 42.87 KG/M2

## 2023-07-22 DIAGNOSIS — R51.9 NONINTRACTABLE HEADACHE, UNSPECIFIED CHRONICITY PATTERN, UNSPECIFIED HEADACHE TYPE: ICD-10-CM

## 2023-07-22 PROCEDURE — 99284 EMERGENCY DEPT VISIT MOD MDM: CPT

## 2023-07-22 PROCEDURE — 700105 HCHG RX REV CODE 258: Performed by: EMERGENCY MEDICINE

## 2023-07-22 PROCEDURE — 700111 HCHG RX REV CODE 636 W/ 250 OVERRIDE (IP): Performed by: EMERGENCY MEDICINE

## 2023-07-22 PROCEDURE — 96374 THER/PROPH/DIAG INJ IV PUSH: CPT

## 2023-07-22 PROCEDURE — 96375 TX/PRO/DX INJ NEW DRUG ADDON: CPT

## 2023-07-22 RX ORDER — METOCLOPRAMIDE 10 MG/1
10 TABLET ORAL 4 TIMES DAILY PRN
Qty: 20 TABLET | Refills: 0 | Status: SHIPPED | OUTPATIENT
Start: 2023-07-22 | End: 2023-08-31

## 2023-07-22 RX ORDER — KETOROLAC TROMETHAMINE 30 MG/ML
15 INJECTION, SOLUTION INTRAMUSCULAR; INTRAVENOUS ONCE
Status: COMPLETED | OUTPATIENT
Start: 2023-07-22 | End: 2023-07-22

## 2023-07-22 RX ORDER — DIPHENHYDRAMINE HYDROCHLORIDE 50 MG/ML
50 INJECTION INTRAMUSCULAR; INTRAVENOUS ONCE
Status: COMPLETED | OUTPATIENT
Start: 2023-07-22 | End: 2023-07-22

## 2023-07-22 RX ORDER — SODIUM CHLORIDE 9 MG/ML
1000 INJECTION, SOLUTION INTRAVENOUS ONCE
Status: COMPLETED | OUTPATIENT
Start: 2023-07-22 | End: 2023-07-22

## 2023-07-22 RX ORDER — IBUPROFEN 200 MG
600 TABLET ORAL EVERY 6 HOURS PRN
COMMUNITY

## 2023-07-22 RX ORDER — ACETAMINOPHEN 500 MG
1000 TABLET ORAL EVERY 6 HOURS PRN
COMMUNITY

## 2023-07-22 RX ORDER — METOCLOPRAMIDE HYDROCHLORIDE 5 MG/ML
10 INJECTION INTRAMUSCULAR; INTRAVENOUS ONCE
Status: COMPLETED | OUTPATIENT
Start: 2023-07-22 | End: 2023-07-22

## 2023-07-22 RX ADMIN — KETOROLAC TROMETHAMINE 15 MG: 30 INJECTION, SOLUTION INTRAMUSCULAR; INTRAVENOUS at 09:05

## 2023-07-22 RX ADMIN — METOCLOPRAMIDE 10 MG: 5 INJECTION, SOLUTION INTRAMUSCULAR; INTRAVENOUS at 09:05

## 2023-07-22 RX ADMIN — DIPHENHYDRAMINE HYDROCHLORIDE 50 MG: 50 INJECTION, SOLUTION INTRAMUSCULAR; INTRAVENOUS at 09:05

## 2023-07-22 RX ADMIN — SODIUM CHLORIDE 1000 ML: 9 INJECTION, SOLUTION INTRAVENOUS at 09:04

## 2023-07-22 ASSESSMENT — PAIN DESCRIPTION - PAIN TYPE
TYPE: ACUTE PAIN
TYPE: ACUTE PAIN

## 2023-07-22 ASSESSMENT — FIBROSIS 4 INDEX: FIB4 SCORE: 0.99

## 2023-07-22 NOTE — ED NOTES
Pt ambulatory to bathroom with steady gait. Pt back in room and denies needing further nursing interventions at this time.

## 2023-07-22 NOTE — ED NOTES
Pt changed into gown and on appropriate monitors. Call light within reach.     IV access obtained. Blood drawn and sent to lab.     ERP at bedside.

## 2023-07-22 NOTE — ED TRIAGE NOTES
Ambulates to triage with her parents  Chief Complaint   Patient presents with    Headache     Has had a headache everyday since 7/14, hx of migraines, last took tylenol around 4am. Pain more on the back of her head. Denies n/v, does have light sensitivity     Denies any trauma, has some relief with OTC meds, but her headache has not every gone away since 7/14. Has never taken migraine meds.

## 2023-07-22 NOTE — ED NOTES
Pt discharged, all appropriate hospital equipment removed (IV, monitor, pulse ox, etc.). Pt left unit via walking with friend to vehicle for home. Personal belongings with pt when leaving unit. Pt given discharge instructions prior to leaving unit including where to  prescriptions and when to follow-up; verbalizes understanding. Pt informed to return to ED if symptoms worsen/return or altered status develop. Copy of discharge instructions signed and turned into DC basket and copy sent with pt. F/u with PCP

## 2023-07-22 NOTE — ED PROVIDER NOTES
"ED Provider Note  CHIEF COMPLAINT  Chief Complaint   Patient presents with    Headache     Has had a headache everyday since 7/14, hx of migraines, last took tylenol around 4am. Pain more on the back of her head. Denies n/v, does have light sensitivity       HPI  Jen Mejía is a 52 y.o. female who presents for evaluation of headache.  Patient notes the headache extends from the top of her head to the base of her posterior neck and has been constant, although waxing and waning over the past 7 days or so.  She notes nausea and mild photophobia but no other significant visual symptoms currently.  She has no numbness, tingling, or focal motor weakness.  She has had no vertigo or difficulty with balance.  She notes no recent trauma or fevers.  She notes she has had \"migraine\" headaches in the past with the last one happening last year.  She went to urgent care and thinks she may have gotten CT at that point but was treated with medication and sent home.  She has been trying Benadryl, Advil, and Tylenol at home but headache only temporarily gets better.  She notes there are no significant aggravating factors and they can wax and wane without provocation.  She notes that sometimes wakes her up at night.  EXTERNAL RECORDS REVIEWED  Reviewed office visits to [place] urgent care in October 2022 for headache with very similar character and severity.  CT was done at that time which showed no acute findings.  Specifically there were no findings to suggest intracranial hemorrhage, hydrocephalus or space-occupying lesion.  ROS  Constitutional: No fevers or chills  Skin: No rashes  HEENT: No sore throat, runny nose  Neck: Posterior neck pain  Chest: No pain or rashes  Pulm: No shortness of breath, cough, wheezing, stridor, or pain with inspiration/expiration  Gastrointestinal: No vomiting, diarrhea, or abdominal pain.  Genitourinary: No dysuria or hematuria  Musculoskeletal: No pain, swelling, or focal weakness  Neurologic: " No sensory or focal motor changes to extremities. No confusion or disorientation.  No difficulty with balance, speech, or swallowing.  Heme: No bleeding or bruising problems.   Immuno: No hx of recurrent infections        LIMITATION TO HISTORY   None  OUTSIDE HISTORIAN(S):  None        PAST FAM HISTORY  Family History   Problem Relation Age of Onset    Diabetes Father        PAST MEDICAL HISTORY   has a past medical history of Anemia, Anesthesia, Arthritis, Bowel habit changes, Gynecological disorder, Hemorrhoids, and Obesity.    SOCIAL HISTORY  Social History     Tobacco Use    Smoking status: Never    Smokeless tobacco: Never   Vaping Use    Vaping Use: Never used   Substance and Sexual Activity    Alcohol use: No     Alcohol/week: 0.0 oz    Drug use: No    Sexual activity: Yes     Partners: Male       SURGICAL HISTORY   has a past surgical history that includes cholecystectomy; tonsillectomy; reconstruction, knee, acl; knee arthroscopy; gastric bypass laparoscopic (10/12/2015); us-needle core bx-breast panel; vaginal hysterectomy scope total (6/9/2016); and cystoscopy (N/A, 6/9/2016).    CURRENT MEDICATIONS  Home Medications       Reviewed by Suzanne Ahn R.N. (Registered Nurse) on 07/22/23 at 0827  Med List Status: Complete     Medication Last Dose Status   acetaminophen (TYLENOL) 500 MG Tab  Active   ibuprofen (MOTRIN) 200 MG Tab  Active                     ALLERGIES  Allergies   Allergen Reactions    Amoxicillin      rashes    Penicillins      rashes    Tetracycline      rashes    Augmentin Rash     .       PHYSICAL EXAM  VITAL SIGNS: BP (!) 141/90   Pulse (!) 52   Temp 36.8 °C (98.2 °F) (Temporal)   Resp 20   Wt 113 kg (249 lb 12.5 oz)   LMP 05/16/2016 (Exact Date)   SpO2 95%   BMI 42.87 kg/m²    Gen: Appears tired, mildly anxious and borderline tearful  HEENT: No signs of trauma, Bilateral external ears normal, Nose normal. Conjunctiva normal, Non-icteric.  PERRLA, EOMI  Neck:  No tenderness,  "Supple, No masses.  Patient able to range neck in flexion, extension, and rotation without apparent limitation or distress.  Lymphatic: No cervical lymphadenopathy noted.   Cardiovascular: Regular rate and rhythm, no murmurs.  Capillary refill less than 3 seconds to all extremities, 2+ distal pulses.  Thorax & Lungs: Normal breath sounds, No respiratory distress, No wheezing bilateral chest rise  Abdomen: Bowel sounds normal, Soft, No tenderness, No masses, No pulsatile masses. No Guarding or rebound  Skin: Warm, Dry, No erythema, No rash noted to exposed areas.   Extremities: Intact distal pulses, No edema  Neurologic: Appears tired but otherwise appears oriented and appropriate, no facial droop, grossly normal coordination and strength    INITIAL IMPRESSION  Patient appears to have a rather prolonged headache, possibly migraine.  She notes no throbbing sensation but does note that her neck is painful.  She has had this before and it was treated as a migraine.  She notes no stiffness and she does not have actual meningismus.  She has had no fevers and I do not suspect meningitis.  I do not feel lumbar puncture will benefit the patient or  but I did discuss diagnostic options with her.  The headache was gradual in onset and not \"thunderclap\" arguing against subarachnoid hemorrhage.  Her previous CT was normal and I do not feel another will benefit her  unless she has deteriorating symptoms or empiric treatment with a migraine cocktail does not work.  We will give the patient a liter of normal saline, Reglan, Benadryl, and Toradol.  I will reevaluate the patient after this.  At this point she is hypertensive but only mildly so.  This is likely situational due to her fatigue and pain/distress.  We will trend this but not actively treated at this point.  I do not suspect endorgan dysfunction but, again, I will discuss diagnostic options if she does not improve after the migraine " "cocktail.          COURSE & MEDICAL DECISION MAKING  Pertinent Labs & Imaging studies reviewed. (See chart for details)  ED observation? No  9:55 AM  Reevaluated the patient at bedside.  She is tired appearing but otherwise conversant and appears oriented.  She states her headache is \"much better\" and she has developed no interval change in her symptoms.  She states understanding this is likely headache of benign etiology, possibly migraine or tension headache.  It is very unlikely to be related to subarachnoid hemorrhage, meningitis, or hydrocephalus.  For this reason I do not feel repeat CT imaging or MRI to evaluate for even less likely issues, such as dural sinus thrombosis, is necessary a she does not have any associated neurologic symptoms.  She states understanding of the fact that I felt it was reasonable for her to go home and attempt to get some good sleep at this point.  I will give her a prescription for Reglan and she will be encouraged to take it for nausea and for headache if it recurs.  She is comfortable with this plan as well as the plan for avoiding any further imaging or laboratory evaluation as it will be unlikely to change emergent management.  She was encouraged to follow-up with her primary care physician as this appears to be a recurrent issue for her.  Unclear if suppressive migraine therapy will be beneficial to her but I I feel it is reasonable to discuss this with her primary care physician.  At this point I do not feel inpatient treatment or emergent neurologic consultation was necessary.    I have discussed management of the patient with the following physicians and MANSI's: None    Escalation of care considered, and ultimately not performed:blood analysis and diagnostic imaging    Barriers to care at this time, including but not limited to: None.     Decision tools and Rx drugs considered including, but not limited to : Reglan    Discussion of management with other [facility] or appropriate " source(s): None    The patient will not drink alcohol nor drive with prescribed medications. The patient will return for worsening symptoms and is stable at the time of discharge. The patient verbalizes understanding and will comply.    FINAL IMPRESSION  1. Nonintractable headache, unspecified chronicity pattern, unspecified headache type        Electronically signed by: Calvin Montero M.D., 7/22/2023 8:57 AM

## 2023-07-27 ENCOUNTER — APPOINTMENT (OUTPATIENT)
Dept: RADIOLOGY | Facility: IMAGING CENTER | Age: 53
End: 2023-07-27
Attending: NURSE PRACTITIONER
Payer: COMMERCIAL

## 2023-07-27 ENCOUNTER — OFFICE VISIT (OUTPATIENT)
Dept: URGENT CARE | Facility: PHYSICIAN GROUP | Age: 53
End: 2023-07-27
Payer: COMMERCIAL

## 2023-07-27 VITALS
WEIGHT: 251 LBS | TEMPERATURE: 98.6 F | SYSTOLIC BLOOD PRESSURE: 142 MMHG | HEART RATE: 85 BPM | OXYGEN SATURATION: 96 % | DIASTOLIC BLOOD PRESSURE: 96 MMHG | HEIGHT: 64 IN | RESPIRATION RATE: 16 BRPM | BODY MASS INDEX: 42.85 KG/M2

## 2023-07-27 DIAGNOSIS — R51.9 HEADACHE DISORDER: ICD-10-CM

## 2023-07-27 DIAGNOSIS — M54.2 NECK PAIN: ICD-10-CM

## 2023-07-27 PROCEDURE — 3080F DIAST BP >= 90 MM HG: CPT | Performed by: NURSE PRACTITIONER

## 2023-07-27 PROCEDURE — 72040 X-RAY EXAM NECK SPINE 2-3 VW: CPT | Mod: TC,FY | Performed by: RADIOLOGY

## 2023-07-27 PROCEDURE — 3077F SYST BP >= 140 MM HG: CPT | Performed by: NURSE PRACTITIONER

## 2023-07-27 PROCEDURE — 99214 OFFICE O/P EST MOD 30 MIN: CPT | Performed by: NURSE PRACTITIONER

## 2023-07-27 RX ORDER — CYCLOBENZAPRINE HCL 5 MG
5-10 TABLET ORAL
Qty: 20 TABLET | Refills: 0 | Status: SHIPPED | OUTPATIENT
Start: 2023-07-27 | End: 2023-08-16

## 2023-07-27 RX ORDER — BUTALBITAL, ACETAMINOPHEN AND CAFFEINE 50; 325; 40 MG/1; MG/1; MG/1
1 TABLET ORAL EVERY 6 HOURS PRN
Qty: 40 TABLET | Refills: 0 | Status: SHIPPED | OUTPATIENT
Start: 2023-07-27 | End: 2023-08-06

## 2023-07-27 RX ORDER — DIPHENHYDRAMINE HCL 25 MG
25 TABLET ORAL EVERY 6 HOURS PRN
COMMUNITY

## 2023-07-27 ASSESSMENT — ENCOUNTER SYMPTOMS
NECK PAIN: 1
MYALGIAS: 0
DIARRHEA: 0
PHOTOPHOBIA: 1
FEVER: 0
BLURRED VISION: 0
NAUSEA: 0
DIZZINESS: 0
DOUBLE VISION: 0
CHILLS: 0
HEADACHES: 1

## 2023-07-27 ASSESSMENT — FIBROSIS 4 INDEX: FIB4 SCORE: 0.99

## 2023-07-27 NOTE — PROGRESS NOTES
Subjective     Jen Mejía is a 52 y.o. female who presents with Headache (X 13 days.  Pt. Was seen in ER on 07-22-23 and was told to follow up with PCP but PCP cancelled due to illness.  She was told she had high /90.  She said on 07-04-23 she had a fall and fell face first and has had neck pain since than. )            HPI  New problem.  Patient is a 52-year-old female who presents with headache for the past 13 days.  She was seen in the ER on the 22nd of this month for this issue and was prescribed a migraine cocktail which worked for her however every time her meds wear off the headache comes back.  She says the headache starts in the back of her head near her neck and migrates up and over.  She denies any nausea, vomiting, dizziness, or balance issues.  She does have  some very mild photosensitivity.  She notes that with the ibuprofen, Reglan, and Benadryl she is starting to have some stomach upset with these medications.  She was also told in the emergency room that her blood pressure was 185/90 however on recheck in the ER it was 141/90.  She has been advised by me to continue to monitor this at home.    Amoxicillin, Penicillins, Tetracycline, and Augmentin  Current Outpatient Medications on File Prior to Visit   Medication Sig Dispense Refill    diphenhydrAMINE (BENADRYL) 25 MG Tab Take 25 mg by mouth every 6 hours as needed for Sleep.      acetaminophen (TYLENOL) 500 MG Tab Take 1,000 mg by mouth every 6 hours as needed.      ibuprofen (MOTRIN) 200 MG Tab Take 600 mg by mouth every 6 hours as needed.      metoclopramide (REGLAN) 10 MG Tab Take 1 Tablet by mouth 4 times a day as needed (nausea, headache). 20 Tablet 0     No current facility-administered medications on file prior to visit.     Social History     Socioeconomic History    Marital status:      Spouse name: Not on file    Number of children: Not on file    Years of education: Not on file    Highest education level: 12th grade    Occupational History    Not on file   Tobacco Use    Smoking status: Never    Smokeless tobacco: Never   Vaping Use    Vaping Use: Never used   Substance and Sexual Activity    Alcohol use: No     Alcohol/week: 0.0 oz    Drug use: No    Sexual activity: Yes     Partners: Male   Other Topics Concern     Service No    Blood Transfusions No    Caffeine Concern No    Occupational Exposure No    Hobby Hazards No    Sleep Concern Yes    Stress Concern Yes    Weight Concern Yes    Special Diet No    Back Care No    Exercise Yes    Bike Helmet Yes    Seat Belt Yes    Self-Exams Yes   Social History Narrative    Not on file     Social Determinants of Health     Financial Resource Strain: Low Risk  (10/21/2022)    Overall Financial Resource Strain (CARDIA)     Difficulty of Paying Living Expenses: Not very hard   Food Insecurity: No Food Insecurity (10/21/2022)    Hunger Vital Sign     Worried About Running Out of Food in the Last Year: Never true     Ran Out of Food in the Last Year: Never true   Transportation Needs: No Transportation Needs (10/21/2022)    PRAPARE - Transportation     Lack of Transportation (Medical): No     Lack of Transportation (Non-Medical): No   Physical Activity: Insufficiently Active (10/21/2022)    Exercise Vital Sign     Days of Exercise per Week: 2 days     Minutes of Exercise per Session: 30 min   Stress: Stress Concern Present (10/21/2022)    [place] [employer] - Occupational Stress Questionnaire     Feeling of Stress : To some extent   Social Connections: Moderately Isolated (10/21/2022)    Social Connection and Isolation Panel [NHANES]     Frequency of Communication with Friends and Family: Twice a week     Frequency of Social Gatherings with Friends and Family: Three times a week     Attends Gnosticist Services: Never     Active Member of Clubs or Organizations: Yes     Attends Club or Organization Meetings: More than 4 times per year     Marital Status:  "   Intimate Partner Violence: Not on file   Housing Stability: Low Risk  (10/21/2022)    Housing Stability Vital Sign     Unable to Pay for Housing in the Last Year: No     Number of Places Lived in the Last Year: 1     Unstable Housing in the Last Year: No     Breast Cancer-related family history is not on file.      Review of Systems   Constitutional:  Negative for chills and fever.   Eyes:  Positive for photophobia. Negative for blurred vision and double vision.   Gastrointestinal:  Negative for diarrhea and nausea.   Musculoskeletal:  Positive for neck pain. Negative for myalgias.   Neurological:  Positive for headaches. Negative for dizziness.              Objective     BP (!) 142/96   Pulse 85   Temp 37 °C (98.6 °F) (Temporal)   Resp 16   Ht 1.626 m (5' 4\")   Wt 114 kg (251 lb)   LMP 05/16/2016 (Exact Date)   SpO2 96%   BMI 43.08 kg/m²      Physical Exam  Vitals and nursing note reviewed.   Constitutional:       Appearance: Normal appearance.   Cardiovascular:      Rate and Rhythm: Normal rate and regular rhythm.      Heart sounds: No murmur heard.  Pulmonary:      Effort: Pulmonary effort is normal.      Breath sounds: Normal breath sounds.   Musculoskeletal:         General: Normal range of motion.   Skin:     General: Skin is warm and dry.   Neurological:      General: No focal deficit present.      Mental Status: She is alert and oriented to person, place, and time.      Cranial Nerves: No cranial nerve deficit.   Psychiatric:         Mood and Affect: Mood normal.         Behavior: Behavior normal.                             Assessment & Plan        1. Headache disorder  butalbital/apap/caffeine (FIORICET) -40 mg Tab    cyclobenzaprine (FLEXERIL) 5 mg tablet    Referral to Neurology      2. Neck pain  DX-CERVICAL SPINE-2 OR 3 VIEWS            Unremarkable  neck imaging.  Trial of fioricet and flexeril (nighttime only).  Referral to neurology.  Differential diagnosis, natural history, " supportive care, and indications for immediate follow-up were discussed.

## 2023-08-29 ENCOUNTER — TELEPHONE (OUTPATIENT)
Dept: HEALTH INFORMATION MANAGEMENT | Facility: OTHER | Age: 53
End: 2023-08-29
Payer: COMMERCIAL

## 2023-08-30 ENCOUNTER — TELEPHONE (OUTPATIENT)
Dept: NEUROLOGY | Facility: MEDICAL CENTER | Age: 53
End: 2023-08-30
Payer: COMMERCIAL

## 2023-08-31 ENCOUNTER — OFFICE VISIT (OUTPATIENT)
Dept: NEUROLOGY | Facility: MEDICAL CENTER | Age: 53
End: 2023-08-31
Attending: PSYCHIATRY & NEUROLOGY
Payer: COMMERCIAL

## 2023-08-31 ENCOUNTER — TELEPHONE (OUTPATIENT)
Dept: NEUROLOGY | Facility: MEDICAL CENTER | Age: 53
End: 2023-08-31

## 2023-08-31 VITALS
BODY MASS INDEX: 42.79 KG/M2 | SYSTOLIC BLOOD PRESSURE: 118 MMHG | WEIGHT: 250.66 LBS | HEIGHT: 64 IN | HEART RATE: 68 BPM | DIASTOLIC BLOOD PRESSURE: 74 MMHG | OXYGEN SATURATION: 98 % | TEMPERATURE: 97 F

## 2023-08-31 DIAGNOSIS — G43.101 MIGRAINE WITH AURA AND WITH STATUS MIGRAINOSUS, NOT INTRACTABLE: Primary | ICD-10-CM

## 2023-08-31 PROCEDURE — 3074F SYST BP LT 130 MM HG: CPT | Performed by: PSYCHIATRY & NEUROLOGY

## 2023-08-31 PROCEDURE — 99204 OFFICE O/P NEW MOD 45 MIN: CPT | Performed by: PSYCHIATRY & NEUROLOGY

## 2023-08-31 PROCEDURE — 99211 OFF/OP EST MAY X REQ PHY/QHP: CPT | Performed by: PSYCHIATRY & NEUROLOGY

## 2023-08-31 PROCEDURE — 3078F DIAST BP <80 MM HG: CPT | Performed by: PSYCHIATRY & NEUROLOGY

## 2023-08-31 RX ORDER — SUMATRIPTAN 100 MG/1
TABLET, FILM COATED ORAL
Qty: 9 TABLET | Refills: 11 | Status: SHIPPED | OUTPATIENT
Start: 2023-08-31 | End: 2023-09-30

## 2023-08-31 RX ORDER — ONDANSETRON 4 MG/1
4 TABLET, FILM COATED ORAL EVERY 4 HOURS PRN
Qty: 20 TABLET | Refills: 11 | Status: SHIPPED | OUTPATIENT
Start: 2023-08-31 | End: 2023-09-30

## 2023-08-31 RX ORDER — DEXAMETHASONE 2 MG/1
TABLET ORAL
Qty: 10 TABLET | Refills: 0 | Status: SHIPPED | OUTPATIENT
Start: 2023-08-31 | End: 2023-09-03

## 2023-08-31 ASSESSMENT — FIBROSIS 4 INDEX: FIB4 SCORE: 1.006230589874905363

## 2023-08-31 NOTE — PROGRESS NOTES
"[facility] NEUROLOGY  GENERAL NEUROLOGY  NEW PATIENT VISIT    Referral source: YARA Heath    CC: headache    HISTORY OF ILLNESS:  Jen Mejía is a 53 y.o. woman with a history most notable for migraine without aura and s/p gastric bypass.  Today, she was unaccompanied, and she provided the following history:    The following is a summary of headache symptoms, presented in my standard format:    Family History: mother  Age at onset (years): started in the early 1990s  Location: cervical, occipital  Radiation: top of head, holocephalic  Frequency: 0-1/month  Duration: most recent event lasted 20 days (7/14-8/8/2023  Headache Days/Month: average: 3/30, once yearly 20/30  Quality: \"pulsating\"  Intensity: maximum: 10/10, average: 5/10  Aura: visual (\"spots\" in visual field)  Photophobia/Phonophobia/Nausea/Vomiting: yes/no/yes/no  Provoked by Physical Activity?:   Triggers: none  Associated Symptoms: none  Autonomic Signs (such as ptosis, miosis, conjunctival injection, rhinorrhea, increased lacrimation): none  Head Trauma: none  Association with Menses: s/p hysterectomy  ED Visits: yes, most recently 7/22/2023  Hospitalizations:   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)  Sleep (hours/night): 6-7 hours/night  Caffeine Intake: 8 oz/day (1 cup/day)  Hydration: keeps well-hydrated  Nutrition: never hungry at breakfast, first meal 11:00-11:30  Exercise:   Analgesic Overuse:     Current Medication Regimen:  - ibuprofen:   - metoclopramide:   - Benadryl:     Medications Tried: Response  Preventive:  -     Rescue:  -     Medications Not Tried:  -     MEDICAL AND SURGICAL HISTORY:  Past Medical History:   Diagnosis Date    Anemia     Anesthesia     nausea    Arthritis     deg disc disease lower back    Bowel habit changes     constipation    Gynecological disorder     fibroids, irreg periods    Hemorrhoids     Obesity      Past Surgical History:   Procedure Laterality Date    VAGINAL " HYSTERECTOMY SCOPE TOTAL  6/9/2016    Procedure: VAGINAL HYSTERECTOMY SCOPE TOTAL, BILATERAL SALPINGECTOMY;  Surgeon: uHe Jeong M.D.;  Location: SURGERY SAME DAY [facility];  Service:     CYSTOSCOPY N/A 6/9/2016    Procedure: CYSTOSCOPY;  Surgeon: Hue Jeong M.D.;  Location: SURGERY SAME DAY [facility];  Service:     GASTRIC BYPASS LAPAROSCOPIC  10/12/2015    Procedure: GASTRIC BYPASS LAPAROSCOPIC SUSHIL EN Y;  Surgeon: John H Ganser, M.D.;  Location: SURGERY [facility];  Service:     CHOLECYSTECTOMY      KNEE ARTHROSCOPY      multiple    RECONSTRUCTION, KNEE, ACL      TONSILLECTOMY      US-NEEDLE CORE BX-BREAST PANEL       MEDICATIONS:  Current Outpatient Medications   Medication Sig    diphenhydrAMINE (BENADRYL) 25 MG Tab Take 25 mg by mouth every 6 hours as needed for Sleep.    acetaminophen (TYLENOL) 500 MG Tab Take 1,000 mg by mouth every 6 hours as needed.    ibuprofen (MOTRIN) 200 MG Tab Take 600 mg by mouth every 6 hours as needed.     SOCIAL HISTORY:  Social History     Tobacco Use    Smoking status: Never    Smokeless tobacco: Never   Substance Use Topics    Alcohol use: No     Alcohol/week: 0.0 oz     Social History     Social History Narrative    Not on file     FAMILY HISTORY:  Family History   Problem Relation Age of Onset    Diabetes Father      REVIEW OF SYSTEMS:  A ROS was completed.  Pertinent positives and negatives were included in the HPI, above.  All other systems were reviewed and are negative.    PHYSICAL EXAM:  General/Medical:  - NAD    Neuro:  MENTAL STATUS: awake and alert; no deficits of speech or language; oriented to conversation; affect was appropriate to situation; pleasant, cooperative    CRANIAL NERVES:    II: acuity: NT, fields: NT, pupils: NT, discs: sharp    III/IV/VI: versions: grossly intact    V: facial sensation: NT    VII: facial expression: symmetric    VIII: hearing: intact to voice    IX/X: palate: NT    XI: shoulder shrug: NT    XII: tongue:  NT    MOTOR:  - bulk: NT  - tone: NT  Upper Extremity Strength (R/L)    NT   Elbow flexion NT   Elbow extension NT   Shoulder abduction NT     Lower Extremity Strength (R/L)   Hip flexion NT   Knee extension NT   Knee flexion NT   Ankle plantarflexion NT   Ankle dorsiflexion NT     - pronator drift: NT  - abnormal movements: none    SENSATION:  - light touch: NT  - vibration (R/L, seconds): NT at the great toes  - pinprick: NT  - proprioception: NT  - Romberg: NT    COORDINATION:  - finger to nose: NT  - finger tapping: NT    REFLEXES:  Reflex Right Left   BR NT NT   Biceps NT NT   Triceps NT NT   Patellae NT NT   Achilles NT NT   Toes NT NT     GAIT:  - NT    REVIEW OF IMAGING STUDIES:  No data available.    REVIEW OF LABORATORY STUDIES:  No recent data available.    ASSESSMENT:  Jen Mejía is a 53 y.o. woman with episodic migraine w/ aura and status migrainosus.  Plans/recommendations as follows:    PLAN:  Episodic Migraine w/ Aura:  Prevention:  - try supplementing:  - magnesium: 400-600 mg once or 200-300 mg twice daily  - riboflavin (vitamin B2): 400 mg once daily  - coenzyme Q10: 300 mg daily  - get 7-9 hours of sleep per night; can try supplementing melatonin 2-10 mg, 2-3 hours before bedtime  - drink plenty of fluids (urine should be nearly clear)  - avoid excessive caffeine intake (no more than 2 servings per day and nothing in the afternoon)  - eat regular meals (don't skip meals)  - get moderate exercise (even just a 20 minute walk daily)    Rescue:  - sumatriptan 100 mg: take this at the onset of aura or headache pain; may re-dose x1 after 2 hours if headache persists; do not use more than 2 days/week  - ondansetron 4 mg: take this at the onset of aura or headache to prevent or reduce nausea; may re-dose after 2 hours if headache or nausea persist; do not use more often than 3 days/week  - do not use analgesics (e.g., ibuprofen, acetaminophen) more than 2 days per week in order to avoid  analgesic rebound headaches    - keep a headache log    Status Migrainosus:  Take the following (can be taken all at once if headache isn't going away!):  - dexamethasone taper (8, 6, 4, 2, mg/day)  - sumatriptan 100 mg (see instructions above)  - ondansetron 4 mg (see instructions above)  - ibuprofen/acetaminophen as well    Follow-Up:  - Return in about 4 months (around 12/31/2023).    Signed: Rory Amado M.D.

## 2023-10-10 ENCOUNTER — OFFICE VISIT (OUTPATIENT)
Dept: MEDICAL GROUP | Facility: PHYSICIAN GROUP | Age: 53
End: 2023-10-10
Payer: COMMERCIAL

## 2023-10-10 VITALS
RESPIRATION RATE: 18 BRPM | TEMPERATURE: 98.8 F | OXYGEN SATURATION: 94 % | BODY MASS INDEX: 42.09 KG/M2 | DIASTOLIC BLOOD PRESSURE: 90 MMHG | HEIGHT: 65 IN | SYSTOLIC BLOOD PRESSURE: 122 MMHG | HEART RATE: 65 BPM | WEIGHT: 252.6 LBS

## 2023-10-10 DIAGNOSIS — E78.00 ELEVATED LDL CHOLESTEROL LEVEL: ICD-10-CM

## 2023-10-10 DIAGNOSIS — D50.8 IRON DEFICIENCY ANEMIA SECONDARY TO INADEQUATE DIETARY IRON INTAKE: ICD-10-CM

## 2023-10-10 DIAGNOSIS — E55.9 VITAMIN D DEFICIENCY: ICD-10-CM

## 2023-10-10 DIAGNOSIS — Z23 NEED FOR VACCINATION: ICD-10-CM

## 2023-10-10 DIAGNOSIS — G43.009 MIGRAINE WITHOUT AURA AND WITHOUT STATUS MIGRAINOSUS, NOT INTRACTABLE: ICD-10-CM

## 2023-10-10 PROCEDURE — 3074F SYST BP LT 130 MM HG: CPT | Performed by: FAMILY MEDICINE

## 2023-10-10 PROCEDURE — 90472 IMMUNIZATION ADMIN EACH ADD: CPT | Performed by: FAMILY MEDICINE

## 2023-10-10 PROCEDURE — 90746 HEPB VACCINE 3 DOSE ADULT IM: CPT | Performed by: FAMILY MEDICINE

## 2023-10-10 PROCEDURE — 90686 IIV4 VACC NO PRSV 0.5 ML IM: CPT | Performed by: FAMILY MEDICINE

## 2023-10-10 PROCEDURE — 90471 IMMUNIZATION ADMIN: CPT | Performed by: FAMILY MEDICINE

## 2023-10-10 PROCEDURE — 99214 OFFICE O/P EST MOD 30 MIN: CPT | Mod: 25 | Performed by: FAMILY MEDICINE

## 2023-10-10 PROCEDURE — 3080F DIAST BP >= 90 MM HG: CPT | Performed by: FAMILY MEDICINE

## 2023-10-10 RX ORDER — SUMATRIPTAN 25 MG/1
25-100 TABLET, FILM COATED ORAL
COMMUNITY

## 2023-10-10 RX ORDER — CLOSTRIDIUM TETANI TOXOID ANTIGEN (FORMALDEHYDE INACTIVATED), CORYNEBACTERIUM DIPHTHERIAE TOXOID ANTIGEN (FORMALDEHYDE INACTIVATED), BORDETELLA PERTUSSIS TOXOID ANTIGEN (GLUTARALDEHYDE INACTIVATED), BORDETELLA PERTUSSIS FILAMENTOUS HEMAGGLUTININ ANTIGEN (FORMALDEHYDE INACTIVATED), BORDETELLA PERTUSSIS PERTACTIN ANTIGEN, AND BORDETELLA PERTUSSIS FIMBRIAE 2/3 ANTIGEN 5; 2; 2.5; 5; 3; 5 [LF]/.5ML; [LF]/.5ML; UG/.5ML; UG/.5ML; UG/.5ML; UG/.5ML
0.5 INJECTION, SUSPENSION INTRAMUSCULAR ONCE
Qty: 0.5 ML | Refills: 0 | Status: SHIPPED
Start: 2023-10-10 | End: 2023-10-10

## 2023-10-10 RX ORDER — DEXAMETHASONE 0.5 MG/5ML
ELIXIR ORAL DAILY
COMMUNITY

## 2023-10-10 ASSESSMENT — FIBROSIS 4 INDEX: FIB4 SCORE: 1.006230589874905363

## 2023-10-10 NOTE — ASSESSMENT & PLAN NOTE
She had been seen in ER in July with severe headache 10/10 and reglan helped but the headache returned, was seen in UC, toradol helped.  ref to neurology provided and treated with prn imitrex, dexamethasone but by that time, headache was gone and she did not need to take the medication.  She has in years past had headaches in the same season.   Today she is feeling well, no headache, vision changes.   She has follow up with neurology next month.

## 2023-10-10 NOTE — PROGRESS NOTES
Subjective:   Jen Mejía is a 53 y.o. female here today for evaluation and management of:     Migraine without aura and without status migrainosus, not intractable  She had been seen in ER in July with severe headache 10/10 and reglan helped but the headache returned, was seen in , toradol helped.  ref to neurology provided and treated with prn imitrex, dexamethasone but by that time, headache was gone and she did not need to take the medication.  She has in years past had headaches in the same season.   Today she is feeling well, no headache, vision changes.   She has follow up with neurology next month.              Current medicines (including changes today)  Current Outpatient Medications   Medication Sig Dispense Refill    tetanus-dipth-acell pertussis (ADACEL) 5-2-15.5 LF-MCG/0.5 Suspension Inject 0.5 mL into the shoulder, thigh, or buttocks one time for 1 dose. 0.5 mL 0    Zoster Vac Recomb Adjuvanted (SHINGRIX) 50 MCG/0.5ML Recon Susp Inject 0.5 mL into the shoulder, thigh, or buttocks one time for 1 dose. 0.5 mL 0    acetaminophen (TYLENOL) 500 MG Tab Take 1,000 mg by mouth every 6 hours as needed.      ibuprofen (MOTRIN) 200 MG Tab Take 600 mg by mouth every 6 hours as needed.      dexamethasone (DECADRON) 0.5 MG/5ML Elixir Take  by mouth every day. (Patient not taking: Reported on 10/10/2023)      ondansetron (ZOFRAN) 32 mg/50 mL Solution Infuse 32 mg into a venous catheter one time. (Patient not taking: Reported on 10/10/2023)      SUMAtriptan (IMITREX) 25 MG Tab tablet Take  mg by mouth one time as needed for Migraine. (Patient not taking: Reported on 10/10/2023)      diphenhydrAMINE (BENADRYL) 25 MG Tab Take 25 mg by mouth every 6 hours as needed for Sleep. (Patient not taking: Reported on 10/10/2023)       No current facility-administered medications for this visit.     She  has a past medical history of Anemia, Anesthesia, Arthritis, Bowel habit changes, Gynecological disorder,  "Hemorrhoids, and Obesity.    ROS  No chest pain, no shortness of breath, no abdominal pain       Objective:     BP (!) 122/90   Pulse 65   Temp 37.1 °C (98.8 °F) (Temporal)   Resp 18   Ht 1.651 m (5' 5\")   Wt 115 kg (252 lb 9.6 oz)   SpO2 94%  Body mass index is 42.03 kg/m².   Physical Exam:  Constitutional: Alert, no distress.  Skin: Warm, dry, good turgor, no rashes in visible areas.  Eye: Equal, round and reactive, conjunctiva clear, lids normal.  ENMT: Lips without lesions, good dentition, oropharynx clear.  Neck: Trachea midline, no masses, no thyromegaly. No cervical or supraclavicular lymphadenopathy  Respiratory: Unlabored respiratory effort, lungs clear to auscultation, no wheezes, no ronchi.  Cardiovascular: Normal S1, S2, no murmur, no edema.  Abdomen: Soft, non-tender, no masses, no hepatosplenomegaly.  Psych: Alert and oriented x3, normal affect and mood.        Assessment and Plan:   The following treatment plan was discussed    1. Migraine without aura and without status migrainosus, not intractable  - Comp Metabolic Panel; Future    2. Elevated LDL cholesterol level  - Lipid Profile; Future    3. Iron deficiency anemia secondary to inadequate dietary iron intake  - CBC WITH DIFFERENTIAL; Future  - IRON/TOTAL IRON BIND; Future  - FERRITIN; Future    4. Vitamin D deficiency  - VITAMIN D,25 HYDROXY (DEFICIENCY); Future    5. Need for vaccination  - tetanus-dipth-acell pertussis (ADACEL) 5-2-15.5 LF-MCG/0.5 Suspension; Inject 0.5 mL into the shoulder, thigh, or buttocks one time for 1 dose.  Dispense: 0.5 mL; Refill: 0  - Zoster Vac Recomb Adjuvanted (SHINGRIX) 50 MCG/0.5ML Recon Susp; Inject 0.5 mL into the shoulder, thigh, or buttocks one time for 1 dose.  Dispense: 0.5 mL; Refill: 0  - Hepatitis B Vaccine Adult 20+  - INFLUENZA VACCINE QUAD INJ (PF)    Other orders  - dexamethasone (DECADRON) 0.5 MG/5ML Elixir; Take  by mouth every day. (Patient not taking: Reported on 10/10/2023)  - ondansetron " (ZOFRAN) 32 mg/50 mL Solution; Infuse 32 mg into a venous catheter one time. (Patient not taking: Reported on 10/10/2023)  - SUMAtriptan (IMITREX) 25 MG Tab tablet; Take  mg by mouth one time as needed for Migraine. (Patient not taking: Reported on 10/10/2023)      Followup: Return in about 6 months (around 4/10/2024) for Lab Review.

## 2023-10-10 NOTE — PATIENT INSTRUCTIONS
Please get fasting labs, fasting for 8-10 hours before next visit. You can make an appointment for the lab or walk in.   Lab hours [facility] location: Monday to Friday 6 am - 4 pm, Saturday 7 am -noon  Even if you lose your lab paperwork, you can still come in to get your lab done.

## 2023-11-27 ENCOUNTER — OFFICE VISIT (OUTPATIENT)
Dept: NEUROLOGY | Facility: MEDICAL CENTER | Age: 53
End: 2023-11-27
Attending: PSYCHIATRY & NEUROLOGY
Payer: COMMERCIAL

## 2023-11-27 VITALS
BODY MASS INDEX: 42.17 KG/M2 | TEMPERATURE: 96.7 F | OXYGEN SATURATION: 95 % | WEIGHT: 253.09 LBS | HEART RATE: 83 BPM | SYSTOLIC BLOOD PRESSURE: 118 MMHG | DIASTOLIC BLOOD PRESSURE: 74 MMHG | HEIGHT: 65 IN

## 2023-11-27 DIAGNOSIS — G43.101 MIGRAINE WITH AURA AND WITH STATUS MIGRAINOSUS, NOT INTRACTABLE: ICD-10-CM

## 2023-11-27 PROCEDURE — 99213 OFFICE O/P EST LOW 20 MIN: CPT | Performed by: PSYCHIATRY & NEUROLOGY

## 2023-11-27 PROCEDURE — 3074F SYST BP LT 130 MM HG: CPT | Performed by: PSYCHIATRY & NEUROLOGY

## 2023-11-27 PROCEDURE — 3078F DIAST BP <80 MM HG: CPT | Performed by: PSYCHIATRY & NEUROLOGY

## 2023-11-27 PROCEDURE — 99211 OFF/OP EST MAY X REQ PHY/QHP: CPT | Performed by: PSYCHIATRY & NEUROLOGY

## 2023-11-27 ASSESSMENT — FIBROSIS 4 INDEX: FIB4 SCORE: 1.006230589874905363

## 2023-11-27 NOTE — PROGRESS NOTES
"[facility] NEUROLOGY  GENERAL NEUROLOGY  FOLLOW-UP VISIT    CC: episodic migraine w/ aura    INTERVAL HISTORY:  Jen Mejía is a 53 y.o. woman with episodic migraine w/ aura and status migrainosus.  I last saw her in the clinic on 8/31/2023.  At that time I recommended sumatriptan and ondansetron PRN.  Today, she was unaccompanied, and she provided the following interval history:    The following is a summary of headache symptoms, presented in my standard format:     Family History: mother  Age at onset (years): started in the early 1990s  Location: cervical, occipital  Radiation: top of head, holocephalic  Frequency: 0-1/month  Duration: most recent event lasted 20 days (7/14-8/8/2023)  Headache Days/Month: average: 3/30, once yearly 20/30  Quality: \"pulsating\"  Intensity: maximum: 10/10, average: 5/10  Aura: visual (\"spots\" in visual field)  Photophobia/Phonophobia/Nausea/Vomiting: yes/no/yes/no  Provoked by Physical Activity?:   Triggers: none  Associated Symptoms: none  Autonomic Signs (such as ptosis, miosis, conjunctival injection, rhinorrhea, increased lacrimation): none  Head Trauma: none  Association with Menses: s/p hysterectomy  ED Visits: yes, most recently 7/22/2023  Hospitalizations:   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    SUMAtriptan (IMITREX) 25 MG Tab tablet Take  mg by mouth one time as needed for Migraine.    acetaminophen (TYLENOL) " 500 MG Tab Take 1,000 mg by mouth every 6 hours as needed.    ibuprofen (MOTRIN) 200 MG Tab Take 600 mg by mouth every 6 hours as needed.    dexamethasone (DECADRON) 0.5 MG/5ML Elixir Take  by mouth every day. (Patient not taking: Reported on 10/10/2023)    diphenhydrAMINE (BENADRYL) 25 MG Tab Take 25 mg by mouth every 6 hours as needed for Sleep. (Patient not taking: Reported on 10/10/2023)     MEDICAL, SOCIAL, AND FAMILY HISTORY:  There is no change in the patient's ROS or medical, social, or family histories since the previous visit on 8/31/2023.    REVIEW OF SYSTEMS:  A ROS was completed.  Pertinent positives and negatives were included in the HPI, above.  All other systems were reviewed and are negative.    PHYSICAL EXAM:  General/Medical:  - NAD    Neuro:  MENTAL STATUS: awake and alert; no deficits of speech or language; oriented to conversation; affect was appropriate to situation; pleasant, cooperative    CRANIAL NERVES:    II: acuity: NT, fields: NT, pupils: NT, discs: NT    III/IV/VI: versions: grossly intact    V: facial sensation: NT    VII: facial expression: symmetric    VIII: hearing: intact to voice    IX/X: palate: NT    XI: shoulder shrug: NT    XII: tongue: NT    MOTOR:  - bulk: NT  - tone: NT  Upper Extremity Strength (R/L)    NT   Elbow flexion NT   Elbow extension NT   Shoulder abduction NT     Lower Extremity Strength (R/L)   Hip flexion NT   Knee extension NT   Knee flexion NT   Ankle plantarflexion NT   Ankle dorsiflexion NT     - pronator drift: NT  - abnormal movements: none    SENSATION:  - light touch: NT  - vibration (R/L, seconds): NT at the great toes  - pinprick: NT  - proprioception: NT  - Romberg: NT    COORDINATION:  - finger to nose: NT  - finger tapping: NT    REFLEXES:  Reflex Right Left   BR NT NT   Biceps NT NT   Triceps NT NT   Patellae NT NT   Achilles NT NT   Toes NT NT     GAIT:  - NT    REVIEW OF IMAGING STUDIES:  No additional data since the last visit.    REVIEW  OF LABORATORY STUDIES:  No additional data since the last visit.    ASSESSMENT:  Jen Mejía is a 53 y.o. woman with episodic migraine w/ aura and status migrainosus.  She remains headache free.  In case of recurrent headache w/ status migrainosus, plans/recommendations as follows:    PLAN:  Episodic Migraine w/ Aura:  Prevention:  - get 7-9 hours of sleep per night; can try supplementing melatonin 2-10 mg, 2-3 hours before bedtime  - drink plenty of fluids (urine should be nearly clear)  - avoid excessive caffeine intake (no more than 2 servings per day and nothing in the afternoon)  - eat regular meals (don't skip meals)  - get moderate exercise (even just a 20 minute walk daily)     Rescue:  - sumatriptan 100 mg: take this at the onset of aura or headache pain; may re-dose x1 after 2 hours if headache persists; do not use more than 2 days/week  - ondansetron 4 mg: take this at the onset of aura or headache to prevent or reduce nausea; may re-dose after 2 hours if headache or nausea persist; do not use more often than 3 days/week  - do not use analgesics (e.g., ibuprofen, acetaminophen) more than 2 days per week in order to avoid analgesic rebound headaches     - keep a headache log     Status Migrainosus:  Take the following (can be taken all at once if headache isn't going away!):  - dexamethasone taper (8, 6, 4, 2, mg/day)  - sumatriptan 100 mg (see instructions above)  - ondansetron 4 mg (see instructions above)  - ibuprofen/acetaminophen as well  - can call the clinic to be scheduled for a Toradol injection    Follow-Up:  - No follow-ups on file.    Signed: Rory Amado M.D.

## 2023-12-19 ENCOUNTER — HOSPITAL ENCOUNTER (OUTPATIENT)
Dept: RADIOLOGY | Facility: MEDICAL CENTER | Age: 53
End: 2023-12-19
Attending: OBSTETRICS & GYNECOLOGY
Payer: COMMERCIAL

## 2023-12-19 DIAGNOSIS — Z12.39 SCREENING BREAST EXAMINATION: ICD-10-CM

## 2023-12-19 PROCEDURE — 77063 BREAST TOMOSYNTHESIS BI: CPT

## 2024-02-26 ENCOUNTER — HOSPITAL ENCOUNTER (OUTPATIENT)
Dept: LAB | Facility: MEDICAL CENTER | Age: 54
End: 2024-02-26
Attending: FAMILY MEDICINE
Payer: COMMERCIAL

## 2024-02-26 DIAGNOSIS — E78.00 ELEVATED LDL CHOLESTEROL LEVEL: ICD-10-CM

## 2024-02-26 DIAGNOSIS — G43.009 MIGRAINE WITHOUT AURA AND WITHOUT STATUS MIGRAINOSUS, NOT INTRACTABLE: ICD-10-CM

## 2024-02-26 DIAGNOSIS — D50.8 IRON DEFICIENCY ANEMIA SECONDARY TO INADEQUATE DIETARY IRON INTAKE: ICD-10-CM

## 2024-02-26 DIAGNOSIS — E55.9 VITAMIN D DEFICIENCY: ICD-10-CM

## 2024-02-26 LAB
25(OH)D3 SERPL-MCNC: 26 NG/ML (ref 30–100)
ALBUMIN SERPL BCP-MCNC: 4 G/DL (ref 3.2–4.9)
ALBUMIN/GLOB SERPL: 1.5 G/DL
ALP SERPL-CCNC: 110 U/L (ref 30–99)
ALT SERPL-CCNC: 24 U/L (ref 2–50)
ANION GAP SERPL CALC-SCNC: 9 MMOL/L (ref 7–16)
AST SERPL-CCNC: 20 U/L (ref 12–45)
BASOPHILS # BLD AUTO: 0.8 % (ref 0–1.8)
BASOPHILS # BLD: 0.04 K/UL (ref 0–0.12)
BILIRUB SERPL-MCNC: 0.4 MG/DL (ref 0.1–1.5)
BUN SERPL-MCNC: 13 MG/DL (ref 8–22)
CALCIUM ALBUM COR SERPL-MCNC: 8.8 MG/DL (ref 8.5–10.5)
CALCIUM SERPL-MCNC: 8.8 MG/DL (ref 8.5–10.5)
CHLORIDE SERPL-SCNC: 107 MMOL/L (ref 96–112)
CHOLEST SERPL-MCNC: 165 MG/DL (ref 100–199)
CO2 SERPL-SCNC: 25 MMOL/L (ref 20–33)
CREAT SERPL-MCNC: 0.79 MG/DL (ref 0.5–1.4)
EOSINOPHIL # BLD AUTO: 0.17 K/UL (ref 0–0.51)
EOSINOPHIL NFR BLD: 3.5 % (ref 0–6.9)
ERYTHROCYTE [DISTWIDTH] IN BLOOD BY AUTOMATED COUNT: 43.3 FL (ref 35.9–50)
FASTING STATUS PATIENT QL REPORTED: NORMAL
FERRITIN SERPL-MCNC: 18 NG/ML (ref 10–291)
GFR SERPLBLD CREATININE-BSD FMLA CKD-EPI: 90 ML/MIN/1.73 M 2
GLOBULIN SER CALC-MCNC: 2.6 G/DL (ref 1.9–3.5)
GLUCOSE SERPL-MCNC: 95 MG/DL (ref 65–99)
HCT VFR BLD AUTO: 44.1 % (ref 37–47)
HDLC SERPL-MCNC: 53 MG/DL
HGB BLD-MCNC: 14.2 G/DL (ref 12–16)
IMM GRANULOCYTES # BLD AUTO: 0.01 K/UL (ref 0–0.11)
IMM GRANULOCYTES NFR BLD AUTO: 0.2 % (ref 0–0.9)
IRON SATN MFR SERPL: 17 % (ref 15–55)
IRON SERPL-MCNC: 56 UG/DL (ref 40–170)
LDLC SERPL CALC-MCNC: 95 MG/DL
LYMPHOCYTES # BLD AUTO: 1.54 K/UL (ref 1–4.8)
LYMPHOCYTES NFR BLD: 31.8 % (ref 22–41)
MCH RBC QN AUTO: 27.7 PG (ref 27–33)
MCHC RBC AUTO-ENTMCNC: 32.2 G/DL (ref 32.2–35.5)
MCV RBC AUTO: 86 FL (ref 81.4–97.8)
MONOCYTES # BLD AUTO: 0.38 K/UL (ref 0–0.85)
MONOCYTES NFR BLD AUTO: 7.8 % (ref 0–13.4)
NEUTROPHILS # BLD AUTO: 2.71 K/UL (ref 1.82–7.42)
NEUTROPHILS NFR BLD: 55.9 % (ref 44–72)
NRBC # BLD AUTO: 0 K/UL
NRBC BLD-RTO: 0 /100 WBC (ref 0–0.2)
PLATELET # BLD AUTO: 215 K/UL (ref 164–446)
PMV BLD AUTO: 13.4 FL (ref 9–12.9)
POTASSIUM SERPL-SCNC: 4.2 MMOL/L (ref 3.6–5.5)
PROT SERPL-MCNC: 6.6 G/DL (ref 6–8.2)
RBC # BLD AUTO: 5.13 M/UL (ref 4.2–5.4)
SODIUM SERPL-SCNC: 141 MMOL/L (ref 135–145)
TIBC SERPL-MCNC: 322 UG/DL (ref 250–450)
TRIGL SERPL-MCNC: 87 MG/DL (ref 0–149)
UIBC SERPL-MCNC: 266 UG/DL (ref 110–370)
WBC # BLD AUTO: 4.9 K/UL (ref 4.8–10.8)

## 2024-02-26 PROCEDURE — 83550 IRON BINDING TEST: CPT

## 2024-02-26 PROCEDURE — 82306 VITAMIN D 25 HYDROXY: CPT

## 2024-02-26 PROCEDURE — 83540 ASSAY OF IRON: CPT

## 2024-02-26 PROCEDURE — 82728 ASSAY OF FERRITIN: CPT

## 2024-02-26 PROCEDURE — 80061 LIPID PANEL: CPT

## 2024-02-26 PROCEDURE — 85025 COMPLETE CBC W/AUTO DIFF WBC: CPT

## 2024-02-26 PROCEDURE — 36415 COLL VENOUS BLD VENIPUNCTURE: CPT

## 2024-02-26 PROCEDURE — 80053 COMPREHEN METABOLIC PANEL: CPT

## 2024-05-30 ENCOUNTER — APPOINTMENT (OUTPATIENT)
Dept: MEDICAL GROUP | Facility: PHYSICIAN GROUP | Age: 54
End: 2024-05-30
Payer: COMMERCIAL

## 2024-05-30 VITALS
HEART RATE: 82 BPM | BODY MASS INDEX: 40.32 KG/M2 | WEIGHT: 242 LBS | DIASTOLIC BLOOD PRESSURE: 80 MMHG | OXYGEN SATURATION: 99 % | TEMPERATURE: 97.2 F | RESPIRATION RATE: 16 BRPM | HEIGHT: 65 IN | SYSTOLIC BLOOD PRESSURE: 122 MMHG

## 2024-05-30 DIAGNOSIS — R23.2 HOT FLASHES: ICD-10-CM

## 2024-05-30 DIAGNOSIS — R74.8 ELEVATED ALKALINE PHOSPHATASE LEVEL: ICD-10-CM

## 2024-05-30 DIAGNOSIS — D50.8 IRON DEFICIENCY ANEMIA SECONDARY TO INADEQUATE DIETARY IRON INTAKE: ICD-10-CM

## 2024-05-30 DIAGNOSIS — Z23 NEED FOR VACCINATION: ICD-10-CM

## 2024-05-30 ASSESSMENT — PATIENT HEALTH QUESTIONNAIRE - PHQ9: CLINICAL INTERPRETATION OF PHQ2 SCORE: 0

## 2024-05-30 ASSESSMENT — FIBROSIS 4 INDEX: FIB4 SCORE: 0.99

## 2024-05-30 NOTE — PROGRESS NOTES
"Subjective:   Jen Mejía is a 53 y.o. female here today for evaluation and management of:     Elevated alkaline phosphatase level  Mild elevation  Working on diet changes  Has some arthritis of hip  Check ggt, iso enzymes    Hot flashes  Hysterectomy with ovaries preserved in the past for benign reason.   Recently hot flashes affecting her sleep  Normal tsh 2022 repeat ordered.   Does not drink alcohol, does not have diarrhea, near syncope, flank pain, blood in urine or palpitations.   Cbc, cmp only with borderline elevated mpv and alk phos.   Advised try black cohosh, soy, vit E supplements.          Current medicines (including changes today)  Current Outpatient Medications   Medication Sig Dispense Refill    ondansetron (ZOFRAN) 32 mg/50 mL Solution Infuse 32 mg into a venous catheter one time.      SUMAtriptan (IMITREX) 25 MG Tab tablet Take  mg by mouth one time as needed for Migraine.      acetaminophen (TYLENOL) 500 MG Tab Take 1,000 mg by mouth every 6 hours as needed.      ibuprofen (MOTRIN) 200 MG Tab Take 600 mg by mouth every 6 hours as needed.       No current facility-administered medications for this visit.     She  has a past medical history of Anemia, Anesthesia, Arthritis, Bowel habit changes, Gynecological disorder, Hemorrhoids, and Obesity.    ROS  No chest pain, no shortness of breath, no abdominal pain       Objective:     /80   Pulse 82   Temp 36.2 °C (97.2 °F) (Temporal)   Resp 16   Ht 1.651 m (5' 5\")   Wt 110 kg (242 lb)   SpO2 99%  Body mass index is 40.27 kg/m².   Physical Exam:  Constitutional: Alert, no distress.  Skin: Warm, dry, good turgor, no rashes in visible areas.  Eye: Equal, round and reactive, conjunctiva clear, lids normal.  ENMT: Lips without lesions, good dentition, oropharynx clear.  Neck: Trachea midline, no masses, no thyromegaly. No cervical or supraclavicular lymphadenopathy  Respiratory: Unlabored respiratory effort, lungs clear to " auscultation, no wheezes, no ronchi.  Cardiovascular: Normal S1, S2, no murmur, no edema.  Abdomen: Soft, non-tender, no masses, no hepatosplenomegaly.  Psych: Alert and oriented x3, normal affect and mood.    Assessment and Plan:   The following treatment plan was discussed    1. Need for vaccination  - Hepatitis B Vaccine Adult 20+    2. Elevated alkaline phosphatase level  - HEPATIC FUNCTION PANEL; Future  - ALKALINE PHOSPHATASE ISOENZYMES; Future  - GAMMA GT (GGT); Future    3. Iron deficiency anemia secondary to inadequate dietary iron intake  - IRON/TOTAL IRON BIND; Future    4. Hot flashes  - TSH WITH REFLEX TO FT4; Future      Followup: Return in about 6 months (around 11/30/2024) for Lab Review.

## 2024-05-30 NOTE — ASSESSMENT & PLAN NOTE
Hysterectomy with ovaries preserved in the past for benign reason.   Recently hot flashes affecting her sleep  Normal tsh 2022 repeat ordered.   Does not drink alcohol, does not have diarrhea, near syncope, flank pain, blood in urine or palpitations.   Cbc, cmp only with borderline elevated mpv and alk phos.   Advised try black cohosh, soy, vit E supplements.

## 2024-06-03 ENCOUNTER — APPOINTMENT (OUTPATIENT)
Dept: NEUROLOGY | Facility: MEDICAL CENTER | Age: 54
End: 2024-06-03
Attending: PSYCHIATRY & NEUROLOGY
Payer: COMMERCIAL

## 2024-06-05 ENCOUNTER — OFFICE VISIT (OUTPATIENT)
Dept: URGENT CARE | Facility: PHYSICIAN GROUP | Age: 54
End: 2024-06-05
Payer: COMMERCIAL

## 2024-06-05 VITALS
TEMPERATURE: 97 F | SYSTOLIC BLOOD PRESSURE: 122 MMHG | RESPIRATION RATE: 14 BRPM | HEART RATE: 74 BPM | DIASTOLIC BLOOD PRESSURE: 68 MMHG | HEIGHT: 64 IN | BODY MASS INDEX: 42 KG/M2 | WEIGHT: 246 LBS | OXYGEN SATURATION: 98 %

## 2024-06-05 DIAGNOSIS — J06.9 URI WITH COUGH AND CONGESTION: ICD-10-CM

## 2024-06-05 LAB
FLUAV RNA SPEC QL NAA+PROBE: NEGATIVE
FLUBV RNA SPEC QL NAA+PROBE: NEGATIVE
RSV RNA SPEC QL NAA+PROBE: NEGATIVE
SARS-COV-2 RNA RESP QL NAA+PROBE: NEGATIVE

## 2024-06-05 PROCEDURE — 0241U POCT CEPHEID COV-2, FLU A/B, RSV - PCR: CPT | Performed by: NURSE PRACTITIONER

## 2024-06-05 PROCEDURE — 3078F DIAST BP <80 MM HG: CPT | Performed by: NURSE PRACTITIONER

## 2024-06-05 PROCEDURE — 3074F SYST BP LT 130 MM HG: CPT | Performed by: NURSE PRACTITIONER

## 2024-06-05 PROCEDURE — 99213 OFFICE O/P EST LOW 20 MIN: CPT | Performed by: NURSE PRACTITIONER

## 2024-06-05 RX ORDER — DEXTROMETHORPHAN HYDROBROMIDE AND PROMETHAZINE HYDROCHLORIDE 15; 6.25 MG/5ML; MG/5ML
5 SYRUP ORAL EVERY 4 HOURS PRN
Qty: 120 ML | Refills: 0 | Status: SHIPPED | OUTPATIENT
Start: 2024-06-05 | End: 2024-06-12

## 2024-06-05 RX ORDER — PREDNISONE 10 MG/1
10 TABLET ORAL DAILY
Qty: 5 TABLET | Refills: 0 | Status: SHIPPED | OUTPATIENT
Start: 2024-06-05 | End: 2024-06-10

## 2024-06-05 ASSESSMENT — ENCOUNTER SYMPTOMS
FEVER: 0
ABDOMINAL PAIN: 0
NAUSEA: 0
VOMITING: 0
CHILLS: 0
HEADACHES: 1
DIARRHEA: 0
COUGH: 1

## 2024-06-05 ASSESSMENT — FIBROSIS 4 INDEX: FIB4 SCORE: 0.99

## 2024-06-05 NOTE — PROGRESS NOTES
Subjective:     Jen Mejía is a 53 y.o. female who presents for Cough (X 4 days), Otalgia ((R) x 4 days), Pharyngitis, and Headache      Cough  Associated symptoms include ear pain and headaches. Pertinent negatives include no chills or fever.   Otalgia   Associated symptoms include coughing and headaches. Pertinent negatives include no abdominal pain, diarrhea or vomiting.   Pharyngitis   Associated symptoms include coughing, ear pain and headaches. Pertinent negatives include no abdominal pain, diarrhea or vomiting.   Evans Li is a very pleasant 53-year-old female who presents to urgent care today with complaints of an upper respiratory tract infection with symptoms ongoing for the past 4 days.  Her symptoms are progressively worsening.  She endorses a sore throat that is worse with swallowing, productive cough, congestion, fatigue and dizziness.  She states that she actually became dizzy and fell over yesterday afternoon.  She denies any injuries stemming from this fall.  She also endorses right-sided ear pain that is radiating into her jaw.  Negative for body aches.  She states that her brother was ill with similar symptoms.    Review of Systems   Constitutional:  Negative for chills and fever.   HENT:  Positive for ear pain.    Respiratory:  Positive for cough.    Gastrointestinal:  Negative for abdominal pain, diarrhea, nausea and vomiting.   Neurological:  Positive for headaches.       PMH:   Past Medical History:   Diagnosis Date    Anemia     Anesthesia     nausea    Arthritis     deg disc disease lower back    Bowel habit changes     constipation    Gynecological disorder     fibroids, irreg periods    Hemorrhoids     Obesity      ALLERGIES:   Allergies   Allergen Reactions    Amoxicillin      rashes    Penicillins      rashes    Tetracycline      rashes    Augmentin Rash     .     SURGHX:   Past Surgical History:   Procedure Laterality Date    VAGINAL HYSTERECTOMY SCOPE TOTAL  6/9/2016     Procedure: VAGINAL HYSTERECTOMY SCOPE TOTAL, BILATERAL SALPINGECTOMY;  Surgeon: Hue Jeong M.D.;  Location: SURGERY SAME DAY [facility] ORS;  Service:     CYSTOSCOPY N/A 6/9/2016    Procedure: CYSTOSCOPY;  Surgeon: Hue Jeong M.D.;  Location: SURGERY SAME DAY [facility] ORS;  Service:     GASTRIC BYPASS LAPAROSCOPIC  10/12/2015    Procedure: GASTRIC BYPASS LAPAROSCOPIC SUSHIL EN Y;  Surgeon: John H Ganser, M.D.;  Location: SURGERY [facility];  Service:     CHOLECYSTECTOMY      KNEE ARTHROSCOPY      multiple    RECONSTRUCTION, KNEE, ACL      TONSILLECTOMY      US-NEEDLE CORE BX-BREAST PANEL       SOCHX:   Social History     Socioeconomic History    Marital status:     Highest education level: 12th grade   Tobacco Use    Smoking status: Never    Smokeless tobacco: Never   Vaping Use    Vaping status: Never Used   Substance and Sexual Activity    Alcohol use: No     Comment: rarely    Drug use: No    Sexual activity: Yes     Partners: Male   Other Topics Concern     Service No    Blood Transfusions No    Caffeine Concern No    Occupational Exposure No    Hobby Hazards No    Sleep Concern Yes    Stress Concern Yes    Weight Concern Yes    Special Diet No    Back Care No    Exercise Yes    Bike Helmet Yes    Seat Belt Yes    Self-Exams Yes     Social Determinants of Health     Financial Resource Strain: Low Risk  (10/21/2022)    Overall Financial Resource Strain (CARDIA)     Difficulty of Paying Living Expenses: Not very hard   Food Insecurity: No Food Insecurity (10/21/2022)    Hunger Vital Sign     Worried About Running Out of Food in the Last Year: Never true     Ran Out of Food in the Last Year: Never true   Transportation Needs: No Transportation Needs (10/21/2022)    PRAPARE - Transportation     Lack of Transportation (Medical): No     Lack of Transportation (Non-Medical): No   Physical Activity: Insufficiently Active (10/21/2022)    Exercise Vital Sign     Days of Exercise per Week: 2 days  "    Minutes of Exercise per Session: 30 min   Stress: Stress Concern Present (10/21/2022)    [employer] - Occupational Stress Questionnaire     Feeling of Stress : To some extent   Social Connections: Moderately Isolated (10/21/2022)    Social Connection and Isolation Panel [NHANES]     Frequency of Communication with Friends and Family: Twice a week     Frequency of Social Gatherings with Friends and Family: Three times a week     Attends Latter day Services: Never     Active Member of Clubs or Organizations: Yes     Attends Club or Organization Meetings: More than 4 times per year     Marital Status:    Housing Stability: Low Risk  (10/21/2022)    Housing Stability Vital Sign     Unable to Pay for Housing in the Last Year: No     Number of Places Lived in the Last Year: 1     Unstable Housing in the Last Year: No     FH:   Family History   Problem Relation Age of Onset    Diabetes Father          Objective:   /68   Pulse 74   Temp 36.1 °C (97 °F) (Temporal)   Resp 14   Ht 1.626 m (5' 4\")   Wt 112 kg (246 lb)   LMP 05/16/2016 (Exact Date)   SpO2 98%   BMI 42.23 kg/m²     Physical Exam  Vitals and nursing note reviewed.   Constitutional:       General: She is not in acute distress.     Appearance: Normal appearance. She is normal weight. She is ill-appearing. She is not toxic-appearing.   HENT:      Head: Normocephalic.      Right Ear: Tympanic membrane, ear canal and external ear normal.      Left Ear: Tympanic membrane, ear canal and external ear normal.      Ears:      Comments: Bilateral ears are mildly injected.     Nose: Congestion present. No rhinorrhea.      Mouth/Throat:      Mouth: Mucous membranes are moist.      Pharynx: Posterior oropharyngeal erythema present. No oropharyngeal exudate.      Comments: Tonsils are surgically absent.  Eyes:      General:         Right eye: No discharge.         Left eye: No discharge.      Pupils: Pupils are equal, round, " and reactive to light.   Cardiovascular:      Rate and Rhythm: Normal rate and regular rhythm.      Pulses: Normal pulses.      Heart sounds: Normal heart sounds.   Pulmonary:      Effort: Pulmonary effort is normal. No respiratory distress.      Breath sounds: No stridor. No wheezing, rhonchi or rales.   Chest:      Chest wall: No tenderness.   Abdominal:      General: Abdomen is flat.   Musculoskeletal:         General: Normal range of motion.      Cervical back: Normal range of motion and neck supple. Tenderness present.   Lymphadenopathy:      Cervical: Cervical adenopathy present.   Skin:     General: Skin is dry.   Neurological:      General: No focal deficit present.      Mental Status: She is alert and oriented to person, place, and time. Mental status is at baseline.   Psychiatric:         Mood and Affect: Mood normal.         Behavior: Behavior normal.         Thought Content: Thought content normal.         Judgment: Judgment normal.     Results for orders placed or performed in visit on 06/05/24   POCT CoV-2, Flu A/B, RSV by PCR   Result Value Ref Range    SARS-CoV-2 by PCR Negative Negative, Invalid    Influenza virus A RNA Negative Negative, Invalid    Influenza virus B, PCR Negative Negative, Invalid    RSV, PCR Negative Negative, Invalid       Assessment/Plan:   Assessment    1. URI with cough and congestion  promethazine-dextromethorphan (PROMETHAZINE-DM) 6.25-15 MG/5ML syrup    POCT CoV-2, Flu A/B, RSV by PCR    predniSONE (DELTASONE) 10 MG Tab        We discussed supportive measures including humidifier, warm salt water gargles, over-the-counter Cepacol throat lozenges, rest  and increased fluids. Pt was encouraged to seek treatment back in the ER or urgent care for worsening symptoms,  fever greater than 100.5, wheezes or shortness of breath.

## 2024-09-24 PROBLEM — M17.9 OSTEOARTHRITIS, KNEE: Status: ACTIVE | Noted: 2024-09-24

## 2024-11-08 ENCOUNTER — HOSPITAL ENCOUNTER (OUTPATIENT)
Dept: RADIOLOGY | Facility: MEDICAL CENTER | Age: 54
End: 2024-11-08
Attending: STUDENT IN AN ORGANIZED HEALTH CARE EDUCATION/TRAINING PROGRAM
Payer: COMMERCIAL

## 2024-11-08 DIAGNOSIS — M17.12 PRIMARY OSTEOARTHRITIS OF LEFT KNEE: ICD-10-CM

## 2024-11-08 PROCEDURE — 73700 CT LOWER EXTREMITY W/O DYE: CPT | Mod: LT

## 2024-11-26 ENCOUNTER — APPOINTMENT (OUTPATIENT)
Dept: MEDICAL GROUP | Facility: PHYSICIAN GROUP | Age: 54
End: 2024-11-26
Payer: COMMERCIAL

## 2025-01-07 ENCOUNTER — HOSPITAL ENCOUNTER (OUTPATIENT)
Dept: RADIOLOGY | Facility: MEDICAL CENTER | Age: 55
End: 2025-01-07
Attending: FAMILY MEDICINE
Payer: COMMERCIAL

## 2025-01-07 DIAGNOSIS — Z12.31 VISIT FOR SCREENING MAMMOGRAM: ICD-10-CM

## 2025-01-07 PROCEDURE — 77067 SCR MAMMO BI INCL CAD: CPT

## 2025-02-14 ENCOUNTER — HOSPITAL ENCOUNTER (OUTPATIENT)
Dept: LAB | Facility: MEDICAL CENTER | Age: 55
End: 2025-02-14
Attending: FAMILY MEDICINE
Payer: COMMERCIAL

## 2025-02-14 DIAGNOSIS — R23.2 HOT FLASHES: ICD-10-CM

## 2025-02-14 DIAGNOSIS — R74.8 ELEVATED ALKALINE PHOSPHATASE LEVEL: ICD-10-CM

## 2025-02-14 DIAGNOSIS — D50.8 IRON DEFICIENCY ANEMIA SECONDARY TO INADEQUATE DIETARY IRON INTAKE: ICD-10-CM

## 2025-02-14 PROCEDURE — 83550 IRON BINDING TEST: CPT

## 2025-02-14 PROCEDURE — 80076 HEPATIC FUNCTION PANEL: CPT

## 2025-02-14 PROCEDURE — 84080 ASSAY ALKALINE PHOSPHATASES: CPT

## 2025-02-14 PROCEDURE — 84443 ASSAY THYROID STIM HORMONE: CPT

## 2025-02-14 PROCEDURE — 84075 ASSAY ALKALINE PHOSPHATASE: CPT

## 2025-02-14 PROCEDURE — 83540 ASSAY OF IRON: CPT

## 2025-02-14 PROCEDURE — 82977 ASSAY OF GGT: CPT

## 2025-02-14 PROCEDURE — 36415 COLL VENOUS BLD VENIPUNCTURE: CPT

## 2025-02-15 LAB
ALBUMIN SERPL BCP-MCNC: 4.2 G/DL (ref 3.2–4.9)
ALP SERPL-CCNC: 116 U/L (ref 30–99)
ALT SERPL-CCNC: 23 U/L (ref 2–50)
AST SERPL-CCNC: 22 U/L (ref 12–45)
BILIRUB CONJ SERPL-MCNC: <0.2 MG/DL (ref 0.1–0.5)
BILIRUB INDIRECT SERPL-MCNC: ABNORMAL MG/DL (ref 0–1)
BILIRUB SERPL-MCNC: 0.4 MG/DL (ref 0.1–1.5)
GGT SERPL-CCNC: 24 U/L (ref 7–34)
IRON SATN MFR SERPL: 19 % (ref 15–55)
IRON SERPL-MCNC: 69 UG/DL (ref 40–170)
PROT SERPL-MCNC: 7.2 G/DL (ref 6–8.2)
TIBC SERPL-MCNC: 364 UG/DL (ref 250–450)
TSH SERPL DL<=0.005 MIU/L-ACNC: 3.43 UIU/ML (ref 0.38–5.33)
UIBC SERPL-MCNC: 295 UG/DL (ref 110–370)

## 2025-02-18 ENCOUNTER — RESULTS FOLLOW-UP (OUTPATIENT)
Dept: MEDICAL GROUP | Facility: PHYSICIAN GROUP | Age: 55
End: 2025-02-18

## 2025-02-18 LAB
ALP BONE SERPL-CCNC: 48 U/L (ref 0–55)
ALP ISOS SERPL HS-CCNC: 0 U/L
ALP LIVER SERPL-CCNC: 82 U/L (ref 0–94)
ALP SERPL-CCNC: 130 U/L (ref 40–120)

## 2025-02-19 ENCOUNTER — OFFICE VISIT (OUTPATIENT)
Dept: MEDICAL GROUP | Facility: PHYSICIAN GROUP | Age: 55
End: 2025-02-19
Payer: COMMERCIAL

## 2025-02-19 VITALS
RESPIRATION RATE: 16 BRPM | SYSTOLIC BLOOD PRESSURE: 122 MMHG | HEIGHT: 64 IN | TEMPERATURE: 98.6 F | BODY MASS INDEX: 41.83 KG/M2 | WEIGHT: 245 LBS | OXYGEN SATURATION: 97 % | HEART RATE: 74 BPM | DIASTOLIC BLOOD PRESSURE: 80 MMHG

## 2025-02-19 DIAGNOSIS — Z12.83 SKIN CANCER SCREENING: ICD-10-CM

## 2025-02-19 DIAGNOSIS — Z23 NEED FOR VACCINATION: ICD-10-CM

## 2025-02-19 DIAGNOSIS — R74.8 ELEVATED ALKALINE PHOSPHATASE LEVEL: ICD-10-CM

## 2025-02-19 DIAGNOSIS — D50.8 IRON DEFICIENCY ANEMIA SECONDARY TO INADEQUATE DIETARY IRON INTAKE: ICD-10-CM

## 2025-02-19 DIAGNOSIS — E55.9 VITAMIN D DEFICIENCY: ICD-10-CM

## 2025-02-19 DIAGNOSIS — E78.00 ELEVATED LDL CHOLESTEROL LEVEL: ICD-10-CM

## 2025-02-19 DIAGNOSIS — Z11.59 ENCOUNTER FOR HEPATITIS C SCREENING TEST FOR LOW RISK PATIENT: ICD-10-CM

## 2025-02-19 PROCEDURE — 90656 IIV3 VACC NO PRSV 0.5 ML IM: CPT

## 2025-02-19 PROCEDURE — 99214 OFFICE O/P EST MOD 30 MIN: CPT | Mod: 25

## 2025-02-19 PROCEDURE — 90677 PCV20 VACCINE IM: CPT

## 2025-02-19 PROCEDURE — 90471 IMMUNIZATION ADMIN: CPT

## 2025-02-19 PROCEDURE — 90472 IMMUNIZATION ADMIN EACH ADD: CPT

## 2025-02-19 ASSESSMENT — FIBROSIS 4 INDEX: FIB4 SCORE: 1.13

## 2025-02-19 ASSESSMENT — PATIENT HEALTH QUESTIONNAIRE - PHQ9: CLINICAL INTERPRETATION OF PHQ2 SCORE: 0

## 2025-02-20 NOTE — Clinical Note
REFERRAL APPROVAL NOTICE         Sent on February 20, 2025                   Jen Mejía  [address]  [place] [address]                   Dear Ms. Kym,    After a careful review of the medical information and benefit coverage, [facility] has processed your referral. See below for additional details.    If applicable, you must be actively enrolled with your insurance for coverage of the authorized service. If you have any questions regarding your coverage, please contact your insurance directly.    REFERRAL INFORMATION   Referral #:  [id number]  Referred-To Service Location    Referred-By Provider:  Dermatology    Lemuel Wolfe M.D.   [facility]      [facility] [place] [address]  [phone] [address] [address]  [phone]    Referral Start Date:  02/19/2025  Referral End Date:   02/19/2026             SCHEDULING  If you do not already have an appointment, please call [phone] to make an appointment.     MORE INFORMATION  If you do not already have a [facility] account, sign up at: [email or web address]  You can access your medical information, make appointments, see lab results, billing information, and more.  If you have questions regarding this referral, please contact  the [facility] Referrals department at:             [phone]. Monday - Friday 8:00AM - 5:00PM.     Sincerely,    [facility]

## 2025-02-20 NOTE — ASSESSMENT & PLAN NOTE
Alkaline phosphatase level shows improvement, alk phos isoenzymes show no elevation of liver or bone.  Raheem labs ordered for annual with healthy diet changes.

## 2025-02-20 NOTE — PROGRESS NOTES
Subjective:   Jen Mejía is a 54 y.o. female here today for evaluation and management of:     Elevated alkaline phosphatase level  Alkaline phosphatase level shows improvement, alk phos isoenzymes show no elevation of liver or bone.  Raheem labs ordered for annual with healthy diet changes.    Iron deficiency anemia secondary to inadequate dietary iron intake  Iron levels normal at 69 improved from prior.  Patient had normal H&H in 2020 for CBC.     Referral to dermatology for skin cancer screening provided continue with sunscreen.    Influenza vaccine provided in clinic today.  Advised to get shingles and tetanus vaccine at the pharmacy.    Hepatitis C screening ordered.    Current medicines (including changes today)  Current Outpatient Medications   Medication Sig Dispense Refill    Zoster Vac Recomb Adjuvanted (SHINGRIX) 50 MCG/0.5ML Recon Susp Inject 0.5 mL into the shoulder, thigh, or buttocks one time for 1 dose. 0.5 mL 0    gabapentin (NEURONTIN) 100 MG Cap TAKE 1 CAPSULE BY MOUTH THREE TIMES DAILY 90 Capsule 0    clindamycin (CLEOCIN) 300 MG Cap Take 2 Capsules by mouth one time as needed (Take 1 hour before dental procedure) for up to 1 dose. 2 Capsule 0    meloxicam (MOBIC) 15 MG tablet Take 1 Tablet by mouth every day. 30 Tablet 1    acetaminophen (TYLENOL) 500 MG Tab Take 2 Tablets by mouth every 8 hours. 90 Tablet 1    docusate sodium (COLACE) 100 MG Cap Take 1 Capsule by mouth 2 times a day. 60 Capsule 0    SUMAtriptan (IMITREX) 25 MG Tab tablet Take  mg by mouth one time as needed for Migraine.       No current facility-administered medications for this visit.     She  has a past medical history of Anemia, Anesthesia, Arthritis, Bowel habit changes, Gynecological disorder, Hemorrhoids, Obesity, and PONV (postoperative nausea and vomiting).    ROS  No chest pain, no shortness of breath, no abdominal pain       Objective:     /80 (BP Location: Right arm, Patient Position: Sitting, BP  "Cuff Size: Adult long)   Pulse 74   Temp 37 °C (98.6 °F) (Temporal)   Resp 16   Ht 1.626 m (5' 4\")   Wt 111 kg (245 lb)   SpO2 97%  Body mass index is 42.05 kg/m².   Physical Exam:  Constitutional: Alert, no distress.  Skin: Warm, dry, good turgor, no rashes in visible areas.  Eye: Equal, round and reactive, conjunctiva clear, lids normal.  ENMT: Lips without lesions, good dentition, oropharynx clear.  Neck: Trachea midline, no masses, no thyromegaly. No cervical or supraclavicular lymphadenopathy  Respiratory: Unlabored respiratory effort, lungs clear to auscultation, no wheezes, no ronchi.  Cardiovascular: Normal S1, S2, no murmur, no edema.  Abdomen: Soft, non-tender, no masses, no hepatosplenomegaly.  Psych: Alert and oriented x3, normal affect and mood.    Assessment and Plan:   The following treatment plan was discussed    1. Need for vaccination  - INFLUENZA VACCINE TRI INJ (PF)   - Pneumococcal Conjugate Vaccine 20-Valent (6 wks+)    2. Encounter for hepatitis C screening test for low risk patient  - HCV Scrn ( [id number] 1xLife - Medicare Patients Only); Future    3. Skin cancer screening  - Referral to Dermatology    4. Vitamin D deficiency  - VITAMIN D,25 HYDROXY (DEFICIENCY); Future    5. Elevated LDL cholesterol level  - Comp Metabolic Panel; Future  - Lipid Profile; Future    6. Elevated alkaline phosphatase level  - Comp Metabolic Panel; Future    7. Iron deficiency anemia secondary to inadequate dietary iron intake    Other orders  - Zoster Vac Recomb Adjuvanted (SHINGRIX) 50 MCG/0.5ML Recon Susp; Inject 0.5 mL into the shoulder, thigh, or buttocks one time for 1 dose.  Dispense: 0.5 mL; Refill: 0      Followup: Return in about 6 months (around 2025) for Lab Review.           "

## 2025-08-19 ENCOUNTER — APPOINTMENT (OUTPATIENT)
Dept: URBAN - METROPOLITAN AREA CLINIC 4 | Facility: CLINIC | Age: 55
Setting detail: DERMATOLOGY
End: 2025-08-19

## 2025-08-19 DIAGNOSIS — L81.4 OTHER MELANIN HYPERPIGMENTATION: ICD-10-CM

## 2025-08-19 DIAGNOSIS — D18.0 HEMANGIOMA: ICD-10-CM

## 2025-08-19 DIAGNOSIS — Z71.89 OTHER SPECIFIED COUNSELING: ICD-10-CM

## 2025-08-19 DIAGNOSIS — L82.1 OTHER SEBORRHEIC KERATOSIS: ICD-10-CM

## 2025-08-19 DIAGNOSIS — D22 MELANOCYTIC NEVI: ICD-10-CM

## 2025-08-19 PROBLEM — D22.5 MELANOCYTIC NEVI OF TRUNK: Status: ACTIVE | Noted: 2025-08-19

## 2025-08-19 PROBLEM — D18.01 HEMANGIOMA OF SKIN AND SUBCUTANEOUS TISSUE: Status: ACTIVE | Noted: 2025-08-19

## 2025-08-19 PROBLEM — D22.72 MELANOCYTIC NEVI OF LEFT LOWER LIMB, INCLUDING HIP: Status: ACTIVE | Noted: 2025-08-19

## 2025-08-19 PROBLEM — D22.61 MELANOCYTIC NEVI OF RIGHT UPPER LIMB, INCLUDING SHOULDER: Status: ACTIVE | Noted: 2025-08-19

## 2025-08-19 PROBLEM — D22.62 MELANOCYTIC NEVI OF LEFT UPPER LIMB, INCLUDING SHOULDER: Status: ACTIVE | Noted: 2025-08-19

## 2025-08-19 PROBLEM — D22.39 MELANOCYTIC NEVI OF OTHER PARTS OF FACE: Status: ACTIVE | Noted: 2025-08-19

## 2025-08-19 PROBLEM — D22.71 MELANOCYTIC NEVI OF RIGHT LOWER LIMB, INCLUDING HIP: Status: ACTIVE | Noted: 2025-08-19

## 2025-08-19 PROCEDURE — ? COUNSELING

## 2025-08-19 PROCEDURE — ? SUNSCREEN RECOMMENDATIONS

## 2025-08-19 ASSESSMENT — LOCATION SIMPLE DESCRIPTION DERM
LOCATION SIMPLE: LEFT POSTERIOR THIGH
LOCATION SIMPLE: LEFT POSTERIOR UPPER ARM
LOCATION SIMPLE: RIGHT PRETIBIAL REGION
LOCATION SIMPLE: RIGHT POSTERIOR THIGH
LOCATION SIMPLE: UPPER BACK
LOCATION SIMPLE: RIGHT FOREHEAD
LOCATION SIMPLE: RIGHT POSTERIOR UPPER ARM
LOCATION SIMPLE: RIGHT FOREARM
LOCATION SIMPLE: LEFT PRETIBIAL REGION
LOCATION SIMPLE: RIGHT UPPER BACK
LOCATION SIMPLE: LEFT FOREARM
LOCATION SIMPLE: RIGHT LOWER BACK
LOCATION SIMPLE: RIGHT CHEEK

## 2025-08-19 ASSESSMENT — LOCATION DETAILED DESCRIPTION DERM
LOCATION DETAILED: INFERIOR THORACIC SPINE
LOCATION DETAILED: LEFT VENTRAL PROXIMAL FOREARM
LOCATION DETAILED: LEFT DISTAL POSTERIOR THIGH
LOCATION DETAILED: RIGHT INFERIOR FOREHEAD
LOCATION DETAILED: LEFT PROXIMAL POSTERIOR UPPER ARM
LOCATION DETAILED: RIGHT INFERIOR CENTRAL MALAR CHEEK
LOCATION DETAILED: RIGHT SUPERIOR UPPER BACK
LOCATION DETAILED: LEFT LATERAL PROXIMAL PRETIBIAL REGION
LOCATION DETAILED: RIGHT DISTAL POSTERIOR THIGH
LOCATION DETAILED: RIGHT PROXIMAL PRETIBIAL REGION
LOCATION DETAILED: RIGHT DISTAL POSTERIOR UPPER ARM
LOCATION DETAILED: RIGHT VENTRAL PROXIMAL FOREARM
LOCATION DETAILED: RIGHT INFERIOR MEDIAL UPPER BACK
LOCATION DETAILED: RIGHT INFERIOR MEDIAL MIDBACK

## 2025-08-19 ASSESSMENT — LOCATION ZONE DERM
LOCATION ZONE: LEG
LOCATION ZONE: TRUNK
LOCATION ZONE: ARM
LOCATION ZONE: FACE